# Patient Record
Sex: MALE | Race: WHITE | NOT HISPANIC OR LATINO
[De-identification: names, ages, dates, MRNs, and addresses within clinical notes are randomized per-mention and may not be internally consistent; named-entity substitution may affect disease eponyms.]

---

## 2024-08-28 ENCOUNTER — TRANSCRIPTION ENCOUNTER (OUTPATIENT)
Age: 59
End: 2024-08-28

## 2024-08-28 ENCOUNTER — INPATIENT (INPATIENT)
Facility: HOSPITAL | Age: 59
LOS: 12 days | Discharge: HOME HEALTH | End: 2024-09-10
Attending: ORAL & MAXILLOFACIAL SURGERY | Admitting: ORAL & MAXILLOFACIAL SURGERY
Payer: COMMERCIAL

## 2024-08-28 VITALS
WEIGHT: 100.97 LBS | OXYGEN SATURATION: 98 % | HEIGHT: 64 IN | SYSTOLIC BLOOD PRESSURE: 198 MMHG | DIASTOLIC BLOOD PRESSURE: 78 MMHG | HEART RATE: 62 BPM | RESPIRATION RATE: 17 BRPM

## 2024-08-28 DIAGNOSIS — K04.7 PERIAPICAL ABSCESS WITHOUT SINUS: ICD-10-CM

## 2024-08-28 LAB
ALBUMIN SERPL ELPH-MCNC: 4.2 G/DL — SIGNIFICANT CHANGE UP (ref 3.3–5)
ALP SERPL-CCNC: 95 U/L — SIGNIFICANT CHANGE UP (ref 40–120)
ALT FLD-CCNC: 495 U/L — HIGH (ref 4–41)
ANION GAP SERPL CALC-SCNC: 16 MMOL/L — HIGH (ref 7–14)
APTT BLD: 29.9 SEC — SIGNIFICANT CHANGE UP (ref 24.5–35.6)
AST SERPL-CCNC: 160 U/L — HIGH (ref 4–40)
BASOPHILS # BLD AUTO: 0.09 K/UL — SIGNIFICANT CHANGE UP (ref 0–0.2)
BASOPHILS NFR BLD AUTO: 0.7 % — SIGNIFICANT CHANGE UP (ref 0–2)
BILIRUB SERPL-MCNC: 0.5 MG/DL — SIGNIFICANT CHANGE UP (ref 0.2–1.2)
BUN SERPL-MCNC: 12 MG/DL — SIGNIFICANT CHANGE UP (ref 7–23)
CALCIUM SERPL-MCNC: 10.7 MG/DL — HIGH (ref 8.4–10.5)
CHLORIDE SERPL-SCNC: 103 MMOL/L — SIGNIFICANT CHANGE UP (ref 98–107)
CO2 SERPL-SCNC: 20 MMOL/L — LOW (ref 22–31)
CREAT SERPL-MCNC: 0.94 MG/DL — SIGNIFICANT CHANGE UP (ref 0.5–1.3)
EGFR: 92 ML/MIN/1.73M2 — SIGNIFICANT CHANGE UP
EOSINOPHIL # BLD AUTO: 0.49 K/UL — SIGNIFICANT CHANGE UP (ref 0–0.5)
EOSINOPHIL NFR BLD AUTO: 3.8 % — SIGNIFICANT CHANGE UP (ref 0–6)
GLUCOSE SERPL-MCNC: 105 MG/DL — HIGH (ref 70–99)
HCT VFR BLD CALC: 29.6 % — LOW (ref 39–50)
HGB BLD-MCNC: 9.9 G/DL — LOW (ref 13–17)
IANC: 9.7 K/UL — HIGH (ref 1.8–7.4)
IMM GRANULOCYTES NFR BLD AUTO: 0.4 % — SIGNIFICANT CHANGE UP (ref 0–0.9)
INR BLD: 1.16 RATIO — SIGNIFICANT CHANGE UP (ref 0.85–1.18)
LYMPHOCYTES # BLD AUTO: 1.52 K/UL — SIGNIFICANT CHANGE UP (ref 1–3.3)
LYMPHOCYTES # BLD AUTO: 11.9 % — LOW (ref 13–44)
MCHC RBC-ENTMCNC: 33.4 GM/DL — SIGNIFICANT CHANGE UP (ref 32–36)
MCHC RBC-ENTMCNC: 37.8 PG — HIGH (ref 27–34)
MCV RBC AUTO: 113 FL — HIGH (ref 80–100)
MONOCYTES # BLD AUTO: 0.95 K/UL — HIGH (ref 0–0.9)
MONOCYTES NFR BLD AUTO: 7.4 % — SIGNIFICANT CHANGE UP (ref 2–14)
NEUTROPHILS # BLD AUTO: 9.7 K/UL — HIGH (ref 1.8–7.4)
NEUTROPHILS NFR BLD AUTO: 75.8 % — SIGNIFICANT CHANGE UP (ref 43–77)
NRBC # BLD: 0 /100 WBCS — SIGNIFICANT CHANGE UP (ref 0–0)
NRBC # FLD: 0 K/UL — SIGNIFICANT CHANGE UP (ref 0–0)
PLATELET # BLD AUTO: 426 K/UL — HIGH (ref 150–400)
POTASSIUM SERPL-MCNC: 3.4 MMOL/L — LOW (ref 3.5–5.3)
POTASSIUM SERPL-SCNC: 3.4 MMOL/L — LOW (ref 3.5–5.3)
PROT SERPL-MCNC: 7.6 G/DL — SIGNIFICANT CHANGE UP (ref 6–8.3)
PROTHROM AB SERPL-ACNC: 12.9 SEC — SIGNIFICANT CHANGE UP (ref 9.5–13)
RBC # BLD: 2.62 M/UL — LOW (ref 4.2–5.8)
RBC # FLD: 13.4 % — SIGNIFICANT CHANGE UP (ref 10.3–14.5)
SODIUM SERPL-SCNC: 139 MMOL/L — SIGNIFICANT CHANGE UP (ref 135–145)
TROPONIN T, HIGH SENSITIVITY RESULT: 16 NG/L — SIGNIFICANT CHANGE UP
WBC # BLD: 12.8 K/UL — HIGH (ref 3.8–10.5)
WBC # FLD AUTO: 12.8 K/UL — HIGH (ref 3.8–10.5)

## 2024-08-28 PROCEDURE — 99285 EMERGENCY DEPT VISIT HI MDM: CPT

## 2024-08-28 PROCEDURE — 71045 X-RAY EXAM CHEST 1 VIEW: CPT | Mod: 26

## 2024-08-28 RX ORDER — CITALOPRAM HYDROBROMIDE 40 MG
30 TABLET ORAL
Refills: 0 | Status: DISCONTINUED | OUTPATIENT
Start: 2024-08-28 | End: 2024-09-10

## 2024-08-28 RX ORDER — LAMOTRIGINE 100 MG/1
100 TABLET, EXTENDED RELEASE ORAL AT BEDTIME
Refills: 0 | Status: DISCONTINUED | OUTPATIENT
Start: 2024-08-28 | End: 2024-09-10

## 2024-08-28 RX ORDER — OXYCODONE HYDROCHLORIDE 5 MG/1
10 TABLET ORAL EVERY 4 HOURS
Refills: 0 | Status: DISCONTINUED | OUTPATIENT
Start: 2024-08-28 | End: 2024-08-31

## 2024-08-28 RX ORDER — LITHIUM CARBONATE 150 MG/1
600 CAPSULE ORAL AT BEDTIME
Refills: 0 | Status: DISCONTINUED | OUTPATIENT
Start: 2024-08-28 | End: 2024-09-10

## 2024-08-28 RX ORDER — OXYCODONE HYDROCHLORIDE 5 MG/1
5 TABLET ORAL EVERY 4 HOURS
Refills: 0 | Status: DISCONTINUED | OUTPATIENT
Start: 2024-08-28 | End: 2024-08-31

## 2024-08-28 RX ORDER — ACETAMINOPHEN 325 MG/1
650 TABLET ORAL EVERY 6 HOURS
Refills: 0 | Status: DISCONTINUED | OUTPATIENT
Start: 2024-08-28 | End: 2024-08-29

## 2024-08-28 RX ORDER — AMPICILLIN SODIUM AND SULBACTAM SODIUM 1; .5 G/1; G/1
INJECTION, POWDER, FOR SOLUTION INTRAMUSCULAR; INTRAVENOUS
Refills: 0 | Status: DISCONTINUED | OUTPATIENT
Start: 2024-08-28 | End: 2024-09-01

## 2024-08-28 RX ORDER — AMPICILLIN SODIUM AND SULBACTAM SODIUM 1; .5 G/1; G/1
3 INJECTION, POWDER, FOR SOLUTION INTRAMUSCULAR; INTRAVENOUS ONCE
Refills: 0 | Status: COMPLETED | OUTPATIENT
Start: 2024-08-28 | End: 2024-08-28

## 2024-08-28 RX ORDER — CHLORHEXIDINE GLUCONATE 40 MG/ML
15 SOLUTION TOPICAL
Refills: 0 | Status: DISCONTINUED | OUTPATIENT
Start: 2024-08-28 | End: 2024-08-29

## 2024-08-28 RX ORDER — METOPROLOL TARTRATE 100 MG/1
25 TABLET ORAL
Refills: 0 | Status: DISCONTINUED | OUTPATIENT
Start: 2024-08-28 | End: 2024-08-31

## 2024-08-28 RX ORDER — METHYLPHENIDATE HYDROCHLORIDE 72 MG/1
72 TABLET, EXTENDED RELEASE ORAL EVERY MORNING
Refills: 0 | Status: DISCONTINUED | OUTPATIENT
Start: 2024-08-28 | End: 2024-09-04

## 2024-08-28 RX ORDER — AMPICILLIN SODIUM AND SULBACTAM SODIUM 1; .5 G/1; G/1
3 INJECTION, POWDER, FOR SOLUTION INTRAMUSCULAR; INTRAVENOUS EVERY 6 HOURS
Refills: 0 | Status: DISCONTINUED | OUTPATIENT
Start: 2024-08-28 | End: 2024-09-01

## 2024-08-28 RX ADMIN — AMPICILLIN SODIUM AND SULBACTAM SODIUM 200 GRAM(S): 1; .5 INJECTION, POWDER, FOR SOLUTION INTRAMUSCULAR; INTRAVENOUS at 18:15

## 2024-08-28 RX ADMIN — CHLORHEXIDINE GLUCONATE 15 MILLILITER(S): 40 SOLUTION TOPICAL at 19:04

## 2024-08-28 RX ADMIN — AMPICILLIN SODIUM AND SULBACTAM SODIUM 200 GRAM(S): 1; .5 INJECTION, POWDER, FOR SOLUTION INTRAMUSCULAR; INTRAVENOUS at 23:11

## 2024-08-28 RX ADMIN — Medication 100 MILLIGRAM(S): at 23:02

## 2024-08-28 RX ADMIN — Medication 85 MILLILITER(S): at 19:04

## 2024-08-28 RX ADMIN — ACETAMINOPHEN 650 MILLIGRAM(S): 325 TABLET ORAL at 18:30

## 2024-08-28 RX ADMIN — AMPICILLIN SODIUM AND SULBACTAM SODIUM 200 GRAM(S): 1; .5 INJECTION, POWDER, FOR SOLUTION INTRAMUSCULAR; INTRAVENOUS at 14:38

## 2024-08-28 RX ADMIN — ACETAMINOPHEN 650 MILLIGRAM(S): 325 TABLET ORAL at 17:32

## 2024-08-28 RX ADMIN — LAMOTRIGINE 100 MILLIGRAM(S): 100 TABLET, EXTENDED RELEASE ORAL at 23:02

## 2024-08-28 RX ADMIN — LITHIUM CARBONATE 600 MILLIGRAM(S): 150 CAPSULE ORAL at 23:02

## 2024-08-28 NOTE — ED ADULT NURSE NOTE - CHIEF COMPLAINT QUOTE
pt from [facility] with abscess under the tongue. had drain placed last week. c/o swelling to tongue. pt able to tolerate own secretions, no stridor/wheezing noted.  arrives with #20 s/l to L forearm. pt refusing temp orally.

## 2024-08-28 NOTE — ED ADULT TRIAGE NOTE - NS ED NURSE BANDS TYPE
H&P is consistent. No new changes.     Plan to go to OR today for LEFT URS with LL>     I reviewed risks and benefits of surgery. Patient understands known risks such as bleeding, infection, and damage to tissue or surrounding structures. They are still willing to proceed.       Gary Ghosh MD, PhD   Department of Urology   [facility]     Allergy;

## 2024-08-28 NOTE — ED ADULT NURSE NOTE - OBJECTIVE STATEMENT
pt transfer from other hospital pt noted to have penrose drain under neath tongue pt airway clear zero obvious SOB md méndez continues

## 2024-08-28 NOTE — ED ADULT TRIAGE NOTE - CHIEF COMPLAINT QUOTE
pt from [facility] with abscess under the tongue. had drain placed last week. c/o swelling to tongue. pt able to tolerate own secretions, no stridor/wheezing noted.  arrives with #20 s/l to L forearm. pt refusing temp orally.

## 2024-08-28 NOTE — PATIENT PROFILE ADULT - FALL HARM RISK - HARM RISK INTERVENTIONS
Assistance with ambulation/Communicate Risk of Fall with Harm to all staff/Reinforce activity limits and safety measures with patient and family/Reorient to person, place and time as needed/Review medications for side effects contributing to fall risk/Sit up slowly, dangle for a short time, stand at bedside before walking/Tailored Fall Risk Interventions/Toileting schedule using arm’s reach rule for commode and bathroom/Use of alarms - bed, chair and/or voice tab/Visual Cue: Yellow wristband and red socks/Bed in lowest position, wheels locked, appropriate side rails in place/Call bell, personal items and telephone in reach/Instruct patient to call for assistance before getting out of bed or chair/Non-slip footwear when patient is out of bed/[place] to call system/Physically safe environment - no spills, clutter or unnecessary equipment/Purposeful Proactive Rounding/Room/bathroom lighting operational, light cord in reach

## 2024-08-28 NOTE — PATIENT PROFILE ADULT - FUNCTIONAL ASSESSMENT - BASIC MOBILITY 4.
Dr. Katy Reeder called patient this am and advised him to return to the ED for further evaluation. 4 = No assist / stand by assistance

## 2024-08-28 NOTE — ED PROVIDER NOTE - PROGRESS NOTE DETAILS
Fellow MD Declan Escobar: [facility] paged. Fellow MD Declan Escobar: OMFS at patient bedside.

## 2024-08-28 NOTE — ED PROVIDER NOTE - ATTENDING CONTRIBUTION TO CARE
Keshia SAMAYOA: Patient is a 59-year-old male with a history of ADHD, questionable cognitive dysfunction transferred from [facility] for OMFS evaluation.  Patient was transferred from [facility] on 8/25 for an abscess on the floor of his mouth after having implant surgery.  During that evaluation, patient was subsequently placed in the CDU and OMFS performed an I&D and placed a drain in his mouth.  He was given Unasyn and discharged on Augmentin.  Patient states he went back to [facility] yesterday for severe pain.  He is transferred today from [facility] for OMFS reevaluation.  Per documentation sent with patient, patient was called back for positive blood culture.   per paperwork, patient has been taking his amoxicillin.  He was given vancomycin and Zosyn.  Patient did have a CT scan of soft tissue done.  CT showed 2 dental implants at the level of the mandibular central incisors.  Periapical lucency surrounding the right sided dental implant.  Surgical drain adjacent to the inner aspect of the mandible at the level of the dental implant on the right.  Multilocular rim-enhancing collection (abscess) in the right sublingual space which is inseparable from the, and extends to the level of the right lingual tonsil measuring at least up to 6.0 x 2.6 x 2.6 cm. Patient denies any fevers or chills.  He denies any shortness of breath.  He states that he developed chest pain last night but did not tell chronic may about this.  Patient denies radiation of this chest pain.  He states it could be anxiety.  He denies any nausea or vomiting.        VS noted  Gen. no acute distress, Non toxic   HEENT: EOMI, mmm, Poor dentition, drain in floor of mouth, airway intact.,  no stridor, airway intact  Lungs: CTAB/L no C/ W /R   CVS: RRR    chest wall: TTP to left chest wall  Abd; Soft non tender, non distended   Ext: no edema  Skin: no rash  Neuro AAOx3 non focal clear speech  a/p:  right sublingual space abscess–patient has a drain that was placed by OMFS in place.  He denies any fevers or chills.  He was treated with vancomycin and Zosyn prior to transfer.  Patient already had CT imaging, visible in PACS.  As patient is complaining of chest pain, will repeat including troponin and check EKG.  Patient was called back for a positive blood culture.  He will need to be admitted.  - Kehsia SAMAYOA

## 2024-08-28 NOTE — ED PROVIDER NOTE - CLINICAL SUMMARY MEDICAL DECISION MAKING FREE TEXT BOX
Fellow MD Declan Escobar: 59-year-old male with past medical history of HTN, HLD, with recent tooth implant complicated by abscess status post I&D 2 days ago by OMFS team here and presenting as a transfer from [facility] for OMFS evaluation.  As per patient, he states that he has been experiencing worsening pain to the sublingual space with trismus.  Patient also reports left-sided chest wall pain since last night described as a poking sensation.  The pain is not severe he denies any recent trauma.  Pain is nonexertional.  Troponin from 2045 at [facility] last night was 17.  Patient denies any fever, chills, difficulty breathing, difficulty swallowing, nausea, vomiting, diarrhea, abdominal pain, dysuria, hematuria, and any additional complaints at this time.  No recent travel or sick contacts.    On arrival to the ED, the patient's airway is patent.  He is hypertensive, nontachycardic, afebrile, and satting 98% on room air.  Patient has a sublingual drain in place with no active drainage.  There is tenderness to palpation and trismus noted.  CT report from [facility] shows "2 dental implants at the level of the mandibular central incisors.  Periapical lucency surrounding the right sided dental implant.  Surgical drain adjacent to the inner aspect of the mandible at the level of the dental implant on the right.  Multilocular rim-enhancing collection (abscess) in the right sublingual space which is inseparable from the tongue and extends to the level of the right lingual tonsil measuring at least up to 6 x 2.6 x 2.6 cm.  OMFS consulted in the ED who came to the bedside.  As per transfer note, patient was also found to have a positive blood culture and was treated with 1 g vancomycin and 4.5 g of Zosyn prior to transfer.

## 2024-08-28 NOTE — ED PROVIDER NOTE - OBJECTIVE STATEMENT
Keshia SAMAYOA: Patient is a 59-year-old male with a history of ADHD, questionable cognitive dysfunction transferred from [facility] for OMFS evaluation.  Patient was transferred from [facility] on 8/25 for an abscess on the floor of his mouth after having implant surgery.  During that evaluation, patient was subsequently placed in the CDU and OMFS performed an I&D and placed a drain in his mouth.  He was given Unasyn and discharged on Augmentin.  Patient states he went back to [facility] yesterday for severe pain.  He is transferred today from [facility] for OMFS reevaluation.  Patient denies any fevers or chills.  He denies any shortness of breath.  He states that he developed chest pain last night but did not tell chronic may about this.  Patient denies radiation of this chest pain.  He states it could be anxiety.  He denies any nausea or vomiting. Keshia SAMAYOA: Patient is a 59-year-old male with a history of ADHD, questionable cognitive dysfunction transferred from [facility] for OMFS evaluation.  Patient was transferred from [facility] on 8/25 for an abscess on the floor of his mouth after having implant surgery.  During that evaluation, patient was subsequently placed in the CDU and OMFS performed an I&D and placed a drain in his mouth.  He was given Unasyn and discharged on Augmentin.  Patient states he went back to [facility] yesterday for severe pain.  He is transferred today from [facility] for OMFS reevaluation.  Per documentation sent with patient, patient was called back for positive blood culture.   per paperwork, patient has been taking his amoxicillin.  He was given vancomycin and Zosyn.  Patient did have a CT scan of soft tissue done.  CT showed 2 dental implants at the level of the mandibular central incisors.  Periapical lucency surrounding the right sided dental implant.  Surgical drain adjacent to the inner aspect of the mandible at the level of the dental implant on the right.  Multilocular rim-enhancing collection (abscess) in the right sublingual space which is inseparable from the, and extends to the level of the right lingual tonsil measuring at least up to 6.0 x 2.6 x 2.6 cm. Patient denies any fevers or chills.  He denies any shortness of breath.  He states that he developed chest pain last night but did not tell chronic may about this.  Patient denies radiation of this chest pain.  He states it could be anxiety.  He denies any nausea or vomiting.

## 2024-08-28 NOTE — ED ADULT NURSE REASSESSMENT NOTE - NS ED NURSE REASSESS COMMENT FT1
report given to shila carrington.     patient aaox4. ambulatory. patient is well appearing. penrose drain in place in mouth. denies pain or discomfort. respirations even and unlabored on room air. tolerating po full liquids at bedside. lr at 85ml/hr. iv site intact. awaiting transport.

## 2024-08-28 NOTE — ED ADULT NURSE REASSESSMENT NOTE - NS ED NURSE REASSESS COMMENT FT1
PT is resting in stretcher, easily arousable to verbal stimuli. no apparent distress noted.  pt brought over to CDU. pt complains of right knuckle redness with out pain. RN Ab and MD Ewing made aware. pt plan of care on going.

## 2024-08-28 NOTE — H&P ADULT - ASSESSMENT
59M with HTN and unspecified cognitive disorder, with worsening sublingual abscess, for I&D in OR tomorrow .    PLAN:   - Admit under care of [facility] Dr. Sepulveda   - IV Unasyn   - multimodal pain management   - NPO midnight   - Labs: (pt,ptt,type&screen)  - Diet: Full liquid diet  59M with HTN, HLD, unspecified cognitive disorder, with worsening sublingual abscess, for I&D in OR tomorrow .    PLAN:   - Admit under care of [facility] Dr. Derick Best   - multimodal pain management   - NPO midnight   - Labs: (pt,ptt,type&screen)  - Diet: Full liquid diet

## 2024-08-28 NOTE — ED PROVIDER NOTE - ENMT, MLM
Airway patent, Nasal mucosa clear. Mouth with normal mucosa. Throat has no vesicles, no oropharyngeal exudates and uvula is midline. Poor dentition, drain in floor of mouth, airway intact.

## 2024-08-28 NOTE — H&P ADULT - NSHPREVIEWOFSYSTEMS_GEN_ALL_CORE
CONSTITUTIONAL: Well groomed, no apparent distress    EYES: PERRL and symmetric, EOMI, No conjunctival or scleral injection, non-icteric    ENMT:   ears: canals clear, no signs of hearing discrepancies  nose: nares clear, no rhinorrhea noted  intact inferior boarder of mandible     IOE: FOZIA WNL, uvula centered, FOM elevated, indurated sublingual swelling, no purulence discharge from penrose, intact tongue movements.     NECK: Supple, symmetric and without tracheal deviation     RESP: No respiratory distress, no use of accessory muscles; CTA b/l    CV: RRR, no JVD; no peripheral edema    GI: Soft, NT, ND, no rebound, no guarding; no palpable masses; no hepatosplenomegaly    LYMPH: No cervical LAD or tenderness; no axillary LAD or tenderness    MSK: Normal gait; No digital clubbing or cyanosis; examination of the head/neck without misalignment,            Normal ROM without pain, no spinal tenderness, normal muscle strength/tone    SKIN: No rashes or ulcers noted; no subcutaneous nodules or induration palpable    NEURO: CN II-XII intact; normal reflexes in upper, sensation intact in upper and lower extremities b/l to light touch     PSYCH: Appropriate insight/judgment; A+O x 3, mood and affect appropriate, recent/remote memory slightly altered

## 2024-08-28 NOTE — H&P ADULT - NSHPPHYSICALEXAM_GEN_ALL_CORE
General: AAOx3, NAD  H: Normocephalic             Maxillofacial: No step deformities notes, no facial asymmetry, intact inferior boarder of mandible              Intraoral:  FOZIA WNL, uvula centered, FOM elevated, indurated sublingual swelling, no purulence discharge from penrose, intact tongue movements.   E: PERRLA, EOMI  E: Hearing grossly intact, no otorrhea  N: No septal hematoma, no crepitus, no epistaxis  T: Trachea midline  Neuro: No CN V1-3 or VII neuro deficits.

## 2024-08-28 NOTE — H&P ADULT - HISTORY OF PRESENT ILLNESS
59M with HTN and unspecified cognitive disorder. Transferred from [facility] with worsening sublingual swelling.   Pt seen at [facility] on Aug 26h for the same complaint, bedside I&D of  sublingual space done with IV Unasyn. Pt discharged the following day with PO Augmentin "pt denies taking his PO abx after discharged". Today pt reports that he didn't eat nor drink much because of the pain and difficulty swallowing. pt reports improving in his symptoms after the IV abx received at [facility]. reports mild chest pain. Denies SOB/F/N/C/V,  59M with HTN, HLD, unspecified cognitive disorder. Transferred from [facility] with worsening sublingual swelling.   Pt seen at [facility] on Aug 26h for the same complaint, bedside I&D of  sublingual space done with IV Unasyn. Pt discharged the following day with PO Augmentin "pt denies taking his PO abx after discharged". Today pt reports that he didn't eat nor drink much because of the pain and difficulty swallowing. pt reports improving in his symptoms after the IV abx received at [facility]. reports mild chest pain. Denies SOB/F/N/C/V,

## 2024-08-28 NOTE — H&P ADULT - NSHPLABSRESULTS_GEN_ALL_CORE
CT Maxillofacial (Aug 28th, 2024)  Technique: Axial, sagittal, and coronal noncontrast CT scans of the facial  bones.    Findings:  The examination shows the frontal sinuses to be well developed and normally  aerated. The anterior and posterior sinus tables are intact. The ethmoid  sinuses are well developed. There is mild mucosal thickening and there is no  bone abnormality. The lamina papyracea are intact. The sphenoid sinuses are  well developed and normal.    The maxillary sinuses are symmetrically developed. There is circumferential  mucosal thickening of the left maxillary sinus. There is partial  opacification of the left mastoid tip.    No nasal fossa abnormality is present. No fracture of the facial bones is  seen. Is deviation of the nasal septum with convexity to the right.    The visualized intracranial and orbital soft tissues are grossly normal as  are the visualized soft tissues under the skull base.    IMPRESSION: No facial bone fracture is seen.  ---------------------------------------  Labs:                      9.9    12.80 )-----------( [id number]      ( 28 Aug 2024 10:40 )             29.6   08-28    139  |  103  |  12  ----------------------------<  105<H>  3.4<L>   |  20<L>  |  0.94    Ca    10.7<H>      28 Aug 2024 10:40    TPro  7.6  /  Alb  4.2  /  TBili  0.5  /  DBili  x   /  AST  160<H>  /  ALT  495<H>  /  AlkPhos  95  08-28

## 2024-08-29 ENCOUNTER — APPOINTMENT (OUTPATIENT)
Age: 59
End: 2024-08-29
Payer: COMMERCIAL

## 2024-08-29 LAB
ALBUMIN SERPL ELPH-MCNC: 3.5 G/DL — SIGNIFICANT CHANGE UP (ref 3.3–5)
ALP SERPL-CCNC: 70 U/L — SIGNIFICANT CHANGE UP (ref 40–120)
ALT FLD-CCNC: 322 U/L — HIGH (ref 4–41)
ANION GAP SERPL CALC-SCNC: 13 MMOL/L — SIGNIFICANT CHANGE UP (ref 7–14)
ANION GAP SERPL CALC-SCNC: 13 MMOL/L — SIGNIFICANT CHANGE UP (ref 7–14)
AST SERPL-CCNC: 81 U/L — HIGH (ref 4–40)
BILIRUB SERPL-MCNC: 0.4 MG/DL — SIGNIFICANT CHANGE UP (ref 0.2–1.2)
BLOOD GAS ARTERIAL COMPREHENSIVE RESULT: SIGNIFICANT CHANGE UP
BUN SERPL-MCNC: 8 MG/DL — SIGNIFICANT CHANGE UP (ref 7–23)
BUN SERPL-MCNC: 9 MG/DL — SIGNIFICANT CHANGE UP (ref 7–23)
CALCIUM SERPL-MCNC: 9.7 MG/DL — SIGNIFICANT CHANGE UP (ref 8.4–10.5)
CALCIUM SERPL-MCNC: 9.8 MG/DL — SIGNIFICANT CHANGE UP (ref 8.4–10.5)
CHLORIDE SERPL-SCNC: 105 MMOL/L — SIGNIFICANT CHANGE UP (ref 98–107)
CHLORIDE SERPL-SCNC: 105 MMOL/L — SIGNIFICANT CHANGE UP (ref 98–107)
CO2 SERPL-SCNC: 24 MMOL/L — SIGNIFICANT CHANGE UP (ref 22–31)
CO2 SERPL-SCNC: 24 MMOL/L — SIGNIFICANT CHANGE UP (ref 22–31)
CREAT SERPL-MCNC: 0.71 MG/DL — SIGNIFICANT CHANGE UP (ref 0.5–1.3)
CREAT SERPL-MCNC: 0.9 MG/DL — SIGNIFICANT CHANGE UP (ref 0.5–1.3)
EGFR: 104 ML/MIN/1.73M2 — SIGNIFICANT CHANGE UP
EGFR: 97 ML/MIN/1.73M2 — SIGNIFICANT CHANGE UP
GLUCOSE SERPL-MCNC: 200 MG/DL — HIGH (ref 70–99)
GLUCOSE SERPL-MCNC: 94 MG/DL — SIGNIFICANT CHANGE UP (ref 70–99)
HCT VFR BLD CALC: 25.8 % — LOW (ref 39–50)
HCT VFR BLD CALC: 28.6 % — LOW (ref 39–50)
HGB BLD-MCNC: 8.7 G/DL — LOW (ref 13–17)
HGB BLD-MCNC: 9.8 G/DL — LOW (ref 13–17)
INR BLD: 1.15 RATIO — SIGNIFICANT CHANGE UP (ref 0.85–1.18)
MAGNESIUM SERPL-MCNC: 1.8 MG/DL — SIGNIFICANT CHANGE UP (ref 1.6–2.6)
MAGNESIUM SERPL-MCNC: 1.9 MG/DL — SIGNIFICANT CHANGE UP (ref 1.6–2.6)
MCHC RBC-ENTMCNC: 33.7 GM/DL — SIGNIFICANT CHANGE UP (ref 32–36)
MCHC RBC-ENTMCNC: 34.3 GM/DL — SIGNIFICANT CHANGE UP (ref 32–36)
MCHC RBC-ENTMCNC: 37.7 PG — HIGH (ref 27–34)
MCHC RBC-ENTMCNC: 38.1 PG — HIGH (ref 27–34)
MCV RBC AUTO: 111.3 FL — HIGH (ref 80–100)
MCV RBC AUTO: 111.7 FL — HIGH (ref 80–100)
NRBC # BLD: 0 /100 WBCS — SIGNIFICANT CHANGE UP (ref 0–0)
NRBC # BLD: 0 /100 WBCS — SIGNIFICANT CHANGE UP (ref 0–0)
NRBC # FLD: 0 K/UL — SIGNIFICANT CHANGE UP (ref 0–0)
NRBC # FLD: 0 K/UL — SIGNIFICANT CHANGE UP (ref 0–0)
PHOSPHATE SERPL-MCNC: 2.5 MG/DL — SIGNIFICANT CHANGE UP (ref 2.5–4.5)
PHOSPHATE SERPL-MCNC: 3.2 MG/DL — SIGNIFICANT CHANGE UP (ref 2.5–4.5)
PLATELET # BLD AUTO: 400 K/UL — SIGNIFICANT CHANGE UP (ref 150–400)
PLATELET # BLD AUTO: 419 K/UL — HIGH (ref 150–400)
POTASSIUM SERPL-MCNC: 3.5 MMOL/L — SIGNIFICANT CHANGE UP (ref 3.5–5.3)
POTASSIUM SERPL-MCNC: 3.5 MMOL/L — SIGNIFICANT CHANGE UP (ref 3.5–5.3)
POTASSIUM SERPL-SCNC: 3.5 MMOL/L — SIGNIFICANT CHANGE UP (ref 3.5–5.3)
POTASSIUM SERPL-SCNC: 3.5 MMOL/L — SIGNIFICANT CHANGE UP (ref 3.5–5.3)
PROT SERPL-MCNC: 6.1 G/DL — SIGNIFICANT CHANGE UP (ref 6–8.3)
PROTHROM AB SERPL-ACNC: 12.9 SEC — SIGNIFICANT CHANGE UP (ref 9.5–13)
RBC # BLD: 2.31 M/UL — LOW (ref 4.2–5.8)
RBC # BLD: 2.57 M/UL — LOW (ref 4.2–5.8)
RBC # FLD: 12.4 % — SIGNIFICANT CHANGE UP (ref 10.3–14.5)
RBC # FLD: 13 % — SIGNIFICANT CHANGE UP (ref 10.3–14.5)
SODIUM SERPL-SCNC: 142 MMOL/L — SIGNIFICANT CHANGE UP (ref 135–145)
SODIUM SERPL-SCNC: 142 MMOL/L — SIGNIFICANT CHANGE UP (ref 135–145)
WBC # BLD: 10.56 K/UL — HIGH (ref 3.8–10.5)
WBC # BLD: 15.55 K/UL — HIGH (ref 3.8–10.5)
WBC # FLD AUTO: 10.56 K/UL — HIGH (ref 3.8–10.5)
WBC # FLD AUTO: 15.55 K/UL — HIGH (ref 3.8–10.5)

## 2024-08-29 PROCEDURE — 71045 X-RAY EXAM CHEST 1 VIEW: CPT | Mod: 26

## 2024-08-29 PROCEDURE — 71045 X-RAY EXAM CHEST 1 VIEW: CPT | Mod: 26,77

## 2024-08-29 PROCEDURE — 99291 CRITICAL CARE FIRST HOUR: CPT | Mod: GC

## 2024-08-29 DEVICE — AVITENE: Type: IMPLANTABLE DEVICE | Status: FUNCTIONAL

## 2024-08-29 RX ORDER — DEXMEDETOMIDINE HYDROCHLORIDE IN 0.9% SODIUM CHLORIDE 4 UG/ML
0.2 INJECTION INTRAVENOUS
Qty: 200 | Refills: 0 | Status: DISCONTINUED | OUTPATIENT
Start: 2024-08-29 | End: 2024-08-29

## 2024-08-29 RX ORDER — DEXTROSE, SODIUM ACETATE, POTASSIUM CHLORIDE, POTASSIUM PHOSPHATE, AND SODIUM CHLORIDE 5; .15; .13; .28; .091 G/100ML; G/100ML; G/100ML; G/100ML; G/100ML
1000 INJECTION, SOLUTION INTRAVENOUS
Refills: 0 | Status: DISCONTINUED | OUTPATIENT
Start: 2024-08-29 | End: 2024-09-01

## 2024-08-29 RX ORDER — CHLORHEXIDINE GLUCONATE 40 MG/ML
15 SOLUTION TOPICAL EVERY 12 HOURS
Refills: 0 | Status: DISCONTINUED | OUTPATIENT
Start: 2024-08-29 | End: 2024-09-03

## 2024-08-29 RX ORDER — PROPOFOL 10 MG/ML
40 INJECTION, EMULSION INTRAVENOUS
Qty: 1000 | Refills: 0 | Status: DISCONTINUED | OUTPATIENT
Start: 2024-08-29 | End: 2024-08-30

## 2024-08-29 RX ORDER — POTASSIUM CHLORIDE 10 MEQ
10 TABLET, EXT RELEASE, PARTICLES/CRYSTALS ORAL
Refills: 0 | Status: COMPLETED | OUTPATIENT
Start: 2024-08-29 | End: 2024-08-29

## 2024-08-29 RX ORDER — DEXAMETHASONE 0.75 MG
4 TABLET ORAL EVERY 8 HOURS
Refills: 0 | Status: DISCONTINUED | OUTPATIENT
Start: 2024-08-29 | End: 2024-08-29

## 2024-08-29 RX ORDER — DEXMEDETOMIDINE HYDROCHLORIDE IN 0.9% SODIUM CHLORIDE 4 UG/ML
0.2 INJECTION INTRAVENOUS
Qty: 400 | Refills: 0 | Status: DISCONTINUED | OUTPATIENT
Start: 2024-08-29 | End: 2024-08-30

## 2024-08-29 RX ORDER — HYDROMORPHONE HYDROCHLORIDE 2 MG/1
1 TABLET ORAL
Refills: 0 | Status: DISCONTINUED | OUTPATIENT
Start: 2024-08-29 | End: 2024-08-29

## 2024-08-29 RX ORDER — HYDROMORPHONE HYDROCHLORIDE 2 MG/1
0.5 TABLET ORAL
Refills: 0 | Status: DISCONTINUED | OUTPATIENT
Start: 2024-08-29 | End: 2024-08-29

## 2024-08-29 RX ORDER — ENOXAPARIN SODIUM 100 MG/ML
40 INJECTION SUBCUTANEOUS EVERY 24 HOURS
Refills: 0 | Status: DISCONTINUED | OUTPATIENT
Start: 2024-08-29 | End: 2024-09-05

## 2024-08-29 RX ORDER — CHLORHEXIDINE GLUCONATE 40 MG/ML
1 SOLUTION TOPICAL DAILY
Refills: 0 | Status: DISCONTINUED | OUTPATIENT
Start: 2024-08-29 | End: 2024-09-10

## 2024-08-29 RX ORDER — ACETAMINOPHEN 325 MG/1
1000 TABLET ORAL EVERY 6 HOURS
Refills: 0 | Status: COMPLETED | OUTPATIENT
Start: 2024-08-29 | End: 2024-08-30

## 2024-08-29 RX ORDER — HYDRALAZINE HCL 50 MG
10 TABLET ORAL ONCE
Refills: 0 | Status: COMPLETED | OUTPATIENT
Start: 2024-08-29 | End: 2024-08-29

## 2024-08-29 RX ADMIN — DEXMEDETOMIDINE HYDROCHLORIDE IN 0.9% SODIUM CHLORIDE 2.29 MICROGRAM(S)/KG/HR: 4 INJECTION INTRAVENOUS at 23:09

## 2024-08-29 RX ADMIN — ACETAMINOPHEN 400 MILLIGRAM(S): 325 TABLET ORAL at 23:16

## 2024-08-29 RX ADMIN — CHLORHEXIDINE GLUCONATE 15 MILLILITER(S): 40 SOLUTION TOPICAL at 17:00

## 2024-08-29 RX ADMIN — AMPICILLIN SODIUM AND SULBACTAM SODIUM 200 GRAM(S): 1; .5 INJECTION, POWDER, FOR SOLUTION INTRAMUSCULAR; INTRAVENOUS at 11:49

## 2024-08-29 RX ADMIN — Medication 10 MILLIGRAM(S): at 11:44

## 2024-08-29 RX ADMIN — DEXMEDETOMIDINE HYDROCHLORIDE IN 0.9% SODIUM CHLORIDE 2.29 MICROGRAM(S)/KG/HR: 4 INJECTION INTRAVENOUS at 14:29

## 2024-08-29 RX ADMIN — CHLORHEXIDINE GLUCONATE 15 MILLILITER(S): 40 SOLUTION TOPICAL at 05:37

## 2024-08-29 RX ADMIN — Medication 100 MILLIEQUIVALENT(S): at 22:48

## 2024-08-29 RX ADMIN — AMPICILLIN SODIUM AND SULBACTAM SODIUM 200 GRAM(S): 1; .5 INJECTION, POWDER, FOR SOLUTION INTRAMUSCULAR; INTRAVENOUS at 05:37

## 2024-08-29 RX ADMIN — DEXMEDETOMIDINE HYDROCHLORIDE IN 0.9% SODIUM CHLORIDE 2.29 MICROGRAM(S)/KG/HR: 4 INJECTION INTRAVENOUS at 19:48

## 2024-08-29 RX ADMIN — ENOXAPARIN SODIUM 40 MILLIGRAM(S): 100 INJECTION SUBCUTANEOUS at 17:00

## 2024-08-29 RX ADMIN — PROPOFOL 11 MICROGRAM(S)/KG/MIN: 10 INJECTION, EMULSION INTRAVENOUS at 14:28

## 2024-08-29 RX ADMIN — Medication 85 MILLILITER(S): at 05:37

## 2024-08-29 RX ADMIN — Medication 100 GRAM(S): at 20:49

## 2024-08-29 RX ADMIN — PROPOFOL 11 MICROGRAM(S)/KG/MIN: 10 INJECTION, EMULSION INTRAVENOUS at 19:48

## 2024-08-29 RX ADMIN — DEXTROSE, SODIUM ACETATE, POTASSIUM CHLORIDE, POTASSIUM PHOSPHATE, AND SODIUM CHLORIDE 100 MILLILITER(S): 5; .15; .13; .28; .091 INJECTION, SOLUTION INTRAVENOUS at 19:47

## 2024-08-29 RX ADMIN — PROPOFOL 11 MICROGRAM(S)/KG/MIN: 10 INJECTION, EMULSION INTRAVENOUS at 10:20

## 2024-08-29 RX ADMIN — Medication 100 MILLIEQUIVALENT(S): at 20:49

## 2024-08-29 RX ADMIN — ACETAMINOPHEN 400 MILLIGRAM(S): 325 TABLET ORAL at 17:00

## 2024-08-29 RX ADMIN — DEXTROSE, SODIUM ACETATE, POTASSIUM CHLORIDE, POTASSIUM PHOSPHATE, AND SODIUM CHLORIDE 100 MILLILITER(S): 5; .15; .13; .28; .091 INJECTION, SOLUTION INTRAVENOUS at 14:29

## 2024-08-29 RX ADMIN — CHLORHEXIDINE GLUCONATE 1 APPLICATION(S): 40 SOLUTION TOPICAL at 11:49

## 2024-08-29 RX ADMIN — AMPICILLIN SODIUM AND SULBACTAM SODIUM 200 GRAM(S): 1; .5 INJECTION, POWDER, FOR SOLUTION INTRAMUSCULAR; INTRAVENOUS at 23:10

## 2024-08-29 RX ADMIN — PROPOFOL 11 MICROGRAM(S)/KG/MIN: 10 INJECTION, EMULSION INTRAVENOUS at 23:09

## 2024-08-29 RX ADMIN — AMPICILLIN SODIUM AND SULBACTAM SODIUM 200 GRAM(S): 1; .5 INJECTION, POWDER, FOR SOLUTION INTRAMUSCULAR; INTRAVENOUS at 17:00

## 2024-08-29 NOTE — CONSULT NOTE ADULT - ATTENDING COMMENTS
Patient s/p I and d of sublingual abscess requiring intubating for critical airway and airway edema. Will keep intubated 24 hours and attempt extubation tomorrow  N mentating, sedated on propofol, dilaudid prn for pain  resp on mechanical ventilation adequate gas exchange, intubated for airway edema  cv adequate perfusion  gi npo, ivf  gu/renal adequate uop, monitor  heme vte ppx  id unasyn  endo no changes    The patient is a critical care patient with life threatening hemodynamic and metabolic instability in SICU.  I have personally interviewed when possible and examined the patient, reviewed data and laboratory tests/x-rays and all pertinent electronic images.  I was physically present for the key portions of the evaluation and management (E/M) service provided.   The SICU team has a constant risk benefit analyzes discussion with the primary team, all consultants, House Staff and PA's on all decisions.  These diagnoses are unrelated to the surgical procedure noted above.  I meet with family if needed to get further history, discuss the case and make care decisions for this patient who might not be able to participate.  Time involved in performance of separately billable procedures was not counted toward my critical care time. There is no overlap.  I spent 55-75 minutes ( 0800Hrs-0915Hrs in AM/ 1600Hrs-1715Hrs in PM, or other time indicated) of critical care time for the diagnoses, assessment, plan and interventions.  This time excludes time spent on separate procedures and teaching.

## 2024-08-29 NOTE — PROGRESS NOTE ADULT - SUBJECTIVE AND OBJECTIVE BOX
59M HD2 HTN, HLD with worsening sublingual swelling. Patient is going today for I&D of sublingual space abscess. Patient has been NPO. Denies SOB/F/N/C/V,       Constitutional: Well nourished, no acute distress.  Neuro: AAOx3, CN II-XII grossly intact  Head: Normocephalic, atraumatic  Eyes: EOMI, PERRL, Direct and consensual pupillary reflex  Ears: hearing grossly intact b/l  Nose: nares patent  Neck: trachea midline, no cervical LAD  Respiratory: symmetry of respiratory movements, adequate depth and quality, no acute  respiratory distress    Extra Oral Exam: FOZIA ~30mm. no trismus, no LAD, inferior border of the mandible fully  palpable.  Intra Oral Exam: uvula midline, no vestibular pathology, no grossly carious teeth, no s/s of  infection, no fernando purulence, drain in place FOM s/e/nt.    CT max face shows collection in the sublingual area

## 2024-08-29 NOTE — PROGRESS NOTE ADULT - ASSESSMENT
59M HD2 HTN, HLD with worsening sublingual swelling.    - Patient going to OR today  - NPO 59M HD2 HTN, HLD with worsening sublingual swelling.    - Patient going to OR today  - NPO    Mason De La Cruz  Oral and Maxillofacial Surgery  [facility] Pager #[phone]  [facility] Pager: [phone]  Available on Teams

## 2024-08-29 NOTE — BRIEF OPERATIVE NOTE - OPERATION/FINDINGS
Incision and drainage of sublingual space abscess. Concern for posterior airway swelling with possible hematoma. Patient left intubated for airway protection.

## 2024-08-29 NOTE — CONSULT NOTE ADULT - ASSESSMENT
59M with HTN, HLD, unspecified cognitive disorder. SP I&D 8/29 2/2 worsening sublingual abscess    Neuro  - Sedated  - Pain control w/ IV tylenol, Dilaudid PRN    Resp  - Intubated 2/2 sublingual edema v hematoma. To stay intubated 24hr    Card  - MAP >65    GI  - NPO      - monitor urine output  - IVF  @100    Heme  - DVT ppx w/ lovenox  - SCD    ID  - Amox/Clav    Disposition  - SICU 59M with HTN, HLD, unspecified cognitive disorder. SP I&D 8/29 2/2 worsening sublingual abscess    Neuro  - Sedated  - Pain control w/ IV tylenol, Dilaudid PRN    Resp  - Intubated 2/2 sublingual edema v hematoma. To stay intubated 24hr    Card  - MAP >65    GI  - NPO      - monitor urine output  - IVF  @100    Heme  - DVT ppx w/ lovenox  - SCD    ID  - Unasyn     Disposition  - SICU

## 2024-08-29 NOTE — CONSULT NOTE ADULT - SUBJECTIVE AND OBJECTIVE BOX
Plastic Surgery Consult Note  (pg [facility]: [id number], [place]: [phone])    HPI:  59M with HTN, HLD, unspecified cognitive disorder. Transferred from [facility] with worsening sublingual swelling.   Pt seen at [facility] on Aug 26h for the same complaint, bedside I&D of  sublingual space done with IV Unasyn. Pt discharged the following day with PO Augmentin "pt denies taking his PO abx after discharged". Today pt reports that he didn't eat nor drink much because of the pain and difficulty swallowing. pt reports improving in his symptoms after the IV abx received at [facility]. reports mild chest pain. Denies SOB/F/N/C/V,  (28 Aug 2024 13:39)    PAST MEDICAL & SURGICAL HISTORY:  Hypertension        Allergies    No Known Allergies    Intolerances      Home Medications:    MEDICATIONS  (STANDING):  acetaminophen   IVPB .. 1000 milliGRAM(s) IV Intermittent every 6 hours  ampicillin/sulbactam  IVPB 3 Gram(s) IV Intermittent every 6 hours  ampicillin/sulbactam  IVPB      atorvastatin 10 milliGRAM(s) Oral with breakfast  chlorhexidine 0.12% Liquid 15 milliLiter(s) Swish and Spit two times a day  chlorhexidine 0.12% Liquid 15 milliLiter(s) Oral Mucosa every 12 hours  chlorhexidine 2% Cloths 1 Application(s) Topical daily  citalopram 30 milliGRAM(s) Oral with breakfast  dextrose 5% + sodium chloride 0.9% with potassium chloride 20 mEq/L 1000 milliLiter(s) (100 mL/Hr) IV Continuous <Continuous>  enoxaparin Injectable 40 milliGRAM(s) SubCutaneous every 24 hours  lactated ringers. 1000 milliLiter(s) (85 mL/Hr) IV Continuous <Continuous>  lamoTRIgine 100 milliGRAM(s) Oral at bedtime  lithium 600 milliGRAM(s) Oral at bedtime  methylphenidate ER (CONCERTA) 72 milliGRAM(s) Oral every morning  metoprolol succinate ER 25 milliGRAM(s) Oral with breakfast  propofol Infusion 40 MICROgram(s)/kG/Min (11 mL/Hr) IV Continuous <Continuous>  QUEtiapine 100 milliGRAM(s) Oral at bedtime      SOCIAL HISTORY:  FAMILY HISTORY:      ___________________________________________  OBJECTIVE:  Vital Signs Last 24 Hrs  T(C): 36.9 (29 Aug 2024 11:30), Max: 37.2 (29 Aug 2024 06:30)  T(F): 98.4 (29 Aug 2024 11:30), Max: 99 (29 Aug 2024 06:30)  HR: 93 (29 Aug 2024 11:30) (60 - 93)  BP: 209/105 (29 Aug 2024 11:30) (146/78 - 214/107)  BP(mean): 136 (29 Aug 2024 11:30) (97 - 136)  RR: 10 (29 Aug 2024 11:30) (10 - 18)  SpO2: 100% (29 Aug 2024 11:30) (96% - 100%)    Parameters below as of 29 Aug 2024 11:30  Patient On (Oxygen Delivery Method): ventilator    CAPILLARY BLOOD GLUCOSE        I&O's Detail    28 Aug 2024 07:01  -  29 Aug 2024 07:00  --------------------------------------------------------  IN:    IV PiggyBack: 200 mL    Lactated Ringers: 765 mL    Oral Fluid: 240 mL  Total IN: 1205 mL    OUT:    Voided (mL): 300 mL  Total OUT: 300 mL    Total NET: 905 mL      29 Aug 2024 07:01  -  29 Aug 2024 12:04  --------------------------------------------------------  IN:    Lactated Ringers: 85 mL  Total IN: 85 mL    OUT:    Indwelling Catheter - Urethral (mL): 575 mL  Total OUT: 575 mL    Total NET: -490 mL        General: Well developed, well nourished, NAD  Neuro: sedated  HEENT: tongue edematous  Respiratory: intubated  CVS: Regular rate and rhythm  Abdomen: Soft, nontender, nondistended  Extremities: No edema, sensation and movement grossly intact  Skin: Warm, dry, appropriate color  ____________________________________________  LABS:  CBC Full  -  ( 29 Aug 2024 06:37 )  WBC Count : 10.56 K/uL  RBC Count : 2.31 M/uL  Hemoglobin : 8.7 g/dL  Hematocrit : 25.8 %  Platelet Count - Automated : 400 K/uL  Mean Cell Volume : 111.7 fL  Mean Cell Hemoglobin : 37.7 pg  Mean Cell Hemoglobin Concentration : 33.7 gm/dL  Auto Neutrophil # : x  Auto Lymphocyte # : x  Auto Monocyte # : x  Auto Eosinophil # : x  Auto Basophil # : x  Auto Neutrophil % : x  Auto Lymphocyte % : x  Auto Monocyte % : x  Auto Eosinophil % : x  Auto Basophil % : x    08-29    142  |  105  |  9   ----------------------------<  94  3.5   |  24  |  0.90    Ca    9.7      29 Aug 2024 06:37  Phos  2.5     08-29  Mg     1.90     08-29    TPro  6.1  /  Alb  3.5  /  TBili  0.4  /  DBili  x   /  AST  81<H>  /  ALT  322<H>  /  AlkPhos  70  08-29    LIVER FUNCTIONS - ( 29 Aug 2024 06:37 )  Alb: 3.5 g/dL / Pro: 6.1 g/dL / ALK PHOS: 70 U/L / ALT: 322 U/L / AST: 81 U/L / GGT: x           PT/INR - ( 29 Aug 2024 06:37 )   PT: 12.9 sec;   INR: 1.15 ratio         PTT - ( 28 Aug 2024 15:15 )  PTT:29.9 sec  Urinalysis Basic - ( 29 Aug 2024 06:37 )    Color: x / Appearance: x / SG: x / pH: x  Gluc: 94 mg/dL / Ketone: x  / Bili: x / Urobili: x   Blood: x / Protein: x / Nitrite: x   Leuk Esterase: x / RBC: x / WBC x   Sq Epi: x / Non Sq Epi: x / Bacteria: x            ____________________________________________  MICRO:  RECENT CULTURES:  Specimen Source: Surgical Swab sublingual fluid drainage from I&D  Date/Time: 08-26 @ 02:05  Culture Results:   Few Streptococcus salivarius/vestibularis group "Susceptibilities not  performed"  Rare Staphylococcus hominis "Susceptibilities not performed"  Few Lactobacillus salivarius "Susceptibilities not performed"<!>  Gram Stain: --  Organism: --    ____________________________________________  RADIOLOGY:

## 2024-08-30 LAB
ANION GAP SERPL CALC-SCNC: 10 MMOL/L — SIGNIFICANT CHANGE UP (ref 7–14)
BLOOD GAS ARTERIAL - LYTES,HGB,ICA,LACT RESULT: SIGNIFICANT CHANGE UP
BUN SERPL-MCNC: 9 MG/DL — SIGNIFICANT CHANGE UP (ref 7–23)
CALCIUM SERPL-MCNC: 8.9 MG/DL — SIGNIFICANT CHANGE UP (ref 8.4–10.5)
CHLORIDE SERPL-SCNC: 110 MMOL/L — HIGH (ref 98–107)
CO2 SERPL-SCNC: 20 MMOL/L — LOW (ref 22–31)
CREAT SERPL-MCNC: 0.9 MG/DL — SIGNIFICANT CHANGE UP (ref 0.5–1.3)
EGFR: 97 ML/MIN/1.73M2 — SIGNIFICANT CHANGE UP
GLUCOSE SERPL-MCNC: 158 MG/DL — HIGH (ref 70–99)
HCT VFR BLD CALC: 21.8 % — LOW (ref 39–50)
HCT VFR BLD CALC: 26.5 % — LOW (ref 39–50)
HGB BLD-MCNC: 7.4 G/DL — LOW (ref 13–17)
HGB BLD-MCNC: 8.7 G/DL — LOW (ref 13–17)
LITHIUM SERPL-MCNC: 0.3 MMOL/L — LOW (ref 0.6–1.2)
MAGNESIUM SERPL-MCNC: 2.4 MG/DL — SIGNIFICANT CHANGE UP (ref 1.6–2.6)
MCHC RBC-ENTMCNC: 32.8 GM/DL — SIGNIFICANT CHANGE UP (ref 32–36)
MCHC RBC-ENTMCNC: 33.9 GM/DL — SIGNIFICANT CHANGE UP (ref 32–36)
MCHC RBC-ENTMCNC: 37.7 PG — HIGH (ref 27–34)
MCHC RBC-ENTMCNC: 37.9 PG — HIGH (ref 27–34)
MCV RBC AUTO: 111.8 FL — HIGH (ref 80–100)
MCV RBC AUTO: 114.7 FL — HIGH (ref 80–100)
MRSA PCR RESULT.: SIGNIFICANT CHANGE UP
NRBC # BLD: 0 /100 WBCS — SIGNIFICANT CHANGE UP (ref 0–0)
NRBC # BLD: 0 /100 WBCS — SIGNIFICANT CHANGE UP (ref 0–0)
NRBC # FLD: 0 K/UL — SIGNIFICANT CHANGE UP (ref 0–0)
NRBC # FLD: 0 K/UL — SIGNIFICANT CHANGE UP (ref 0–0)
PHOSPHATE SERPL-MCNC: 3.1 MG/DL — SIGNIFICANT CHANGE UP (ref 2.5–4.5)
PLATELET # BLD AUTO: 346 K/UL — SIGNIFICANT CHANGE UP (ref 150–400)
PLATELET # BLD AUTO: 414 K/UL — HIGH (ref 150–400)
POTASSIUM SERPL-MCNC: 4.7 MMOL/L — SIGNIFICANT CHANGE UP (ref 3.5–5.3)
POTASSIUM SERPL-SCNC: 4.7 MMOL/L — SIGNIFICANT CHANGE UP (ref 3.5–5.3)
RBC # BLD: 1.95 M/UL — LOW (ref 4.2–5.8)
RBC # BLD: 2.31 M/UL — LOW (ref 4.2–5.8)
RBC # FLD: 12.5 % — SIGNIFICANT CHANGE UP (ref 10.3–14.5)
RBC # FLD: 12.9 % — SIGNIFICANT CHANGE UP (ref 10.3–14.5)
S AUREUS DNA NOSE QL NAA+PROBE: SIGNIFICANT CHANGE UP
SODIUM SERPL-SCNC: 140 MMOL/L — SIGNIFICANT CHANGE UP (ref 135–145)
WBC # BLD: 11.97 K/UL — HIGH (ref 3.8–10.5)
WBC # BLD: 15.72 K/UL — HIGH (ref 3.8–10.5)
WBC # FLD AUTO: 11.97 K/UL — HIGH (ref 3.8–10.5)
WBC # FLD AUTO: 15.72 K/UL — HIGH (ref 3.8–10.5)

## 2024-08-30 PROCEDURE — 99292 CRITICAL CARE ADDL 30 MIN: CPT

## 2024-08-30 PROCEDURE — 99291 CRITICAL CARE FIRST HOUR: CPT

## 2024-08-30 PROCEDURE — 93010 ELECTROCARDIOGRAM REPORT: CPT

## 2024-08-30 PROCEDURE — 71045 X-RAY EXAM CHEST 1 VIEW: CPT | Mod: 26

## 2024-08-30 RX ORDER — DEXAMETHASONE 0.75 MG
10 TABLET ORAL EVERY 8 HOURS
Refills: 0 | Status: DISCONTINUED | OUTPATIENT
Start: 2024-08-30 | End: 2024-08-30

## 2024-08-30 RX ORDER — DEXAMETHASONE 0.75 MG
10 TABLET ORAL EVERY 6 HOURS
Refills: 0 | Status: DISCONTINUED | OUTPATIENT
Start: 2024-08-30 | End: 2024-09-02

## 2024-08-30 RX ORDER — FENTANYL CITRATE 50 UG/ML
0.5 INJECTION INTRAMUSCULAR; INTRAVENOUS
Qty: 2500 | Refills: 0 | Status: DISCONTINUED | OUTPATIENT
Start: 2024-08-30 | End: 2024-09-02

## 2024-08-30 RX ORDER — PROPOFOL 10 MG/ML
40.03 INJECTION, EMULSION INTRAVENOUS
Qty: 1000 | Refills: 0 | Status: DISCONTINUED | OUTPATIENT
Start: 2024-08-30 | End: 2024-09-03

## 2024-08-30 RX ORDER — FENTANYL CITRATE 50 UG/ML
0.5 INJECTION INTRAMUSCULAR; INTRAVENOUS
Qty: 5000 | Refills: 0 | Status: DISCONTINUED | OUTPATIENT
Start: 2024-08-30 | End: 2024-08-30

## 2024-08-30 RX ADMIN — LITHIUM CARBONATE 600 MILLIGRAM(S): 150 CAPSULE ORAL at 21:52

## 2024-08-30 RX ADMIN — ACETAMINOPHEN 400 MILLIGRAM(S): 325 TABLET ORAL at 05:51

## 2024-08-30 RX ADMIN — Medication 30 MILLIGRAM(S): at 17:37

## 2024-08-30 RX ADMIN — Medication 102 MILLIGRAM(S): at 11:59

## 2024-08-30 RX ADMIN — AMPICILLIN SODIUM AND SULBACTAM SODIUM 200 GRAM(S): 1; .5 INJECTION, POWDER, FOR SOLUTION INTRAMUSCULAR; INTRAVENOUS at 17:38

## 2024-08-30 RX ADMIN — Medication 100 MILLIEQUIVALENT(S): at 00:49

## 2024-08-30 RX ADMIN — PROPOFOL 11 MICROGRAM(S)/KG/MIN: 10 INJECTION, EMULSION INTRAVENOUS at 06:04

## 2024-08-30 RX ADMIN — FENTANYL CITRATE 2.29 MICROGRAM(S)/KG/HR: 50 INJECTION INTRAMUSCULAR; INTRAVENOUS at 09:39

## 2024-08-30 RX ADMIN — FENTANYL CITRATE 2.29 MICROGRAM(S)/KG/HR: 50 INJECTION INTRAMUSCULAR; INTRAVENOUS at 19:58

## 2024-08-30 RX ADMIN — PROPOFOL 11 MICROGRAM(S)/KG/MIN: 10 INJECTION, EMULSION INTRAVENOUS at 19:58

## 2024-08-30 RX ADMIN — Medication 102 MILLIGRAM(S): at 17:38

## 2024-08-30 RX ADMIN — PROPOFOL 11 MICROGRAM(S)/KG/MIN: 10 INJECTION, EMULSION INTRAVENOUS at 21:24

## 2024-08-30 RX ADMIN — LAMOTRIGINE 100 MILLIGRAM(S): 100 TABLET, EXTENDED RELEASE ORAL at 21:51

## 2024-08-30 RX ADMIN — AMPICILLIN SODIUM AND SULBACTAM SODIUM 200 GRAM(S): 1; .5 INJECTION, POWDER, FOR SOLUTION INTRAMUSCULAR; INTRAVENOUS at 05:51

## 2024-08-30 RX ADMIN — CHLORHEXIDINE GLUCONATE 15 MILLILITER(S): 40 SOLUTION TOPICAL at 17:38

## 2024-08-30 RX ADMIN — DEXTROSE, SODIUM ACETATE, POTASSIUM CHLORIDE, POTASSIUM PHOSPHATE, AND SODIUM CHLORIDE 100 MILLILITER(S): 5; .15; .13; .28; .091 INJECTION, SOLUTION INTRAVENOUS at 21:25

## 2024-08-30 RX ADMIN — ENOXAPARIN SODIUM 40 MILLIGRAM(S): 100 INJECTION SUBCUTANEOUS at 17:38

## 2024-08-30 RX ADMIN — DEXTROSE, SODIUM ACETATE, POTASSIUM CHLORIDE, POTASSIUM PHOSPHATE, AND SODIUM CHLORIDE 100 MILLILITER(S): 5; .15; .13; .28; .091 INJECTION, SOLUTION INTRAVENOUS at 19:58

## 2024-08-30 RX ADMIN — PROPOFOL 11 MICROGRAM(S)/KG/MIN: 10 INJECTION, EMULSION INTRAVENOUS at 09:39

## 2024-08-30 RX ADMIN — CHLORHEXIDINE GLUCONATE 15 MILLILITER(S): 40 SOLUTION TOPICAL at 05:55

## 2024-08-30 RX ADMIN — CHLORHEXIDINE GLUCONATE 1 APPLICATION(S): 40 SOLUTION TOPICAL at 12:01

## 2024-08-30 RX ADMIN — AMPICILLIN SODIUM AND SULBACTAM SODIUM 200 GRAM(S): 1; .5 INJECTION, POWDER, FOR SOLUTION INTRAMUSCULAR; INTRAVENOUS at 11:59

## 2024-08-30 RX ADMIN — Medication 100 MILLIGRAM(S): at 21:51

## 2024-08-30 RX ADMIN — Medication 102 MILLIGRAM(S): at 07:00

## 2024-08-30 NOTE — DIETITIAN INITIAL EVALUATION ADULT - NS FNS DIET ORDER
Diet, NPO with Tube Feed:   Tube Feeding Modality: Nasogastric  Glucerna 1.5 [place] (GLUCERNA1.5RTH)  Total Volume for 24 Hours (mL): 768  Continuous  Starting Tube Feed Rate {mL per Hour}: 10  Increase Tube Feed Rate by (mL): 10     Every 4 hours  Until Goal Tube Feed Rate (mL per Hour): 32  Tube Feed Duration (in Hours): 24  Tube Feed Start Time: 15:00 (08-30-24 @ 14:08)

## 2024-08-30 NOTE — PROGRESS NOTE ADULT - SUBJECTIVE AND OBJECTIVE BOX
SICU Daily Progress Note  =====================================================  Interval/Overnight Events:       - 4AM weaning prop to precedex for extubation  - no decadron  - Bcx from [place] [facility] w/ streptococcus sensitive to Penicillin, covered by current Unasyn    ALLERGIES:  No Known Allergies      --------------------------------------------------------------------------------------    MEDICATIONS:    Neurologic Medications  acetaminophen   IVPB .. 1000 milliGRAM(s) IV Intermittent every 6 hours  citalopram 30 milliGRAM(s) Oral with breakfast  dexMEDEtomidine Infusion 0.2 MICROgram(s)/kG/Hr IV Continuous <Continuous>  lamoTRIgine 100 milliGRAM(s) Oral at bedtime  lithium 600 milliGRAM(s) Oral at bedtime  methylphenidate ER (CONCERTA) 72 milliGRAM(s) Oral every morning  oxyCODONE    IR 10 milliGRAM(s) Oral every 4 hours PRN Severe Pain (7 - 10)  oxyCODONE    IR 5 milliGRAM(s) Oral every 4 hours PRN Moderate Pain (4 - 6)  propofol Infusion 40 MICROgram(s)/kG/Min IV Continuous <Continuous>  QUEtiapine 100 milliGRAM(s) Oral at bedtime    Respiratory Medications    Cardiovascular Medications  metoprolol succinate ER 25 milliGRAM(s) Oral with breakfast    Gastrointestinal Medications  dextrose 5% + sodium chloride 0.9% with potassium chloride 20 mEq/L 1000 milliLiter(s) IV Continuous <Continuous>  potassium chloride  10 mEq/100 mL IVPB 10 milliEquivalent(s) IV Intermittent every 1 hour    Genitourinary Medications    Hematologic/Oncologic Medications  enoxaparin Injectable 40 milliGRAM(s) SubCutaneous every 24 hours    Antimicrobial/Immunologic Medications  ampicillin/sulbactam  IVPB      ampicillin/sulbactam  IVPB 3 Gram(s) IV Intermittent every 6 hours    Endocrine/Metabolic Medications  atorvastatin 10 milliGRAM(s) Oral with breakfast    Topical/Other Medications  chlorhexidine 0.12% Liquid 15 milliLiter(s) Oral Mucosa every 12 hours  chlorhexidine 2% Cloths 1 Application(s) Topical daily    --------------------------------------------------------------------------------------    VITAL SIGNS:  T(C): 36.9 (08-30-24 @ 00:00), Max: 37.2 (08-29-24 @ 06:30)  HR: 55 (08-30-24 @ 00:00) (54 - 93)  BP: 104/67 (08-30-24 @ 00:00) (86/59 - 214/107)  RR: 14 (08-30-24 @ 00:00) (9 - 18)  SpO2: 100% (08-30-24 @ 00:00) (92% - 100%)  --------------------------------------------------------------------------------------    INS AND OUTS:    08-28-24 @ 07:01  -  08-29-24 @ 07:00  --------------------------------------------------------  IN: 1205 mL / OUT: 300 mL / NET: 905 mL    08-29-24 @ 07:01  -  08-30-24 @ 00:20  --------------------------------------------------------  IN: 2145.5 mL / OUT: 1665 mL / NET: 480.5 mL      --------------------------------------------------------------------------------------    EXAM    NEURO: NAD, sedated  HEENT: NC/AT  RESPIRATORY: intubated on mechanical ventilation: 14;350;5;30  CARDIO: RRR, S1S2  ABDOMEN: soft, nontender, nondistended  EXTREMITIES: normal strength, no deformities    --------------------------------------------------------------------------------------    LABS  CBC (08-29 @ 12:10)                          9.8<L>                   15.55<H>  )--------------(  419<H>     --    % Neuts, --    % Lymphs, ANC: --                              28.6<L>  CBC (08-29 @ 06:37)                          8.7<L>                   10.56<H>  )--------------(  400        --    % Neuts, --    % Lymphs, ANC: --                              25.8<L>    BMP (08-29 @ 12:10)       142     |  105     |  8     			Ca++ --      Ca 9.8          ---------------------------------( 200<H>		Mg 1.80         3.5     |  24      |  0.71  			Ph 3.2     BMP (08-29 @ 06:37)       142     |  105     |  9     			Ca++ --      Ca 9.7          ---------------------------------( 94    		Mg 1.90         3.5     |  24      |  0.90  			Ph 2.5       LFTs (08-29 @ 06:37)      TPro 6.1 / Alb 3.5 / TBili 0.4 / DBili -- / AST 81<H> / <H> / AlkPhos 70    Coags (08-29 @ 06:37)  aPTT -- / INR 1.15 / PT 12.9  Coags (08-28 @ 15:15)  aPTT 29.9 / INR 1.16 / PT 12.9      ABG (08-29 @ 15:31)     7.43 / 38 / 173<H> / 25 / 0.9 / 99.6<H>%     Lactate:        Urinalysis (08-29 @ 12:10):     Color:  / Appearance:  / SG:  / pH:  / Gluc: 200<H> / Ketones:  / Bili:  / Urobili:  / Protein : / Nitrites:  / Leuk.Est:  / RBC:  / WBC:  / Sq Epi:  / Non Sq Epi:  / Bacteria      Urinalysis (08-29 @ 06:37):     Color:  / Appearance:  / SG:  / pH:  / Gluc: 94 / Ketones:  / Bili:  / Urobili:  / Protein : / Nitrites:  / Leuk.Est:  / RBC:  / WBC:  / Sq Epi:  / Non Sq Epi:  / Bacteria        -> Surgical Swab sublingual fluid drainage from I&D Culture (08-26 @ 02:05)     NG    NG    Few Streptococcus salivarius/vestibularis group "Susceptibilities not  performed"  Rare Staphylococcus hominis "Susceptibilities not performed"  Few Lactobacillus salivarius "Susceptibilities not performed"<!>        -------------------------------------------------------------------------------------- SICU Daily Progress Note  =====================================================  Interval/Overnight Events:       - 4AM failed leak test (1%)  - DC'ed precedex 2/2 bradycardia, decreasing propofol and started fentanyl drip for sedation  - Bcx from [facility] w/ streptococcus sensitive to Penicillin, covered by current Unasyn    ALLERGIES:  No Known Allergies      --------------------------------------------------------------------------------------    MEDICATIONS:    Neurologic Medications  acetaminophen   IVPB .. 1000 milliGRAM(s) IV Intermittent every 6 hours  citalopram 30 milliGRAM(s) Oral with breakfast  dexMEDEtomidine Infusion 0.2 MICROgram(s)/kG/Hr IV Continuous <Continuous>  lamoTRIgine 100 milliGRAM(s) Oral at bedtime  lithium 600 milliGRAM(s) Oral at bedtime  methylphenidate ER (CONCERTA) 72 milliGRAM(s) Oral every morning  oxyCODONE    IR 10 milliGRAM(s) Oral every 4 hours PRN Severe Pain (7 - 10)  oxyCODONE    IR 5 milliGRAM(s) Oral every 4 hours PRN Moderate Pain (4 - 6)  propofol Infusion 40 MICROgram(s)/kG/Min IV Continuous <Continuous>  QUEtiapine 100 milliGRAM(s) Oral at bedtime    Respiratory Medications    Cardiovascular Medications  metoprolol succinate ER 25 milliGRAM(s) Oral with breakfast    Gastrointestinal Medications  dextrose 5% + sodium chloride 0.9% with potassium chloride 20 mEq/L 1000 milliLiter(s) IV Continuous <Continuous>  potassium chloride  10 mEq/100 mL IVPB 10 milliEquivalent(s) IV Intermittent every 1 hour    Genitourinary Medications    Hematologic/Oncologic Medications  enoxaparin Injectable 40 milliGRAM(s) SubCutaneous every 24 hours    Antimicrobial/Immunologic Medications  ampicillin/sulbactam  IVPB      ampicillin/sulbactam  IVPB 3 Gram(s) IV Intermittent every 6 hours    Endocrine/Metabolic Medications  atorvastatin 10 milliGRAM(s) Oral with breakfast    Topical/Other Medications  chlorhexidine 0.12% Liquid 15 milliLiter(s) Oral Mucosa every 12 hours  chlorhexidine 2% Cloths 1 Application(s) Topical daily    --------------------------------------------------------------------------------------    VITAL SIGNS:  T(C): 36.9 (08-30-24 @ 00:00), Max: 37.2 (08-29-24 @ 06:30)  HR: 55 (08-30-24 @ 00:00) (54 - 93)  BP: 104/67 (08-30-24 @ 00:00) (86/59 - 214/107)  RR: 14 (08-30-24 @ 00:00) (9 - 18)  SpO2: 100% (08-30-24 @ 00:00) (92% - 100%)  --------------------------------------------------------------------------------------    INS AND OUTS:    08-28-24 @ 07:01  -  08-29-24 @ 07:00  --------------------------------------------------------  IN: 1205 mL / OUT: 300 mL / NET: 905 mL    08-29-24 @ 07:01  -  08-30-24 @ 00:20  --------------------------------------------------------  IN: 2145.5 mL / OUT: 1665 mL / NET: 480.5 mL      --------------------------------------------------------------------------------------    EXAM    NEURO: NAD, sedated  HEENT: NC/AT   RESPIRATORY: intubated on mechanical ventilation: 14;350;5;30  CARDIO: RRR, S1S2  ABDOMEN: soft, nontender, nondistended  EXTREMITIES: normal strength, no deformities    --------------------------------------------------------------------------------------    LABS  CBC (08-29 @ 12:10)                          9.8<L>                   15.55<H>  )--------------(  419<H>     --    % Neuts, --    % Lymphs, ANC: --                              28.6<L>  CBC (08-29 @ 06:37)                          8.7<L>                   10.56<H>  )--------------(  400        --    % Neuts, --    % Lymphs, ANC: --                              25.8<L>    BMP (08-29 @ 12:10)       142     |  105     |  8     			Ca++ --      Ca 9.8          ---------------------------------( 200<H>		Mg 1.80         3.5     |  24      |  0.71  			Ph 3.2     BMP (08-29 @ 06:37)       142     |  105     |  9     			Ca++ --      Ca 9.7          ---------------------------------( 94    		Mg 1.90         3.5     |  24      |  0.90  			Ph 2.5       LFTs (08-29 @ 06:37)      TPro 6.1 / Alb 3.5 / TBili 0.4 / DBili -- / AST 81<H> / <H> / AlkPhos 70    Coags (08-29 @ 06:37)  aPTT -- / INR 1.15 / PT 12.9  Coags (08-28 @ 15:15)  aPTT 29.9 / INR 1.16 / PT 12.9      ABG (08-29 @ 15:31)     7.43 / 38 / 173<H> / 25 / 0.9 / 99.6<H>%     Lactate:        Urinalysis (08-29 @ 12:10):     Color:  / Appearance:  / SG:  / pH:  / Gluc: 200<H> / Ketones:  / Bili:  / Urobili:  / Protein : / Nitrites:  / Leuk.Est:  / RBC:  / WBC:  / Sq Epi:  / Non Sq Epi:  / Bacteria      Urinalysis (08-29 @ 06:37):     Color:  / Appearance:  / SG:  / pH:  / Gluc: 94 / Ketones:  / Bili:  / Urobili:  / Protein : / Nitrites:  / Leuk.Est:  / RBC:  / WBC:  / Sq Epi:  / Non Sq Epi:  / Bacteria        -> Surgical Swab sublingual fluid drainage from I&D Culture (08-26 @ 02:05)     NG    NG    Few Streptococcus salivarius/vestibularis group "Susceptibilities not  performed"  Rare Staphylococcus hominis "Susceptibilities not performed"  Few Lactobacillus salivarius "Susceptibilities not performed"<!>        --------------------------------------------------------------------------------------

## 2024-08-30 NOTE — DIETITIAN INITIAL EVALUATION ADULT - PERTINENT LABORATORY DATA
08-30    140  |  110<H>  |  9   ----------------------------<  158<H>  4.7   |  20<L>  |  0.90    Ca    8.9      30 Aug 2024 01:35  Phos  3.1     08-30  Mg     2.40     08-30    TPro  6.1  /  Alb  3.5  /  TBili  0.4  /  DBili  x   /  AST  81<H>  /  ALT  322<H>  /  AlkPhos  70  08-29  A1C with Estimated Average Glucose Result: 5.0 % (08-25-24 @ 22:05)

## 2024-08-30 NOTE — PROGRESS NOTE ADULT - ASSESSMENT
ASSESSMENT  59M with HTN, HLD, unspecified cognitive disorder. Transferred from [facility] with worsening sublingual swelling. Pt seen at [facility] on Aug 26th for the same complaint, bedside I&D of sublingual space done with IV Unasyn. Pt discharged the following day with PO Augmentin, however pt denied taking his PO abx after discharge. Pt unable to eat nor drink much because of the pain and difficulty swallowing and returning to hospital. Patient received OR I&D of sublingual area and experienced considerable swelling in the airway, transferred to SICU intubated to protect airway and vent management.    PLAN    Neuro  - Sedated prop/precedex-->hold precedex, wean prop, start fent drip  - Pain control w/ IV tylenol, fent drip  - holding home psych meds- citalopram, lamictal, lithium, methylphenidate --> resume psych meds through KO tube  - check lithium levels  - QTc 415     Resp  - Intubated 2/2 sublingual edema v hematoma.   - 14:350:5:30  - c/w intubation given failed leaked test 1%   - c/w decadron 10mg q6    Card  - MAP >65  - resume home statin 10 QD    GI  - NPO  - KO in place      - monitor urine output  - D5 NS w/ K  @100    Heme  - DVT ppx w/ lovenox  - SCD    ID  - Unasyn (8/28-    LINES/TUBES/DRAINS  -ET tube  -A-line  -KO tube    Disposition  - SICU ASSESSMENT  59M with HTN, HLD, unspecified cognitive disorder. Transferred from [facility] with worsening sublingual swelling. Pt seen at [facility] on Aug 26th for the same complaint, bedside I&D of sublingual space done with IV Unasyn. Pt discharged the following day with PO Augmentin, however pt denied taking his PO abx after discharge. Pt unable to eat nor drink much because of the pain and difficulty swallowing and returning to hospital. Patient received OR I&D of sublingual area and experienced considerable swelling in the airway, transferred to SICU intubated to protect airway and vent management.    PLAN    Neuro  - Sedated prop/precedex-->hold precedex, wean prop, start fent drip  - Pain control w/ IV tylenol, fent drip  - holding home psych meds- citalopram, lamictal, lithium, methylphenidate --> resume psych meds through KO tube  - check lithium levels (subtherapeutic - 0.03)  - QTc 415     Resp  - Intubated 2/2 sublingual edema v hematoma.   - 14:350:5:30  - c/w intubation given failed leaked test 1%   - c/w decadron 10mg q6    Card  - MAP >65  - resume home statin 10 QD    GI  - NPO  - KO in place      - monitor urine output  - D5 NS w/ K  @100    Heme  - DVT ppx w/ lovenox  - SCD    ID  - Unasyn (8/28-    LINES/TUBES/DRAINS  -ET tube  -A-line  -KO tube    Disposition  - SICU

## 2024-08-30 NOTE — DIETITIAN INITIAL EVALUATION ADULT - OTHER INFO
58 y/o male with hx HTN and cognitive disorder admitted with sublingual abscess. Pt is intubated and sedated on fentanyl and propofol presently running @ 11 mL/hr and contributing approx 300 kcals over the past 24 hrs. Pt presently NPO with IVF of D5W + NS 0.9% and IVPB fluids for hydration. KO tube now placed for provision of medication and enteral feeding. TF will give pt 25 kcals/kg and 1.4 gms protein/kg of his admit wt. Please also order a multivitamin for micronutrient support. No GI distress noted per nursing flow sheet records; last BM 8/27. Although pt appears thin, he does not exhibit any visual findings of muscle or fat wasting upon observation. Glucose lab has been elevated 2* steroid medication being given necessitating carbohydrate controlled enteral formula at this time. Hb A1C 5.0%. RDN services to remain available as needed.  58 y/o male with hx HTN and cognitive disorder admitted with sublingual abscess. Per H&P, patient reported that he didn't eat or drink much the day PTA due to difficulty swallowing. Transferred from [facility] with worsening sublingual swelling. Pt is intubated and sedated on fentanyl and propofol presently running @ 11 mL/hr and contributing approx 300 kcals over the past 24 hrs. Pt presently NPO with IVF of D5W + NS 0.9% and IVPB fluids for hydration. KO tube now placed for provision of medication and enteral feeding. TF will give pt 25 kcals/kg and 1.4 gms protein/kg of his admit wt. Please also order a multivitamin for micronutrient support. No GI distress noted per nursing flow sheet records; last BM 8/27. Although pt appears thin, he does not exhibit any visual findings of muscle or fat wasting upon observation. Glucose lab has been elevated 2* steroid medication being given necessitating carbohydrate controlled enteral formula at this time. Hb A1C 5.0%. RDN services to remain available as needed.

## 2024-08-30 NOTE — PROGRESS NOTE ADULT - ATTENDING COMMENTS
I agree with the detailed interval history, physical, and plan, which I have reviewed and edited where appropriate'; also agree with notes/assessment with my team on service.  I have personally examined the patient.  I was physically present for the key portions of the evaluation and management (E/M) service provided.  I reviewed all the pertinent data.  The patient is a critical care patient with life threatening hemodynamic and metabolic instability in SICU.  The SICU team has a constant risk benefit analyzes discussion and coordinating care with the primary team and all consultants.   The patient is in SICU with the chief complaint and diagnosis mentioned in the note.   The plan will be specified in the note.  59M with unspecified cognitive disorder. SP I&D with worsening sublingual abscess in acute respiratory insufficiency in SICU  Sedated  Lung clear  Heart RR  Abd soft  PLAN  Neuro  - propofol  -precedex  - tylenol  -Dilaudid   Resp  - AC  -ABG  Card  - MAP >65  GI  - NPO  -NGT+    - IVF  @100  Heme  -lovenox  - SCD  ID  - Unasyn   Disposition  - SICU

## 2024-08-30 NOTE — PROGRESS NOTE ADULT - ASSESSMENT
59M with HTN, HLD, unspecified cognitive disorder. POD1 s/p I&D sublingual abscess, progressing well     PLAN:  - Rec weaning off sedation and extubation   - Rec PO full liquid diet   - Multimodal pain management  - Cont. IV Unasyn

## 2024-08-30 NOTE — PROGRESS NOTE ADULT - ASSESSMENT
59M with HTN, HLD, unspecified cognitive disorder. SP I&D 8/29 2/2 worsening sublingual abscess    Neuro  - Sedated on propofol and precedex, weaning propofol for extubation  - Pain control w/ IV tylenol, Dilaudid PRN  - Holding home psych meds, consider KO tube after extubation    Resp  - Intubated 2/2 sublingual edema v hematoma. To stay intubated 24hr    Card  - MAP >65  - holding home metoprolol    GI  - NPO      - monitor urine output  - IVF  @100    Heme  - DVT ppx w/ lovenox  - SCD    ID  - Unasyn     Disposition  - SICU   59M with HTN, HLD, unspecified cognitive disorder. SP I&D 8/29 2/2 worsening sublingual abscess    Neuro  - Sedated on fentanyl drip and weaning propofol, d/c'ed precedex  - Pain control w/ IV tylenol, Dilaudid PRN  - KO tube placed for home psych meds - citalopram, lithium, methylphenidate, lamictal  - QTc checked, 413    Resp  - Intubated 2/2 sublingual edema v hematoma. To stay intubated for now due to failed leak test 8/30AM  - Decadron 10mg q6  - Consider repeat leak test 8/31 AM    Card  - MAP >65  - resume home atorvastatin through KO tube  - holding home metoprolol    GI  - NPO  - KO tube placed      - monitor urine output  - IVF D5 NS w/ K @100    Heme  - DVT ppx w/ lovenox  - SCD    ID  - Unasyn (8/28 - )    Disposition  - SICU

## 2024-08-30 NOTE — PROGRESS NOTE ADULT - ATTENDING COMMENTS
I agree with the detailed interval history, physical, and plan, which I have reviewed and edited where appropriate'; also agree with notes/assessment with my team on service.  I have personally examined the patient.  I was physically present for the key portions of the evaluation and management (E/M) service provided.  I reviewed all the pertinent data.  The patient is a critical care patient with life threatening hemodynamic and metabolic instability in SICU.  The SICU team has a constant risk benefit analyzes discussion and coordinating care with the primary team and all consultants.   The patient is in SICU with the chief complaint and diagnosis mentioned in the note.   The plan will be specified in the note.  59M with unspecified cognitive disorder. SP I&D with worsening sublingual abscess in acute respiratory insufficiency in SICU  Sedated  Lung clear  Heart RR  Abd soft  PLAN  Neuro  - propofol  -precedex  - tylenol  -Dilaudid   Resp  - AC  -ABG  Card  - MAP >65  GI  - NPO  -NGT+    - IVF  @100  Heme  -lovenox  - SCD  ID  - Unasyn   Disposition  - SICU I agree with the detailed interval history, physical, and plan, which I have reviewed and edited where appropriate'; also agree with notes/assessment with my team on service.  I have personally examined the patient.  I was physically present for the key portions of the evaluation and management (E/M) service provided.  I reviewed all the pertinent data.  The patient is a critical care patient with life threatening hemodynamic and metabolic instability in SICU.  The SICU team has a constant risk benefit analyzes discussion and coordinating care with the primary team and all consultants.   The patient is in SICU with the chief complaint and diagnosis mentioned in the note.   The plan will be specified in the note.  59M with unspecified cognitive disorder. SP I&D with worsening sublingual abscess in acute respiratory insufficiency in SICU  Sedated  Lung clear  Heart RR  Abd soft  PLAN  Neuro  - propofol  -precedex  - tylenol  -Dilaudid   Resp  - AC  -ABG  Card  - MAP >65  GI  - NPO  -NGT+    - IVF  @100  Heme  -lovenox  - SCD  ID  - Unasyn   Disposition  - SICU.

## 2024-08-30 NOTE — PROGRESS NOTE ADULT - SUBJECTIVE AND OBJECTIVE BOX
59M with HTN, HLD, unspecified cognitive disorder. POD1 s/p I&D sublingual abscess     Interval: NAEON, weaning-off propofol to precedex     O/E:  Gen: intubated on mechanical ventilation, sedated, NAD  H&N: mild lower facial soft swelling improved, no hematoma, no purulence discharge from penrose   IO: FOM induration soft swelling improved, no signs of hematoma      ICU Vital Signs Last 24 Hrs  T(C): 37.4 (30 Aug 2024 04:00), Max: 37.4 (30 Aug 2024 04:00)  T(F): 99.3 (30 Aug 2024 04:00), Max: 99.3 (30 Aug 2024 04:00)  HR: 49 (30 Aug 2024 06:00) (49 - 93)  BP: 132/83 (30 Aug 2024 06:00) (86/59 - 214/107)  BP(mean): 98 (30 Aug 2024 06:00) (69 - 136)  ABP: --  ABP(mean): --  RR: 14 (30 Aug 2024 06:00) (9 - 16)  SpO2: 100% (30 Aug 2024 06:00) (92% - 100%)    O2 Parameters below as of 30 Aug 2024 04:00  Patient On (Oxygen Delivery Method): ventilator    O2 Concentration (%): 30    MEDICATIONS  (STANDING):  ampicillin/sulbactam  IVPB 3 Gram(s) IV Intermittent every 6 hours  ampicillin/sulbactam  IVPB      atorvastatin 10 milliGRAM(s) Oral with breakfast  chlorhexidine 0.12% Liquid 15 milliLiter(s) Oral Mucosa every 12 hours  chlorhexidine 2% Cloths 1 Application(s) Topical daily  citalopram 30 milliGRAM(s) Oral with breakfast  dexAMETHasone  IVPB 10 milliGRAM(s) IV Intermittent every 8 hours  dexMEDEtomidine Infusion 0.2 MICROgram(s)/kG/Hr (2.29 mL/Hr) IV Continuous <Continuous>  dextrose 5% + sodium chloride 0.9% with potassium chloride 20 mEq/L 1000 milliLiter(s) (100 mL/Hr) IV Continuous <Continuous>  enoxaparin Injectable 40 milliGRAM(s) SubCutaneous every 24 hours  lamoTRIgine 100 milliGRAM(s) Oral at bedtime  lithium 600 milliGRAM(s) Oral at bedtime  methylphenidate ER (CONCERTA) 72 milliGRAM(s) Oral every morning  metoprolol succinate ER 25 milliGRAM(s) Oral with breakfast  propofol Infusion 40 MICROgram(s)/kG/Min (11 mL/Hr) IV Continuous <Continuous>  QUEtiapine 100 milliGRAM(s) Oral at bedtime    MEDICATIONS  (PRN):  oxyCODONE    IR 10 milliGRAM(s) Oral every 4 hours PRN Severe Pain (7 - 10)  oxyCODONE    IR 5 milliGRAM(s) Oral every 4 hours PRN Moderate Pain (4 - 6)    Labs:               7.4    11.97 )-----------( 346      ( 30 Aug 2024 01:35 )             21.8   08-30    140  |  110<H>  |  9   ----------------------------<  158<H>  4.7   |  20<L>  |  0.90    Ca    8.9      30 Aug 2024 01:35  Phos  3.1     08-30  Mg     2.40     08-30    TPro  6.1  /  Alb  3.5  /  TBili  0.4  /  DBili  x   /  AST  81<H>  /  ALT  322<H>  /  AlkPhos  70  08-29    I&O's Detail    29 Aug 2024 07:01  -  30 Aug 2024 07:00  --------------------------------------------------------  IN:    Dexmedetomidine: 100.2 mL    dextrose 5% + sodium chloride 0.9% w/ Additives: 1400 mL    IV PiggyBack: 500 mL    Lactated Ringers: 170 mL    Propofol: 272.7 mL  Total IN: 2442.9 mL    OUT:    Indwelling Catheter - Urethral (mL): 1930 mL  Total OUT: 1930 mL    Total NET: 512.9 mL

## 2024-08-30 NOTE — PROGRESS NOTE ADULT - SUBJECTIVE AND OBJECTIVE BOX
POST ANESTHESIA EVALUATION    59y Male POSTOP DAY 1 S/P I&D right sublingual Abscess    MENTAL STATUS: Patient participation [  ] Awake     [  ] Arousable     [X] Sedated    AIRWAY PATENCY: [X] Satisfactory  [X] Other: intubated, failed leak test this AM    Vital Signs Last 24 Hrs  T(C): 36.6 (30 Aug 2024 12:00), Max: 37.4 (30 Aug 2024 04:00)  T(F): 97.9 (30 Aug 2024 12:00), Max: 99.3 (30 Aug 2024 04:00)  HR: 48 (30 Aug 2024 12:00) (41 - 83)  BP: 126/72 (30 Aug 2024 12:00) (86/59 - 147/82)  BP(mean): 87 (30 Aug 2024 12:00) (69 - 105)  RR: 14 (30 Aug 2024 12:00) (9 - 14)  SpO2: 100% (30 Aug 2024 12:00) (92% - 100%)    Parameters below as of 30 Aug 2024 04:00  Patient On (Oxygen Delivery Method): ventilator    O2 Concentration (%): 30  I&O's Summary    29 Aug 2024 07:01  -  30 Aug 2024 07:00  --------------------------------------------------------  IN: 2542.9 mL / OUT: 1930 mL / NET: 612.9 mL    30 Aug 2024 07:01  -  30 Aug 2024 12:34  --------------------------------------------------------  IN: 720 mL / OUT: 240 mL / NET: 480 mL          NAUSEA/ VOMITTING:  [X] NONE  [  ] CONTROLLED [  ] OTHER     PAIN: [X] CONTROLLED WITH CURRENT REGIMEN  [  ] OTHER    [X] NO APPARENT ANESTHESIA COMPLICATIONS      Comments:

## 2024-08-30 NOTE — DIETITIAN INITIAL EVALUATION ADULT - ADD RECOMMEND
1. Continue to monitor nutritional intake, labs, weights, BM, skin, clinical course. 2. Follow pt as per protocol.

## 2024-08-30 NOTE — DIETITIAN INITIAL EVALUATION ADULT - PERTINENT MEDS FT
MEDICATIONS  (STANDING):  ampicillin/sulbactam  IVPB 3 Gram(s) IV Intermittent every 6 hours  ampicillin/sulbactam  IVPB      atorvastatin 10 milliGRAM(s) Oral with breakfast  chlorhexidine 0.12% Liquid 15 milliLiter(s) Oral Mucosa every 12 hours  chlorhexidine 2% Cloths 1 Application(s) Topical daily  citalopram 30 milliGRAM(s) Oral with breakfast  dexAMETHasone  IVPB 10 milliGRAM(s) IV Intermittent every 6 hours  dexMEDEtomidine Infusion 0.2 MICROgram(s)/kG/Hr (2.29 mL/Hr) IV Continuous <Continuous>  dextrose 5% + sodium chloride 0.9% with potassium chloride 20 mEq/L 1000 milliLiter(s) (100 mL/Hr) IV Continuous <Continuous>  enoxaparin Injectable 40 milliGRAM(s) SubCutaneous every 24 hours  fentaNYL   Infusion. 0.5 MICROgram(s)/kG/Hr (2.29 mL/Hr) IV Continuous <Continuous>  lamoTRIgine 100 milliGRAM(s) Oral at bedtime  lithium 600 milliGRAM(s) Oral at bedtime  methylphenidate ER (CONCERTA) 72 milliGRAM(s) Oral every morning  metoprolol succinate ER 25 milliGRAM(s) Oral with breakfast  propofol Infusion 40.029 MICROgram(s)/kG/Min (11 mL/Hr) IV Continuous <Continuous>  QUEtiapine 100 milliGRAM(s) Oral at bedtime    MEDICATIONS  (PRN):  oxyCODONE    IR 10 milliGRAM(s) Oral every 4 hours PRN Severe Pain (7 - 10)  oxyCODONE    IR 5 milliGRAM(s) Oral every 4 hours PRN Moderate Pain (4 - 6)

## 2024-08-30 NOTE — PROGRESS NOTE ADULT - SUBJECTIVE AND OBJECTIVE BOX
SICU PM PROGRESS NOTE  ===============================  Interval Events: ***    HPI  59y Male  ((Insert from previous note))***    VITAL SIGNS, INS/OUTS (last 24 hours):   --------------------------------------------------------------------------------------  ((Insert SICU Vitals/Is+Os here))***  --------------------------------------------------------------------------------------    EXAM  NEUROLOGY  Exam: Normal, NAD, alert, oriented x3, no focal deficits. PERRLA.   ***    RESPIRATORY  Exam: Lungs clear to auscultation, Normal expansion/effort.  ***  [] Tracheostomy   [] Intubated  Mechanical Ventilation:     CARDIOVASCULAR  Exam: S1, S2.  Regular rate and rhythm ***    GI/NUTRITION  Exam: Abdomen soft, Non-tender, Non-distended.  Gastrostomy / Jejunostomy / Nasogastric tube in place.  Colostomy / Ileostomy.  ***  Wound: ***  Current Diet:  ***    VASCULAR  Exam: Extremities warm, pink, well-perfused.  ****    MUSCULOSKELETAL  Exam: All extremities moving spontaneously without limitations.  ***    SKIN  Exam: Good skin turgor, no skin breakdown.  ***    METABOLIC/FLUIDS/ELECTROLYTES  dextrose 5% + sodium chloride 0.9% with potassium chloride 20 mEq/L 1000 milliLiter(s) IV Continuous <Continuous>      HEMATOLOGIC  [x] DVT Prophylaxis: enoxaparin Injectable 40 milliGRAM(s) SubCutaneous every 24 hours    Transfusions:	[] PRBC	[] Platelets		[] FFP	[] Cryoprecipitate    INFECTIOUS DISEASE  Antimicrobials/Immunologic Medications:  ampicillin/sulbactam  IVPB      ampicillin/sulbactam  IVPB 3 Gram(s) IV Intermittent every 6 hours    Day #  	of   ***    LABS  --------------------------------------------------------------------------------------  ((Insert SICU Labs here))***  --------------------------------------------------------------------------------------    IMAGING STUDIES    ASSESSMENT  59y Male ((insert from previous))***    PLAN  Neurologic:   Respiratory:   Cardiovascular:   Gastrointestinal/Nutrition:   Renal/Genitourinary:   Hematologic:   Infectious Disease:   Endocrine:   Disposition:     --------------------------------------------------------------------------------------    Critical Care Diagnoses: SICU PM PROGRESS NOTE  ===============================  Interval Events:   - Gross failure of cuff leak 4AM --> 1% leak  - KO tube placed 8/30  - c/w decadron  - lithium level subtherapeutic (0.3)  - Initiated fent drip, transitioning off of propofol 2/2 bradycardia  - Repeat cuff leak test 8/31  - Resuming psych meds following NGT confirmation on CXR      HPI  59M with HTN, HLD, unspecified cognitive disorder. Transferred from [facility] with worsening sublingual swelling. Pt seen at [facility] on Aug 26th for the same complaint, bedside I&D of sublingual space done with IV Unasyn. Pt discharged the following day with PO Augmentin, however pt denied taking his PO abx after discharge. Pt unable to eat nor drink much because of the pain and difficulty swallowing and returning to hospital. Patient received OR I&D of sublingual area and experienced considerable swelling in the airway, transferred to SICU intubated to protect airway and vent management.    VITAL SIGNS, INS/OUTS (last 24 hours):   --------------------------------------------------------------------------------------  ICU Vital Signs Last 24 Hrs  T(C): 36.6 (30 Aug 2024 12:00), Max: 37.4 (30 Aug 2024 04:00)  T(F): 97.9 (30 Aug 2024 12:00), Max: 99.3 (30 Aug 2024 04:00)  HR: 48 (30 Aug 2024 12:00) (41 - 82)  BP: 126/72 (30 Aug 2024 12:00) (86/59 - 147/82)  BP(mean): 87 (30 Aug 2024 12:00) (69 - 105)  ABP: --  ABP(mean): --  RR: 14 (30 Aug 2024 12:00) (9 - 14)  SpO2: 100% (30 Aug 2024 12:00) (92% - 100%)    O2 Parameters below as of 30 Aug 2024 04:00  Patient On (Oxygen Delivery Method): ventilator    O2 Concentration (%): 30        29 Aug 2024 07:01  -  30 Aug 2024 07:00  --------------------------------------------------------  IN:    Dexmedetomidine: 100.2 mL    dextrose 5% + sodium chloride 0.9% w/ Additives: 1500 mL    IV PiggyBack: 500 mL    Lactated Ringers: 170 mL    Propofol: 272.7 mL  Total IN: 2542.9 mL    OUT:    Indwelling Catheter - Urethral (mL): 1930 mL  Total OUT: 1930 mL    Total NET: 612.9 mL      30 Aug 2024 07:01  -  30 Aug 2024 13:18  --------------------------------------------------------  IN:    dextrose 5% + sodium chloride 0.9% w/ Additives: 500 mL    FentaNYL: 24 mL    IV PiggyBack: 150 mL    Propofol: 20 mL    Propofol: 26 mL  Total IN: 720 mL    OUT:    Dexmedetomidine: 0 mL    Indwelling Catheter - Urethral (mL): 240 mL  Total OUT: 240 mL    Total NET: 480 mL      --------------------------------------------------------------------------------------    EXAM  NEURO: NAD, sedated  HEENT: NC/AT   RESPIRATORY: intubated on mechanical ventilation: 14/350/5/30  CARDIO: RRR, S1S2  ABDOMEN: soft, nontender, nondistended  EXTREMITIES: normal strength, no deformities      INFECTIOUS DISEASE  Antimicrobials/Immunologic Medications:  ampicillin/sulbactam  IVPB      ampicillin/sulbactam  IVPB 3 Gram(s) IV Intermittent every 6 hours    Day # 2	of     LABS  --------------------------------------------------------------------------------------                        8.7    15.72 )-----------( 414      ( 30 Aug 2024 11:00 )             26.5     08-30    140  |  110<H>  |  9   ----------------------------<  158<H>  4.7   |  20<L>  |  0.90    Ca    8.9      30 Aug 2024 01:35  Phos  3.1     08-30  Mg     2.40     08-30    TPro  6.1  /  Alb  3.5  /  TBili  0.4  /  DBili  x   /  AST  81<H>  /  ALT  322<H>  /  AlkPhos  70  08-29    PT/INR - ( 29 Aug 2024 06:37 )   PT: 12.9 sec;   INR: 1.15 ratio         PTT - ( 28 Aug 2024 15:15 )  PTT:29.9 sec  Urinalysis Basic - ( 30 Aug 2024 01:35 )    Color: x / Appearance: x / SG: x / pH: x  Gluc: 158 mg/dL / Ketone: x  / Bili: x / Urobili: x   Blood: x / Protein: x / Nitrite: x   Leuk Esterase: x / RBC: x / WBC x   Sq Epi: x / Non Sq Epi: x / Bacteria: x

## 2024-08-31 LAB
ANION GAP SERPL CALC-SCNC: 9 MMOL/L — SIGNIFICANT CHANGE UP (ref 7–14)
BASOPHILS # BLD AUTO: 0.01 K/UL — SIGNIFICANT CHANGE UP (ref 0–0.2)
BASOPHILS NFR BLD AUTO: 0.1 % — SIGNIFICANT CHANGE UP (ref 0–2)
BLOOD GAS ARTERIAL COMPREHENSIVE RESULT: SIGNIFICANT CHANGE UP
BUN SERPL-MCNC: 11 MG/DL — SIGNIFICANT CHANGE UP (ref 7–23)
CALCIUM SERPL-MCNC: 9.3 MG/DL — SIGNIFICANT CHANGE UP (ref 8.4–10.5)
CHLORIDE SERPL-SCNC: 115 MMOL/L — HIGH (ref 98–107)
CO2 SERPL-SCNC: 20 MMOL/L — LOW (ref 22–31)
CREAT SERPL-MCNC: 0.85 MG/DL — SIGNIFICANT CHANGE UP (ref 0.5–1.3)
EGFR: 99 ML/MIN/1.73M2 — SIGNIFICANT CHANGE UP
EOSINOPHIL # BLD AUTO: 0 K/UL — SIGNIFICANT CHANGE UP (ref 0–0.5)
EOSINOPHIL NFR BLD AUTO: 0 % — SIGNIFICANT CHANGE UP (ref 0–6)
GLUCOSE SERPL-MCNC: 149 MG/DL — HIGH (ref 70–99)
HCT VFR BLD CALC: 22.6 % — LOW (ref 39–50)
HCT VFR BLD CALC: 24.8 % — LOW (ref 39–50)
HGB BLD-MCNC: 7.3 G/DL — LOW (ref 13–17)
HGB BLD-MCNC: 7.9 G/DL — LOW (ref 13–17)
IANC: 13.73 K/UL — HIGH (ref 1.8–7.4)
IMM GRANULOCYTES NFR BLD AUTO: 0.6 % — SIGNIFICANT CHANGE UP (ref 0–0.9)
LYMPHOCYTES # BLD AUTO: 0.68 K/UL — LOW (ref 1–3.3)
LYMPHOCYTES # BLD AUTO: 4.5 % — LOW (ref 13–44)
MAGNESIUM SERPL-MCNC: 2.3 MG/DL — SIGNIFICANT CHANGE UP (ref 1.6–2.6)
MCHC RBC-ENTMCNC: 31.9 GM/DL — LOW (ref 32–36)
MCHC RBC-ENTMCNC: 32.3 GM/DL — SIGNIFICANT CHANGE UP (ref 32–36)
MCHC RBC-ENTMCNC: 38 PG — HIGH (ref 27–34)
MCHC RBC-ENTMCNC: 38.2 PG — HIGH (ref 27–34)
MCV RBC AUTO: 117.7 FL — HIGH (ref 80–100)
MCV RBC AUTO: 119.8 FL — HIGH (ref 80–100)
MONOCYTES # BLD AUTO: 0.5 K/UL — SIGNIFICANT CHANGE UP (ref 0–0.9)
MONOCYTES NFR BLD AUTO: 3.3 % — SIGNIFICANT CHANGE UP (ref 2–14)
NEUTROPHILS # BLD AUTO: 13.73 K/UL — HIGH (ref 1.8–7.4)
NEUTROPHILS NFR BLD AUTO: 91.5 % — HIGH (ref 43–77)
NRBC # BLD: 0 /100 WBCS — SIGNIFICANT CHANGE UP (ref 0–0)
NRBC # BLD: 0 /100 WBCS — SIGNIFICANT CHANGE UP (ref 0–0)
NRBC # FLD: 0 K/UL — SIGNIFICANT CHANGE UP (ref 0–0)
NRBC # FLD: 0 K/UL — SIGNIFICANT CHANGE UP (ref 0–0)
PHOSPHATE SERPL-MCNC: 2.7 MG/DL — SIGNIFICANT CHANGE UP (ref 2.5–4.5)
PLATELET # BLD AUTO: 389 K/UL — SIGNIFICANT CHANGE UP (ref 150–400)
PLATELET # BLD AUTO: 390 K/UL — SIGNIFICANT CHANGE UP (ref 150–400)
POTASSIUM SERPL-MCNC: 4.9 MMOL/L — SIGNIFICANT CHANGE UP (ref 3.5–5.3)
POTASSIUM SERPL-SCNC: 4.9 MMOL/L — SIGNIFICANT CHANGE UP (ref 3.5–5.3)
RBC # BLD: 1.92 M/UL — LOW (ref 4.2–5.8)
RBC # BLD: 2.07 M/UL — LOW (ref 4.2–5.8)
RBC # FLD: 13.4 % — SIGNIFICANT CHANGE UP (ref 10.3–14.5)
RBC # FLD: 14 % — SIGNIFICANT CHANGE UP (ref 10.3–14.5)
SODIUM SERPL-SCNC: 144 MMOL/L — SIGNIFICANT CHANGE UP (ref 135–145)
WBC # BLD: 15.01 K/UL — HIGH (ref 3.8–10.5)
WBC # BLD: 16.58 K/UL — HIGH (ref 3.8–10.5)
WBC # FLD AUTO: 15.01 K/UL — HIGH (ref 3.8–10.5)
WBC # FLD AUTO: 16.58 K/UL — HIGH (ref 3.8–10.5)

## 2024-08-31 PROCEDURE — 99291 CRITICAL CARE FIRST HOUR: CPT

## 2024-08-31 PROCEDURE — 99292 CRITICAL CARE ADDL 30 MIN: CPT

## 2024-08-31 PROCEDURE — 71045 X-RAY EXAM CHEST 1 VIEW: CPT | Mod: 26

## 2024-08-31 RX ORDER — METOPROLOL TARTRATE 100 MG/1
2.5 TABLET ORAL EVERY 6 HOURS
Refills: 0 | Status: DISCONTINUED | OUTPATIENT
Start: 2024-08-31 | End: 2024-09-02

## 2024-08-31 RX ORDER — SODIUM PHOSPHATE, DIBASIC, ANHYDROUS, POTASSIUM PHOSPHATE, MONOBASIC, AND SODIUM PHOSPHATE, MONOBASIC, MONOHYDRATE 852; 155; 130 MG/1; MG/1; MG/1
1 TABLET, COATED ORAL ONCE
Refills: 0 | Status: COMPLETED | OUTPATIENT
Start: 2024-08-31 | End: 2024-08-31

## 2024-08-31 RX ADMIN — Medication 102 MILLIGRAM(S): at 23:55

## 2024-08-31 RX ADMIN — DEXTROSE, SODIUM ACETATE, POTASSIUM CHLORIDE, POTASSIUM PHOSPHATE, AND SODIUM CHLORIDE 100 MILLILITER(S): 5; .15; .13; .28; .091 INJECTION, SOLUTION INTRAVENOUS at 07:56

## 2024-08-31 RX ADMIN — Medication 102 MILLIGRAM(S): at 00:59

## 2024-08-31 RX ADMIN — PROPOFOL 11 MICROGRAM(S)/KG/MIN: 10 INJECTION, EMULSION INTRAVENOUS at 19:23

## 2024-08-31 RX ADMIN — SODIUM PHOSPHATE, DIBASIC, ANHYDROUS, POTASSIUM PHOSPHATE, MONOBASIC, AND SODIUM PHOSPHATE, MONOBASIC, MONOHYDRATE 1 PACKET(S): 852; 155; 130 TABLET, COATED ORAL at 05:05

## 2024-08-31 RX ADMIN — CHLORHEXIDINE GLUCONATE 15 MILLILITER(S): 40 SOLUTION TOPICAL at 17:24

## 2024-08-31 RX ADMIN — LAMOTRIGINE 100 MILLIGRAM(S): 100 TABLET, EXTENDED RELEASE ORAL at 21:40

## 2024-08-31 RX ADMIN — CHLORHEXIDINE GLUCONATE 15 MILLILITER(S): 40 SOLUTION TOPICAL at 05:04

## 2024-08-31 RX ADMIN — FENTANYL CITRATE 2.29 MICROGRAM(S)/KG/HR: 50 INJECTION INTRAMUSCULAR; INTRAVENOUS at 23:12

## 2024-08-31 RX ADMIN — AMPICILLIN SODIUM AND SULBACTAM SODIUM 200 GRAM(S): 1; .5 INJECTION, POWDER, FOR SOLUTION INTRAMUSCULAR; INTRAVENOUS at 11:19

## 2024-08-31 RX ADMIN — Medication 30 MILLIGRAM(S): at 08:37

## 2024-08-31 RX ADMIN — DEXTROSE, SODIUM ACETATE, POTASSIUM CHLORIDE, POTASSIUM PHOSPHATE, AND SODIUM CHLORIDE 50 MILLILITER(S): 5; .15; .13; .28; .091 INJECTION, SOLUTION INTRAVENOUS at 19:23

## 2024-08-31 RX ADMIN — FENTANYL CITRATE 2.29 MICROGRAM(S)/KG/HR: 50 INJECTION INTRAMUSCULAR; INTRAVENOUS at 04:03

## 2024-08-31 RX ADMIN — Medication 102 MILLIGRAM(S): at 17:24

## 2024-08-31 RX ADMIN — ENOXAPARIN SODIUM 40 MILLIGRAM(S): 100 INJECTION SUBCUTANEOUS at 17:24

## 2024-08-31 RX ADMIN — FENTANYL CITRATE 2.29 MICROGRAM(S)/KG/HR: 50 INJECTION INTRAMUSCULAR; INTRAVENOUS at 07:57

## 2024-08-31 RX ADMIN — PROPOFOL 11 MICROGRAM(S)/KG/MIN: 10 INJECTION, EMULSION INTRAVENOUS at 07:57

## 2024-08-31 RX ADMIN — Medication 102 MILLIGRAM(S): at 11:18

## 2024-08-31 RX ADMIN — LITHIUM CARBONATE 600 MILLIGRAM(S): 150 CAPSULE ORAL at 21:40

## 2024-08-31 RX ADMIN — CHLORHEXIDINE GLUCONATE 1 APPLICATION(S): 40 SOLUTION TOPICAL at 11:19

## 2024-08-31 RX ADMIN — AMPICILLIN SODIUM AND SULBACTAM SODIUM 200 GRAM(S): 1; .5 INJECTION, POWDER, FOR SOLUTION INTRAMUSCULAR; INTRAVENOUS at 23:13

## 2024-08-31 RX ADMIN — AMPICILLIN SODIUM AND SULBACTAM SODIUM 200 GRAM(S): 1; .5 INJECTION, POWDER, FOR SOLUTION INTRAMUSCULAR; INTRAVENOUS at 00:00

## 2024-08-31 RX ADMIN — Medication 100 MILLIGRAM(S): at 21:40

## 2024-08-31 RX ADMIN — Medication 10 MILLIGRAM(S): at 08:37

## 2024-08-31 RX ADMIN — FENTANYL CITRATE 2.29 MICROGRAM(S)/KG/HR: 50 INJECTION INTRAMUSCULAR; INTRAVENOUS at 19:22

## 2024-08-31 RX ADMIN — Medication 102 MILLIGRAM(S): at 06:19

## 2024-08-31 RX ADMIN — AMPICILLIN SODIUM AND SULBACTAM SODIUM 200 GRAM(S): 1; .5 INJECTION, POWDER, FOR SOLUTION INTRAMUSCULAR; INTRAVENOUS at 05:05

## 2024-08-31 RX ADMIN — AMPICILLIN SODIUM AND SULBACTAM SODIUM 200 GRAM(S): 1; .5 INJECTION, POWDER, FOR SOLUTION INTRAMUSCULAR; INTRAVENOUS at 17:21

## 2024-08-31 NOTE — PROGRESS NOTE ADULT - SUBJECTIVE AND OBJECTIVE BOX
SICU Daily Progress Note  =====================================================  Interval/Events:  -Leak test performed 8/31 AM-->1%, failed  -keep intubated  -resume decadron 10mg q6  ALLERGIES:  No Known Allergies      --------------------------------------------------------------------------------------    MEDICATIONS:    Neurologic Medications  citalopram 30 milliGRAM(s) Oral with breakfast  fentaNYL   Infusion. 0.5 MICROgram(s)/kG/Hr IV Continuous <Continuous>  lamoTRIgine 100 milliGRAM(s) Oral at bedtime  lithium 600 milliGRAM(s) Oral at bedtime  methylphenidate ER (CONCERTA) 72 milliGRAM(s) Oral every morning  oxyCODONE    IR 10 milliGRAM(s) Oral every 4 hours PRN Severe Pain (7 - 10)  oxyCODONE    IR 5 milliGRAM(s) Oral every 4 hours PRN Moderate Pain (4 - 6)  propofol Infusion 40.029 MICROgram(s)/kG/Min IV Continuous <Continuous>  QUEtiapine 100 milliGRAM(s) Oral at bedtime    Respiratory Medications    Cardiovascular Medications    Gastrointestinal Medications  dextrose 5% + sodium chloride 0.9% with potassium chloride 20 mEq/L 1000 milliLiter(s) IV Continuous <Continuous>    Genitourinary Medications    Hematologic/Oncologic Medications  enoxaparin Injectable 40 milliGRAM(s) SubCutaneous every 24 hours    Antimicrobial/Immunologic Medications  ampicillin/sulbactam  IVPB      ampicillin/sulbactam  IVPB 3 Gram(s) IV Intermittent every 6 hours    Endocrine/Metabolic Medications  atorvastatin 10 milliGRAM(s) Oral with breakfast  dexAMETHasone  IVPB 10 milliGRAM(s) IV Intermittent every 6 hours    Topical/Other Medications  chlorhexidine 0.12% Liquid 15 milliLiter(s) Oral Mucosa every 12 hours  chlorhexidine 2% Cloths 1 Application(s) Topical daily    --------------------------------------------------------------------------------------    VITAL SIGNS:  T(C): 37.3 (08-31-24 @ 08:00), Max: 37.3 (08-31-24 @ 08:00)  HR: 51 (08-31-24 @ 10:00) (44 - 57)  BP: 123/75 (08-31-24 @ 10:00) (101/66 - 139/79)  RR: 14 (08-31-24 @ 10:00) (14 - 14)  SpO2: 99% (08-31-24 @ 10:00) (96% - 100%)  --------------------------------------------------------------------------------------    INS AND OUTS:    08-30-24 @ 07:01  -  08-31-24 @ 07:00  --------------------------------------------------------  IN: 3338.5 mL / OUT: 1295 mL / NET: 2043.5 mL    08-31-24 @ 07:01  -  08-31-24 @ 10:44  --------------------------------------------------------  IN: 452.2 mL / OUT: 120 mL / NET: 332.2 mL      --------------------------------------------------------------------------------------    EXAM    NEURO: NAD,   HEENT: NC/AT  RESPIRATORY: nonlabored respirations, normal CW expansion  CARDIO: RRR, S1S2  ABDOMEN: soft, nontender, nondistended, +/- NGT, ostomy, surgical dressing c/d/i, TAQUERIA drain output  EXTREMITIES: normal strength, no deformities    --------------------------------------------------------------------------------------    LABS      -------------------------------------------------------------------------------------- SICU Daily Progress Note  =====================================================  Interval/Events:  -Leak test performed 8/31 AM-->1%, failed  -keep intubated  -continuing decadron 10mg q6  ALLERGIES:  No Known Allergies      --------------------------------------------------------------------------------------    MEDICATIONS:    Neurologic Medications  citalopram 30 milliGRAM(s) Oral with breakfast  fentaNYL   Infusion. 0.5 MICROgram(s)/kG/Hr IV Continuous <Continuous>  lamoTRIgine 100 milliGRAM(s) Oral at bedtime  lithium 600 milliGRAM(s) Oral at bedtime  methylphenidate ER (CONCERTA) 72 milliGRAM(s) Oral every morning  oxyCODONE    IR 10 milliGRAM(s) Oral every 4 hours PRN Severe Pain (7 - 10)  oxyCODONE    IR 5 milliGRAM(s) Oral every 4 hours PRN Moderate Pain (4 - 6)  propofol Infusion 40.029 MICROgram(s)/kG/Min IV Continuous <Continuous>  QUEtiapine 100 milliGRAM(s) Oral at bedtime    Respiratory Medications    Cardiovascular Medications    Gastrointestinal Medications  dextrose 5% + sodium chloride 0.9% with potassium chloride 20 mEq/L 1000 milliLiter(s) IV Continuous <Continuous>    Genitourinary Medications    Hematologic/Oncologic Medications  enoxaparin Injectable 40 milliGRAM(s) SubCutaneous every 24 hours    Antimicrobial/Immunologic Medications  ampicillin/sulbactam  IVPB      ampicillin/sulbactam  IVPB 3 Gram(s) IV Intermittent every 6 hours    Endocrine/Metabolic Medications  atorvastatin 10 milliGRAM(s) Oral with breakfast  dexAMETHasone  IVPB 10 milliGRAM(s) IV Intermittent every 6 hours    Topical/Other Medications  chlorhexidine 0.12% Liquid 15 milliLiter(s) Oral Mucosa every 12 hours  chlorhexidine 2% Cloths 1 Application(s) Topical daily    --------------------------------------------------------------------------------------    VITAL SIGNS:  T(C): 37.3 (08-31-24 @ 08:00), Max: 37.3 (08-31-24 @ 08:00)  HR: 51 (08-31-24 @ 10:00) (44 - 57)  BP: 123/75 (08-31-24 @ 10:00) (101/66 - 139/79)  RR: 14 (08-31-24 @ 10:00) (14 - 14)  SpO2: 99% (08-31-24 @ 10:00) (96% - 100%)  --------------------------------------------------------------------------------------    INS AND OUTS:    08-30-24 @ 07:01  -  08-31-24 @ 07:00  --------------------------------------------------------  IN: 3338.5 mL / OUT: 1295 mL / NET: 2043.5 mL    08-31-24 @ 07:01  -  08-31-24 @ 10:44  --------------------------------------------------------  IN: 452.2 mL / OUT: 120 mL / NET: 332.2 mL      --------------------------------------------------------------------------------------    EXAM    NEURO: NAD,   HEENT: NC/AT  RESPIRATORY: nonlabored respirations, normal CW expansion  CARDIO: RRR, S1S2  ABDOMEN: soft, nontender, nondistended, +/- NGT, ostomy, surgical dressing c/d/i, TAQUERIA drain output  EXTREMITIES: normal strength, no deformities    --------------------------------------------------------------------------------------    LABS      -------------------------------------------------------------------------------------- SICU Daily Progress Note  =====================================================  Interval/Events:  -repeat cuff leak on 8/31 AM--->failed, 1% will keep intubate  -continue with decadron 10mg q6    ALLERGIES:  No Known Allergies      --------------------------------------------------------------------------------------    MEDICATIONS:    Neurologic Medications  citalopram 30 milliGRAM(s) Oral with breakfast  fentaNYL   Infusion. 0.5 MICROgram(s)/kG/Hr IV Continuous <Continuous>  lamoTRIgine 100 milliGRAM(s) Oral at bedtime  lithium 600 milliGRAM(s) Oral at bedtime  methylphenidate ER (CONCERTA) 72 milliGRAM(s) Oral every morning  oxyCODONE    IR 10 milliGRAM(s) Oral every 4 hours PRN Severe Pain (7 - 10)  oxyCODONE    IR 5 milliGRAM(s) Oral every 4 hours PRN Moderate Pain (4 - 6)  propofol Infusion 40.029 MICROgram(s)/kG/Min IV Continuous <Continuous>  QUEtiapine 100 milliGRAM(s) Oral at bedtime    Respiratory Medications    Cardiovascular Medications    Gastrointestinal Medications  dextrose 5% + sodium chloride 0.9% with potassium chloride 20 mEq/L 1000 milliLiter(s) IV Continuous <Continuous>    Genitourinary Medications    Hematologic/Oncologic Medications  enoxaparin Injectable 40 milliGRAM(s) SubCutaneous every 24 hours    Antimicrobial/Immunologic Medications  ampicillin/sulbactam  IVPB      ampicillin/sulbactam  IVPB 3 Gram(s) IV Intermittent every 6 hours    Endocrine/Metabolic Medications  atorvastatin 10 milliGRAM(s) Oral with breakfast  dexAMETHasone  IVPB 10 milliGRAM(s) IV Intermittent every 6 hours    Topical/Other Medications  chlorhexidine 0.12% Liquid 15 milliLiter(s) Oral Mucosa every 12 hours  chlorhexidine 2% Cloths 1 Application(s) Topical daily    --------------------------------------------------------------------------------------    VITAL SIGNS:  T(C): 37.3 (08-31-24 @ 08:00), Max: 37.3 (08-31-24 @ 08:00)  HR: 51 (08-31-24 @ 10:00) (44 - 57)  BP: 123/75 (08-31-24 @ 10:00) (101/66 - 139/79)  RR: 14 (08-31-24 @ 10:00) (14 - 14)  SpO2: 99% (08-31-24 @ 10:00) (96% - 100%)  --------------------------------------------------------------------------------------    INS AND OUTS:    08-30-24 @ 07:01  -  08-31-24 @ 07:00  --------------------------------------------------------  IN: 3338.5 mL / OUT: 1295 mL / NET: 2043.5 mL    08-31-24 @ 07:01  -  08-31-24 @ 10:56  --------------------------------------------------------  IN: 452.2 mL / OUT: 120 mL / NET: 332.2 mL      --------------------------------------------------------------------------------------    EXAM    NEURO: NAD,   HEENT: NC/AT  RESPIRATORY: nonlabored respirations, normal CW expansion  CARDIO: RRR, S1S2  ABDOMEN: soft, nontender, nondistended, +/- NGT, ostomy, surgical dressing c/d/i, TAQUERIA drain output  EXTREMITIES: normal strength, no deformities    -------------------------------------------------------------------------------------  LABS      --------------------------------------------------------------------------------------

## 2024-08-31 NOTE — PROGRESS NOTE ADULT - ASSESSMENT
59M with HTN, HLD, unspecified cognitive disorder. POD2 s/p I&D sublingual abscess, progressing well     PLAN:  - Rec weaning off sedation and extubation   - Rec PO full liquid diet   - Multimodal pain management  - Cont. IV Unasyn

## 2024-08-31 NOTE — PROGRESS NOTE ADULT - ATTENDING COMMENTS
I agree with the detailed interval history, physical, and plan, which I have reviewed and edited where appropriate'; also agree with notes/assessment with my team on service.  I have personally examined the patient.  I was physically present for the key portions of the evaluation and management (E/M) service provided.  I reviewed all the pertinent data.  The patient is a critical care patient with life threatening hemodynamic and metabolic instability in SICU.  The SICU team has a constant risk benefit analyzes discussion and coordinating care with the primary team and all consultants.   The patient is in SICU with the chief complaint and diagnosis mentioned in the note.   The plan will be specified in the note.  59M with unspecified cognitive disorder; sp I&D with worsening sublingual abscess in acute respiratory insufficiency in SICU with critical airway and edema.  Sedated  Lung clear  Heart RR  Abd soft  PLAN  Neuro  - propofol  -precedex  - tylenol  -Dilaudid   Resp  - AC  -ABG  Card  - MAP >65  GI  - NPO  -NGT    - IVF  @100  Heme  -lLvenox  - SCD  ID  - Unasyn   Disposition  - SICU

## 2024-08-31 NOTE — PROGRESS NOTE ADULT - SUBJECTIVE AND OBJECTIVE BOX
SICU Daily Progress Note  =====================================================  Interval/Overnight Events: NAOE    MEDICATIONS:   --------------------------------------------------------------------------------------  ampicillin/sulbactam  IVPB      ampicillin/sulbactam  IVPB 3 Gram(s) IV Intermittent every 6 hours  atorvastatin 10 milliGRAM(s) Oral with breakfast  chlorhexidine 0.12% Liquid 15 milliLiter(s) Oral Mucosa every 12 hours  chlorhexidine 2% Cloths 1 Application(s) Topical daily  citalopram 30 milliGRAM(s) Oral with breakfast  dexAMETHasone  IVPB 10 milliGRAM(s) IV Intermittent every 6 hours  dextrose 5% + sodium chloride 0.9% with potassium chloride 20 mEq/L 1000 milliLiter(s) IV Continuous <Continuous>  enoxaparin Injectable 40 milliGRAM(s) SubCutaneous every 24 hours  fentaNYL   Infusion. 0.5 MICROgram(s)/kG/Hr IV Continuous <Continuous>  lamoTRIgine 100 milliGRAM(s) Oral at bedtime  lithium 600 milliGRAM(s) Oral at bedtime  methylphenidate ER (CONCERTA) 72 milliGRAM(s) Oral every morning  metoprolol succinate ER 25 milliGRAM(s) Oral with breakfast  oxyCODONE    IR 5 milliGRAM(s) Oral every 4 hours PRN  oxyCODONE    IR 10 milliGRAM(s) Oral every 4 hours PRN  propofol Infusion 40.029 MICROgram(s)/kG/Min IV Continuous <Continuous>  QUEtiapine 100 milliGRAM(s) Oral at bedtime    --------------------------------------------------------------------------------------    VITAL SIGNS, INS/OUTS (last 24 hours):  --------------------------------------------------------------------------------------  T(C): 36.8 (08-31-24 @ 00:00), Max: 37.4 (08-30-24 @ 04:00)  HR: 47 (08-31-24 @ 00:00) (41 - 56)  BP: 110/69 (08-31-24 @ 00:00) (101/66 - 147/82)  RR: 14 (08-31-24 @ 00:00) (12 - 14)  SpO2: 100% (08-31-24 @ 00:00) (99% - 100%)  Mode: AC/ CMV (Assist Control/ Continuous Mandatory Ventilation), RR (machine): 14, TV (machine): 350, FiO2: 30, PEEP: 5, MAP: 10, PIP: 19    08-29-24 @ 07:01  -  08-30-24 @ 07:00  --------------------------------------------------------  IN: 2542.9 mL / OUT: 1930 mL / NET: 612.9 mL    08-30-24 @ 07:01  -  08-31-24 @ 00:33  --------------------------------------------------------  IN: 1990.4 mL / OUT: 795 mL / NET: 1195.4 mL      --------------------------------------------------------------------------------------    EXAM  NEURO: NAD, sedated  HEENT: NC/AT   RESPIRATORY: intubated on mechanical ventilation: 14/350/5/30  CARDIO: RRR  ABDOMEN: soft, nontender, nondistended  EXTREMITIES: normal strength, no deformities    TUBES/LINES/DRAINS  [x] Peripheral IV  [] Central Venous Line     	[] R	[] L	[] IJ	[] Fem	[] SC	Date Placed:   [] Arterial Line		[] R	[] L	[] Fem	[] Rad	[] Ax	Date Placed:   [] PICC		[] Midline		[] Mediport  [] Urinary Catheter		Date Placed:     LABS  --------------------------------------------------------------------------------------    --------------------------------------------------------------------------------------    IMAGING STUDIES:      SICU Daily Progress Note  =====================================================  Interval/Overnight Events: NAOE  - repeat cuff leak test 8/31: failure 1%  - H/H drop 8.7 -> 7.3, repeat CBC at 6am-->7.9    MEDICATIONS:   --------------------------------------------------------------------------------------  ampicillin/sulbactam  IVPB      ampicillin/sulbactam  IVPB 3 Gram(s) IV Intermittent every 6 hours  atorvastatin 10 milliGRAM(s) Oral with breakfast  chlorhexidine 0.12% Liquid 15 milliLiter(s) Oral Mucosa every 12 hours  chlorhexidine 2% Cloths 1 Application(s) Topical daily  citalopram 30 milliGRAM(s) Oral with breakfast  dexAMETHasone  IVPB 10 milliGRAM(s) IV Intermittent every 6 hours  dextrose 5% + sodium chloride 0.9% with potassium chloride 20 mEq/L 1000 milliLiter(s) IV Continuous <Continuous>  enoxaparin Injectable 40 milliGRAM(s) SubCutaneous every 24 hours  fentaNYL   Infusion. 0.5 MICROgram(s)/kG/Hr IV Continuous <Continuous>  lamoTRIgine 100 milliGRAM(s) Oral at bedtime  lithium 600 milliGRAM(s) Oral at bedtime  methylphenidate ER (CONCERTA) 72 milliGRAM(s) Oral every morning  metoprolol succinate ER 25 milliGRAM(s) Oral with breakfast  oxyCODONE    IR 5 milliGRAM(s) Oral every 4 hours PRN  oxyCODONE    IR 10 milliGRAM(s) Oral every 4 hours PRN  propofol Infusion 40.029 MICROgram(s)/kG/Min IV Continuous <Continuous>  QUEtiapine 100 milliGRAM(s) Oral at bedtime    --------------------------------------------------------------------------------------    VITAL SIGNS, INS/OUTS (last 24 hours):  --------------------------------------------------------------------------------------  T(C): 36.8 (08-31-24 @ 00:00), Max: 37.4 (08-30-24 @ 04:00)  HR: 47 (08-31-24 @ 00:00) (41 - 56)  BP: 110/69 (08-31-24 @ 00:00) (101/66 - 147/82)  RR: 14 (08-31-24 @ 00:00) (12 - 14)  SpO2: 100% (08-31-24 @ 00:00) (99% - 100%)  Mode: AC/ CMV (Assist Control/ Continuous Mandatory Ventilation), RR (machine): 14, TV (machine): 350, FiO2: 30, PEEP: 5, MAP: 10, PIP: 19    08-29-24 @ 07:01  -  08-30-24 @ 07:00  --------------------------------------------------------  IN: 2542.9 mL / OUT: 1930 mL / NET: 612.9 mL    08-30-24 @ 07:01  -  08-31-24 @ 00:33  --------------------------------------------------------  IN: 1990.4 mL / OUT: 795 mL / NET: 1195.4 mL      --------------------------------------------------------------------------------------    EXAM  NEURO: NAD, sedated  HEENT: NC/AT   RESPIRATORY: intubated on mechanical ventilation: 14/350/5/30  CARDIO: RRR  ABDOMEN: soft, nontender, nondistended  EXTREMITIES: normal strength, no deformities    TUBES/LINES/DRAINS  [x] Peripheral IV  [] Central Venous Line     	[] R	[] L	[] IJ	[] Fem	[] SC	Date Placed:   [] Arterial Line		[] R	[] L	[] Fem	[] Rad	[] Ax	Date Placed:   [] PICC		[] Midline		[] Mediport  [] Urinary Catheter		Date Placed:     LABS  --------------------------------------------------------------------------------------    --------------------------------------------------------------------------------------    IMAGING STUDIES:

## 2024-08-31 NOTE — PROGRESS NOTE ADULT - ATTENDING COMMENTS
I agree with the detailed interval history, physical, and plan, which I have reviewed and edited where appropriate'; also agree with notes/assessment with my team on service.  I have personally examined the patient.  I was physically present for the key portions of the evaluation and management (E/M) service provided.  I reviewed all the pertinent data.  The patient is a critical care patient with life threatening hemodynamic and metabolic instability in SICU.  The SICU team has a constant risk benefit analyzes discussion and coordinating care with the primary team and all consultants.   The patient is in SICU with the chief complaint and diagnosis mentioned in the note.   The plan will be specified in the note.  59M with unspecified cognitive disorder; sp I&D with worsening sublingual abscess in acute respiratory insufficiency in SICU with critical airway and edema.  No cuff leak.  Sedated  Lung clear  Heart RR  Abd soft  PLAN  Neuro  - propofol  -precedex  - tylenol  -Dilaudid   Resp  - AC  -ABG  Card  - MAP >65  GI  - NPO  -NGT    - IVF  @100  Heme  -Lovenox  - SCD  -Decadron  ID  - Unasyn   Disposition  - SICU

## 2024-08-31 NOTE — PROGRESS NOTE ADULT - SUBJECTIVE AND OBJECTIVE BOX
59M with HTN, HLD, unspecified cognitive disorder. POD2 s/p I&D sublingual abscess     Interval: NAEON, failed leak test 1%  O/E:  Gen: intubated on mechanical ventilation, sedated on prop and fent, NAD  H&N: mild lower facial soft swelling improved, no hematoma, no purulence discharge from the magallon drain   IO: FOM induration soft swelling improved, no signs of hematoma    ICU Vital Signs Last 24 Hrs  T(C): 37.3 (31 Aug 2024 08:00), Max: 37.3 (31 Aug 2024 08:00)  T(F): 99.2 (31 Aug 2024 08:00), Max: 99.2 (31 Aug 2024 08:00)  HR: 50 (31 Aug 2024 08:00) (43 - 56)  BP: 111/68 (31 Aug 2024 08:00) (101/66 - 147/82)  BP(mean): 81 (31 Aug 2024 08:00) (75 - 100)  ABP: --  ABP(mean): --  RR: 14 (31 Aug 2024 08:00) (12 - 14)  SpO2: 98% (31 Aug 2024 08:00) (97% - 100%)    O2 Parameters below as of 31 Aug 2024 08:00  Patient On (Oxygen Delivery Method): ventilator    O2 Concentration (%): 30    MEDICATIONS  (STANDING):  ampicillin/sulbactam  IVPB      ampicillin/sulbactam  IVPB 3 Gram(s) IV Intermittent every 6 hours  atorvastatin 10 milliGRAM(s) Oral with breakfast  chlorhexidine 0.12% Liquid 15 milliLiter(s) Oral Mucosa every 12 hours  chlorhexidine 2% Cloths 1 Application(s) Topical daily  citalopram 30 milliGRAM(s) Oral with breakfast  dexAMETHasone  IVPB 10 milliGRAM(s) IV Intermittent every 6 hours  dextrose 5% + sodium chloride 0.9% with potassium chloride 20 mEq/L 1000 milliLiter(s) (100 mL/Hr) IV Continuous <Continuous>  enoxaparin Injectable 40 milliGRAM(s) SubCutaneous every 24 hours  fentaNYL   Infusion. 0.5 MICROgram(s)/kG/Hr (2.29 mL/Hr) IV Continuous <Continuous>  lamoTRIgine 100 milliGRAM(s) Oral at bedtime  lithium 600 milliGRAM(s) Oral at bedtime  methylphenidate ER (CONCERTA) 72 milliGRAM(s) Oral every morning  metoprolol succinate ER 25 milliGRAM(s) Oral with breakfast  propofol Infusion 40.029 MICROgram(s)/kG/Min (11 mL/Hr) IV Continuous <Continuous>  QUEtiapine 100 milliGRAM(s) Oral at bedtime    MEDICATIONS  (PRN):  oxyCODONE    IR 5 milliGRAM(s) Oral every 4 hours PRN Moderate Pain (4 - 6)  oxyCODONE    IR 10 milliGRAM(s) Oral every 4 hours PRN Severe Pain (7 - 10)      Labs:                    7.9    16.58 )-----------( 390      ( 31 Aug 2024 05:44 )             24.8   08-31    144  |  115<H>  |  11  ----------------------------<  149<H>  4.9   |  20<L>  |  0.85    Ca    9.3      31 Aug 2024 01:00  Phos  2.7     08-31  Mg     2.30     08-31      I&O's Detail    30 Aug 2024 07:01  -  31 Aug 2024 07:00  --------------------------------------------------------  IN:    dextrose 5% + sodium chloride 0.9% w/ Additives: 2300 mL    FentaNYL: 252.5 mL    Glucerna 1.5: 328 mL    IV PiggyBack: 300 mL    Propofol: 20 mL    Propofol: 138 mL  Total IN: 3338.5 mL    OUT:    Dexmedetomidine: 0 mL    Indwelling Catheter - Urethral (mL): 1295 mL  Total OUT: 1295 mL    Total NET: 2043.5 mL      31 Aug 2024 07:01  -  31 Aug 2024 08:36  --------------------------------------------------------  IN:    dextrose 5% + sodium chloride 0.9% w/ Additives: 100 mL    FentaNYL: 13.7 mL    Glucerna 1.5: 32 mL    Propofol: 4.1 mL  Total IN: 149.8 mL    OUT:    Indwelling Catheter - Urethral (mL): 45 mL  Total OUT: 45 mL    Total NET: 104.8 mL

## 2024-08-31 NOTE — PROGRESS NOTE ADULT - ASSESSMENT
59M with HTN, HLD, unspecified cognitive disorder. Transferred from [facility] with worsening sublingual swelling. Pt seen at [facility] on Aug 26th for the same complaint, bedside I&D of sublingual space done with IV Unasyn. Pt discharged the following day with PO Augmentin, however pt denied taking his PO abx after discharge. Pt unable to eat nor drink much because of the pain and difficulty swallowing and returning to hospital. Patient received OR I&D of sublingual area and experienced considerable swelling in the airway, transferred to SICU intubated to protect airway and vent management.    Neuro  - Sedated: prop, fent  - Pain control: tylenol  - resume home psych meds via KO tube - citalopram, lamictal, lithium, methylphenidate (on hold d/t cant crush)   - QTc 415     Resp  - Intubated 14/350/5/30  - sublingual edema v hematoma.   - 8/31 failed leak test 1%   - decadron 10mg q6    Card  - MAP >65  -c/w home statin 10 QD  -c/w metoprolol with hold parameters    GI  - Diet: NPO w/ TF  - KO (8/30)      - monitor urine output  - IV fluids: D5 NS w/ K  @100    Heme  - DVT ppx: lovenox & SCD    ID  - Unasyn (8/28-  - Bcx from [facility]: streptococcus sensitive to pcn    Endocrine  - Monitor glucose    LINES/TUBES/DRAINS  - ET tube  - A-line  - KO tube    Disposition: SICU

## 2024-08-31 NOTE — PROGRESS NOTE ADULT - ASSESSMENT
59M with HTN, HLD, unspecified cognitive disorder. Transferred from [facility] with worsening sublingual swelling. Pt seen at [facility] on Aug 26th for the same complaint, bedside I&D of sublingual space done with IV Unasyn. Pt discharged the following day with PO Augmentin, however pt denied taking his PO abx after discharge. Pt unable to eat nor drink much because of the pain and difficulty swallowing and returning to hospital. Patient received OR I&D of sublingual area and experienced considerable swelling in the airway, transferred to SICU intubated to protect airway and vent management.    Neuro  - Sedated: prop, fent  - Pain control: tylenol  - resume home psych meds via KO tube - citalopram, lamictal, lithium, methylphenidate (on hold d/t cant crush)   - check lithium levels  - QTc 415     Resp  - Intubated 14/350/5/30  - sublingual edema v hematoma.   - 8/30 failed leaked test 1%   - decadron 10mg q6    Card  - MAP >65  - resume home statin 10 QD    GI  - Diet: NPO  - KO (8/30)      - monitor urine output  - IV fluids: D5 NS w/ K  @100    Heme  - DVT ppx: lovenox & SCD    ID  - Unasyn (8/28-  - Bcx from [facility]: streptococcus sensitive to pcn    Endocrine  - Monitor glucose    LINES/TUBES/DRAINS  - ET tube  - A-line  - KO tube    Disposition: SICU 59M with HTN, HLD, unspecified cognitive disorder. Transferred from [facility] with worsening sublingual swelling. Pt seen at [facility] on Aug 26th for the same complaint, bedside I&D of sublingual space done with IV Unasyn. Pt discharged the following day with PO Augmentin, however pt denied taking his PO abx after discharge. Pt unable to eat nor drink much because of the pain and difficulty swallowing and returning to hospital. Patient received OR I&D of sublingual area and experienced considerable swelling in the airway, transferred to SICU intubated to protect airway and vent management.    Neuro  - Sedated: prop, fent  - Pain control: tylenol  - resume home psych meds via KO tube - citalopram, lamictal, lithium, methylphenidate (on hold d/t cant crush)   - QTc 415     Resp  - Intubated 14/350/5/30  - sublingual edema v hematoma.   - 8/31 failed leaked test 1%   - decadron 10mg q6    Card  - MAP >65  -c/w home statin 10 QD  -c/w metoprolol with hold parameters    GI  - Diet: NPO w/ TF  - KO (8/30)      - monitor urine output  - IV fluids: D5 NS w/ K  @100    Heme  - DVT ppx: lovenox & SCD    ID  - Unasyn (8/28-  - Bcx from [facility]: streptococcus sensitive to pcn    Endocrine  - Monitor glucose    LINES/TUBES/DRAINS  - ET tube  - A-line  - KO tube    Disposition: SICU 59M with HTN, HLD, unspecified cognitive disorder. Transferred from [facility] with worsening sublingual swelling. Pt seen at [facility] on Aug 26th for the same complaint, bedside I&D of sublingual space done with IV Unasyn. Pt discharged the following day with PO Augmentin, however pt denied taking his PO abx after discharge. Pt unable to eat nor drink much because of the pain and difficulty swallowing and returning to hospital. Patient received OR I&D of sublingual area and experienced considerable swelling in the airway, transferred to SICU intubated to protect airway and vent management.    Neuro  - Sedated: prop, fent  - Pain control: tylenol  - resume home psych meds via KO tube - citalopram, lamictal, lithium, methylphenidate (on hold d/t cant crush)   - QTc 415     Resp  - Intubated 14/350/5/30  - sublingual edema v hematoma.   - 8/31 failed leak test 1%   - decadron 10mg q6    Card  - MAP >65  -c/w home statin 10 QD  -c/w metoprolol with hold parameters    GI  - Diet: NPO w/ TF  - KO (8/30)      - monitor urine output  - IV fluids: D5 NS w/ K  @100    Heme  - DVT ppx: lovenox & SCD    ID  - Unasyn (8/28-  - Bcx from [facility]: streptococcus sensitive to pcn    Endocrine  - Monitor glucose    LINES/TUBES/DRAINS  - ET tube  - A-line  - KO tube    Disposition: SICU

## 2024-09-01 LAB
ADD ON TEST-SPECIMEN IN LAB: SIGNIFICANT CHANGE UP
ANION GAP SERPL CALC-SCNC: 9 MMOL/L — SIGNIFICANT CHANGE UP (ref 7–14)
BASE EXCESS BLDA CALC-SCNC: -0.4 MMOL/L — SIGNIFICANT CHANGE UP (ref -2–3)
BASOPHILS # BLD AUTO: 0.01 K/UL — SIGNIFICANT CHANGE UP (ref 0–0.2)
BASOPHILS NFR BLD AUTO: 0.1 % — SIGNIFICANT CHANGE UP (ref 0–2)
BUN SERPL-MCNC: 21 MG/DL — SIGNIFICANT CHANGE UP (ref 7–23)
CALCIUM SERPL-MCNC: 9.4 MG/DL — SIGNIFICANT CHANGE UP (ref 8.4–10.5)
CHLORIDE SERPL-SCNC: 113 MMOL/L — HIGH (ref 98–107)
CO2 BLDA-SCNC: 26 MMOL/L — HIGH (ref 19–24)
CO2 SERPL-SCNC: 22 MMOL/L — SIGNIFICANT CHANGE UP (ref 22–31)
CREAT SERPL-MCNC: 0.93 MG/DL — SIGNIFICANT CHANGE UP (ref 0.5–1.3)
EGFR: 93 ML/MIN/1.73M2 — SIGNIFICANT CHANGE UP
EOSINOPHIL # BLD AUTO: 0 K/UL — SIGNIFICANT CHANGE UP (ref 0–0.5)
EOSINOPHIL NFR BLD AUTO: 0 % — SIGNIFICANT CHANGE UP (ref 0–6)
GLUCOSE SERPL-MCNC: 127 MG/DL — HIGH (ref 70–99)
HCO3 BLDA-SCNC: 25 MMOL/L — SIGNIFICANT CHANGE UP (ref 21–28)
HCT VFR BLD CALC: 24.2 % — LOW (ref 39–50)
HGB BLD-MCNC: 7.6 G/DL — LOW (ref 13–17)
HOROWITZ INDEX BLDA+IHG-RTO: 30 — SIGNIFICANT CHANGE UP
IANC: 15.88 K/UL — HIGH (ref 1.8–7.4)
IMM GRANULOCYTES NFR BLD AUTO: 1.3 % — HIGH (ref 0–0.9)
LYMPHOCYTES # BLD AUTO: 0.77 K/UL — LOW (ref 1–3.3)
LYMPHOCYTES # BLD AUTO: 4.3 % — LOW (ref 13–44)
MAGNESIUM SERPL-MCNC: 2.4 MG/DL — SIGNIFICANT CHANGE UP (ref 1.6–2.6)
MCHC RBC-ENTMCNC: 31.4 GM/DL — LOW (ref 32–36)
MCHC RBC-ENTMCNC: 37.3 PG — HIGH (ref 27–34)
MCV RBC AUTO: 118.6 FL — HIGH (ref 80–100)
MONOCYTES # BLD AUTO: 1.21 K/UL — HIGH (ref 0–0.9)
MONOCYTES NFR BLD AUTO: 6.7 % — SIGNIFICANT CHANGE UP (ref 2–14)
NEUTROPHILS # BLD AUTO: 15.88 K/UL — HIGH (ref 1.8–7.4)
NEUTROPHILS NFR BLD AUTO: 87.6 % — HIGH (ref 43–77)
NRBC # BLD: 0 /100 WBCS — SIGNIFICANT CHANGE UP (ref 0–0)
NRBC # FLD: 0 K/UL — SIGNIFICANT CHANGE UP (ref 0–0)
PCO2 BLDA: 42 MMHG — SIGNIFICANT CHANGE UP (ref 35–48)
PH BLDA: 7.38 — SIGNIFICANT CHANGE UP (ref 7.35–7.45)
PHOSPHATE SERPL-MCNC: 2.6 MG/DL — SIGNIFICANT CHANGE UP (ref 2.5–4.5)
PLATELET # BLD AUTO: 426 K/UL — HIGH (ref 150–400)
PO2 BLDA: 99 MMHG — SIGNIFICANT CHANGE UP (ref 83–108)
POTASSIUM SERPL-MCNC: 4.6 MMOL/L — SIGNIFICANT CHANGE UP (ref 3.5–5.3)
POTASSIUM SERPL-SCNC: 4.6 MMOL/L — SIGNIFICANT CHANGE UP (ref 3.5–5.3)
RBC # BLD: 2.04 M/UL — LOW (ref 4.2–5.8)
RBC # FLD: 13.2 % — SIGNIFICANT CHANGE UP (ref 10.3–14.5)
SAO2 % BLDA: 98.4 % — HIGH (ref 94–98)
SODIUM SERPL-SCNC: 144 MMOL/L — SIGNIFICANT CHANGE UP (ref 135–145)
WBC # BLD: 18.32 K/UL — HIGH (ref 3.8–10.5)
WBC # FLD AUTO: 18.32 K/UL — HIGH (ref 3.8–10.5)

## 2024-09-01 PROCEDURE — 99292 CRITICAL CARE ADDL 30 MIN: CPT

## 2024-09-01 PROCEDURE — 71045 X-RAY EXAM CHEST 1 VIEW: CPT | Mod: 26

## 2024-09-01 PROCEDURE — 99291 CRITICAL CARE FIRST HOUR: CPT

## 2024-09-01 RX ORDER — PIPERACILLIN SODIUM AND TAZOBACTAM SODIUM 3; .375 G/15ML; G/15ML
3.38 INJECTION, POWDER, FOR SOLUTION INTRAVENOUS ONCE
Refills: 0 | Status: COMPLETED | OUTPATIENT
Start: 2024-09-01 | End: 2024-09-01

## 2024-09-01 RX ORDER — ACETAMINOPHEN 325 MG/1
650 TABLET ORAL EVERY 6 HOURS
Refills: 0 | Status: DISCONTINUED | OUTPATIENT
Start: 2024-09-01 | End: 2024-09-03

## 2024-09-01 RX ORDER — SODIUM PHOSPHATE, DIBASIC, ANHYDROUS, POTASSIUM PHOSPHATE, MONOBASIC, AND SODIUM PHOSPHATE, MONOBASIC, MONOHYDRATE 852; 155; 130 MG/1; MG/1; MG/1
1 TABLET, COATED ORAL ONCE
Refills: 0 | Status: COMPLETED | OUTPATIENT
Start: 2024-09-01 | End: 2024-09-01

## 2024-09-01 RX ORDER — ACETAMINOPHEN 325 MG/1
650 TABLET ORAL EVERY 6 HOURS
Refills: 0 | Status: DISCONTINUED | OUTPATIENT
Start: 2024-09-01 | End: 2024-09-01

## 2024-09-01 RX ORDER — PIPERACILLIN SODIUM AND TAZOBACTAM SODIUM 3; .375 G/15ML; G/15ML
3.38 INJECTION, POWDER, FOR SOLUTION INTRAVENOUS EVERY 8 HOURS
Refills: 0 | Status: COMPLETED | OUTPATIENT
Start: 2024-09-01 | End: 2024-09-08

## 2024-09-01 RX ADMIN — Medication 30 MILLIGRAM(S): at 12:13

## 2024-09-01 RX ADMIN — CHLORHEXIDINE GLUCONATE 15 MILLILITER(S): 40 SOLUTION TOPICAL at 18:36

## 2024-09-01 RX ADMIN — ENOXAPARIN SODIUM 40 MILLIGRAM(S): 100 INJECTION SUBCUTANEOUS at 18:36

## 2024-09-01 RX ADMIN — Medication 100 MILLIGRAM(S): at 21:22

## 2024-09-01 RX ADMIN — LITHIUM CARBONATE 600 MILLIGRAM(S): 150 CAPSULE ORAL at 21:21

## 2024-09-01 RX ADMIN — FENTANYL CITRATE 2.29 MICROGRAM(S)/KG/HR: 50 INJECTION INTRAMUSCULAR; INTRAVENOUS at 20:37

## 2024-09-01 RX ADMIN — PIPERACILLIN SODIUM AND TAZOBACTAM SODIUM 200 GRAM(S): 3; .375 INJECTION, POWDER, FOR SOLUTION INTRAVENOUS at 09:00

## 2024-09-01 RX ADMIN — CHLORHEXIDINE GLUCONATE 15 MILLILITER(S): 40 SOLUTION TOPICAL at 05:24

## 2024-09-01 RX ADMIN — PROPOFOL 11 MICROGRAM(S)/KG/MIN: 10 INJECTION, EMULSION INTRAVENOUS at 05:25

## 2024-09-01 RX ADMIN — ACETAMINOPHEN 650 MILLIGRAM(S): 325 TABLET ORAL at 05:35

## 2024-09-01 RX ADMIN — CHLORHEXIDINE GLUCONATE 1 APPLICATION(S): 40 SOLUTION TOPICAL at 12:13

## 2024-09-01 RX ADMIN — PIPERACILLIN SODIUM AND TAZOBACTAM SODIUM 25 GRAM(S): 3; .375 INJECTION, POWDER, FOR SOLUTION INTRAVENOUS at 21:26

## 2024-09-01 RX ADMIN — Medication 102 MILLIGRAM(S): at 18:36

## 2024-09-01 RX ADMIN — Medication 102 MILLIGRAM(S): at 05:35

## 2024-09-01 RX ADMIN — ACETAMINOPHEN 650 MILLIGRAM(S): 325 TABLET ORAL at 23:01

## 2024-09-01 RX ADMIN — Medication 10 MILLIGRAM(S): at 12:12

## 2024-09-01 RX ADMIN — Medication 102 MILLIGRAM(S): at 23:01

## 2024-09-01 RX ADMIN — PROPOFOL 11 MICROGRAM(S)/KG/MIN: 10 INJECTION, EMULSION INTRAVENOUS at 19:16

## 2024-09-01 RX ADMIN — FENTANYL CITRATE 2.29 MICROGRAM(S)/KG/HR: 50 INJECTION INTRAMUSCULAR; INTRAVENOUS at 19:16

## 2024-09-01 RX ADMIN — AMPICILLIN SODIUM AND SULBACTAM SODIUM 200 GRAM(S): 1; .5 INJECTION, POWDER, FOR SOLUTION INTRAMUSCULAR; INTRAVENOUS at 06:05

## 2024-09-01 RX ADMIN — SODIUM PHOSPHATE, DIBASIC, ANHYDROUS, POTASSIUM PHOSPHATE, MONOBASIC, AND SODIUM PHOSPHATE, MONOBASIC, MONOHYDRATE 1 PACKET(S): 852; 155; 130 TABLET, COATED ORAL at 05:24

## 2024-09-01 RX ADMIN — Medication 102 MILLIGRAM(S): at 12:12

## 2024-09-01 RX ADMIN — LAMOTRIGINE 100 MILLIGRAM(S): 100 TABLET, EXTENDED RELEASE ORAL at 21:22

## 2024-09-01 RX ADMIN — ACETAMINOPHEN 650 MILLIGRAM(S): 325 TABLET ORAL at 06:05

## 2024-09-01 RX ADMIN — PIPERACILLIN SODIUM AND TAZOBACTAM SODIUM 25 GRAM(S): 3; .375 INJECTION, POWDER, FOR SOLUTION INTRAVENOUS at 12:12

## 2024-09-01 NOTE — PROGRESS NOTE ADULT - ATTENDING COMMENTS
I agree with the detailed interval history, physical, and plan, which I have reviewed and edited where appropriate'; also agree with notes/assessment with my team on service.  I have personally examined the patient.  I was physically present for the key portions of the evaluation and management (E/M) service provided.  I reviewed all the pertinent data.  The patient is a critical care patient with life threatening hemodynamic and metabolic instability in SICU.  The SICU team has a constant risk benefit analyzes discussion and coordinating care with the primary team and all consultants.   The patient is in SICU with the chief complaint and diagnosis mentioned in the note.   The plan will be specified in the note.  59M with unspecified cognitive disorder; sp I&D with worsening sublingual abscess in acute respiratory insufficiency in SICU with critical airway and edema.  No cuff leak.  Sedated  Lung clear  Heart RR  Abd soft  PLAN  Neuro  - propofol  -precedex  - tylenol  -Dilaudid   Resp  - AC  -ABG  Card  - MAP >65  -POCUs  GI  - NPO  -NGT    - IVF  @100  Heme  -Lovenox  - SCD  -Decadron  ID  - Unasyn   Disposition  - SICU

## 2024-09-01 NOTE — PROGRESS NOTE ADULT - ASSESSMENT
59M with HTN, HLD, unspecified cognitive disorder. POD3 s/p I&D sublingual abscess, progressing well in SICU. Improved swelling and FOM soft, non tender.    PLAN:  - continue tube feeds  - Multimodal pain management  - Cont. IV Unasyn  - Extubation per SICU requirements    Cindi Wylie  Oral and Maxillofacial Surgery  [facility] OMFS Pager #[phone]  Available on Teams

## 2024-09-01 NOTE — PROGRESS NOTE ADULT - SUBJECTIVE AND OBJECTIVE BOX
SICU Daily Progress Note  =====================================================  Interval/Overnight Events:       - repeat cuff leak test 8/31: failure 1, 5, 3% on multiple attempts  - Abx switchd from unasyn to zosyn   - Crocker to condom cath      ALLERGIES:  No Known Allergies      --------------------------------------------------------------------------------------    MEDICATIONS:    Neurologic Medications  citalopram 30 milliGRAM(s) Oral with breakfast  fentaNYL   Infusion. 0.5 MICROgram(s)/kG/Hr IV Continuous <Continuous>  lamoTRIgine 100 milliGRAM(s) Oral at bedtime  lithium 600 milliGRAM(s) Oral at bedtime  methylphenidate ER (CONCERTA) 72 milliGRAM(s) Oral every morning  propofol Infusion 40.029 MICROgram(s)/kG/Min IV Continuous <Continuous>  QUEtiapine 100 milliGRAM(s) Oral at bedtime    Respiratory Medications    Cardiovascular Medications  metoprolol tartrate Injectable 2.5 milliGRAM(s) IV Push every 6 hours    Gastrointestinal Medications  dextrose 5% + sodium chloride 0.9% with potassium chloride 20 mEq/L 1000 milliLiter(s) IV Continuous <Continuous>    Genitourinary Medications    Hematologic/Oncologic Medications  enoxaparin Injectable 40 milliGRAM(s) SubCutaneous every 24 hours    Antimicrobial/Immunologic Medications  ampicillin/sulbactam  IVPB      ampicillin/sulbactam  IVPB 3 Gram(s) IV Intermittent every 6 hours    Endocrine/Metabolic Medications  atorvastatin 10 milliGRAM(s) Oral with breakfast  dexAMETHasone  IVPB 10 milliGRAM(s) IV Intermittent every 6 hours    Topical/Other Medications  chlorhexidine 0.12% Liquid 15 milliLiter(s) Oral Mucosa every 12 hours  chlorhexidine 2% Cloths 1 Application(s) Topical daily    --------------------------------------------------------------------------------------    VITAL SIGNS:  T(C): 37.8 (08-31-24 @ 20:00), Max: 37.8 (08-31-24 @ 20:00)  HR: 60 (08-31-24 @ 23:50) (45 - 60)  BP: 137/82 (08-31-24 @ 23:00) (102/63 - 138/76)  RR: 13 (08-31-24 @ 23:00) (13 - 14)  SpO2: 99% (08-31-24 @ 23:50) (96% - 100%)  --------------------------------------------------------------------------------------    INS AND OUTS:    08-30-24 @ 07:01  -  08-31-24 @ 07:00  --------------------------------------------------------  IN: 3338.5 mL / OUT: 1295 mL / NET: 2043.5 mL    08-31-24 @ 07:01  -  09-01-24 @ 00:07  --------------------------------------------------------  IN: 2439.8 mL / OUT: 1085 mL / NET: 1354.8 mL      --------------------------------------------------------------------------------------    EXAM    NEURO: NAD, sedated  HEENT: NC/AT   RESPIRATORY: intubated on mechanical ventilation: 14/350/5/30  CARDIO: RRR  ABDOMEN: soft, nontender, nondistended  EXTREMITIES: normal strength, no deformities    --------------------------------------------------------------------------------------    LABS                          7.9    16.58 )-----------( 390      ( 31 Aug 2024 05:44 )             24.8     08-31    144  |  115<H>  |  11  ----------------------------<  149<H>  4.9   |  20<L>  |  0.85    Ca    9.3      31 Aug 2024 01:00  Phos  2.7     08-31  Mg     2.30     08-31      -------------------------------------------------------------------------------------- SICU PM Progress Note  =====================================================  Interval/Overnight Events:       - repeat cuff leak test 8/31: failure 1, 5, 3% on multiple attempts  - Abx switchd from unasyn to zosyn   - Crocker to condom cath      ALLERGIES:  No Known Allergies      --------------------------------------------------------------------------------------    MEDICATIONS:    Neurologic Medications  citalopram 30 milliGRAM(s) Oral with breakfast  fentaNYL   Infusion. 0.5 MICROgram(s)/kG/Hr IV Continuous <Continuous>  lamoTRIgine 100 milliGRAM(s) Oral at bedtime  lithium 600 milliGRAM(s) Oral at bedtime  methylphenidate ER (CONCERTA) 72 milliGRAM(s) Oral every morning  propofol Infusion 40.029 MICROgram(s)/kG/Min IV Continuous <Continuous>  QUEtiapine 100 milliGRAM(s) Oral at bedtime    Respiratory Medications    Cardiovascular Medications  metoprolol tartrate Injectable 2.5 milliGRAM(s) IV Push every 6 hours    Gastrointestinal Medications  dextrose 5% + sodium chloride 0.9% with potassium chloride 20 mEq/L 1000 milliLiter(s) IV Continuous <Continuous>    Genitourinary Medications    Hematologic/Oncologic Medications  enoxaparin Injectable 40 milliGRAM(s) SubCutaneous every 24 hours    Antimicrobial/Immunologic Medications  ampicillin/sulbactam  IVPB      ampicillin/sulbactam  IVPB 3 Gram(s) IV Intermittent every 6 hours    Endocrine/Metabolic Medications  atorvastatin 10 milliGRAM(s) Oral with breakfast  dexAMETHasone  IVPB 10 milliGRAM(s) IV Intermittent every 6 hours    Topical/Other Medications  chlorhexidine 0.12% Liquid 15 milliLiter(s) Oral Mucosa every 12 hours  chlorhexidine 2% Cloths 1 Application(s) Topical daily    --------------------------------------------------------------------------------------    VITAL SIGNS:  T(C): 37.8 (08-31-24 @ 20:00), Max: 37.8 (08-31-24 @ 20:00)  HR: 60 (08-31-24 @ 23:50) (45 - 60)  BP: 137/82 (08-31-24 @ 23:00) (102/63 - 138/76)  RR: 13 (08-31-24 @ 23:00) (13 - 14)  SpO2: 99% (08-31-24 @ 23:50) (96% - 100%)  --------------------------------------------------------------------------------------    INS AND OUTS:    08-30-24 @ 07:01  -  08-31-24 @ 07:00  --------------------------------------------------------  IN: 3338.5 mL / OUT: 1295 mL / NET: 2043.5 mL    08-31-24 @ 07:01  -  09-01-24 @ 00:07  --------------------------------------------------------  IN: 2439.8 mL / OUT: 1085 mL / NET: 1354.8 mL      --------------------------------------------------------------------------------------    EXAM    NEURO: NAD, sedated  HEENT: NC/AT   RESPIRATORY: intubated on mechanical ventilation: 14/350/5/30  CARDIO: RRR  ABDOMEN: soft, nontender, nondistended  EXTREMITIES: normal strength, no deformities    --------------------------------------------------------------------------------------    LABS                          7.9    16.58 )-----------( 390      ( 31 Aug 2024 05:44 )             24.8     08-31    144  |  115<H>  |  11  ----------------------------<  149<H>  4.9   |  20<L>  |  0.85    Ca    9.3      31 Aug 2024 01:00  Phos  2.7     08-31  Mg     2.30     08-31      -------------------------------------------------------------------------------------- SICU PM Progress Note  =====================================================  Interval/Overnight Events:       - repeat cuff leak test 8/31: failure 1, 5, 3% on multiple attempts  - Abx switched from Unasyn to zosyn   - Crocker to condom cath      ALLERGIES:  No Known Allergies      --------------------------------------------------------------------------------------    MEDICATIONS:    Neurologic Medications  citalopram 30 milliGRAM(s) Oral with breakfast  fentaNYL   Infusion. 0.5 MICROgram(s)/kG/Hr IV Continuous <Continuous>  lamoTRIgine 100 milliGRAM(s) Oral at bedtime  lithium 600 milliGRAM(s) Oral at bedtime  methylphenidate ER (CONCERTA) 72 milliGRAM(s) Oral every morning  propofol Infusion 40.029 MICROgram(s)/kG/Min IV Continuous <Continuous>  QUEtiapine 100 milliGRAM(s) Oral at bedtime    Respiratory Medications    Cardiovascular Medications  metoprolol tartrate Injectable 2.5 milliGRAM(s) IV Push every 6 hours    Gastrointestinal Medications  dextrose 5% + sodium chloride 0.9% with potassium chloride 20 mEq/L 1000 milliLiter(s) IV Continuous <Continuous>    Genitourinary Medications    Hematologic/Oncologic Medications  enoxaparin Injectable 40 milliGRAM(s) SubCutaneous every 24 hours    Antimicrobial/Immunologic Medications  ampicillin/sulbactam  IVPB      ampicillin/sulbactam  IVPB 3 Gram(s) IV Intermittent every 6 hours    Endocrine/Metabolic Medications  atorvastatin 10 milliGRAM(s) Oral with breakfast  dexAMETHasone  IVPB 10 milliGRAM(s) IV Intermittent every 6 hours    Topical/Other Medications  chlorhexidine 0.12% Liquid 15 milliLiter(s) Oral Mucosa every 12 hours  chlorhexidine 2% Cloths 1 Application(s) Topical daily    --------------------------------------------------------------------------------------    VITAL SIGNS:  T(C): 37.8 (08-31-24 @ 20:00), Max: 37.8 (08-31-24 @ 20:00)  HR: 60 (08-31-24 @ 23:50) (45 - 60)  BP: 137/82 (08-31-24 @ 23:00) (102/63 - 138/76)  RR: 13 (08-31-24 @ 23:00) (13 - 14)  SpO2: 99% (08-31-24 @ 23:50) (96% - 100%)  --------------------------------------------------------------------------------------    INS AND OUTS:    08-30-24 @ 07:01  -  08-31-24 @ 07:00  --------------------------------------------------------  IN: 3338.5 mL / OUT: 1295 mL / NET: 2043.5 mL    08-31-24 @ 07:01  -  09-01-24 @ 00:07  --------------------------------------------------------  IN: 2439.8 mL / OUT: 1085 mL / NET: 1354.8 mL      --------------------------------------------------------------------------------------    EXAM    NEURO: NAD, sedated  HEENT: NC/AT   RESPIRATORY: intubated on mechanical ventilation: 14/350/5/30  CARDIO: RRR  ABDOMEN: soft, nontender, nondistended  EXTREMITIES: normal strength, no deformities    --------------------------------------------------------------------------------------    LABS                          7.9    16.58 )-----------( 390      ( 31 Aug 2024 05:44 )             24.8     08-31    144  |  115<H>  |  11  ----------------------------<  149<H>  4.9   |  20<L>  |  0.85    Ca    9.3      31 Aug 2024 01:00  Phos  2.7     08-31  Mg     2.30     08-31      --------------------------------------------------------------------------------------

## 2024-09-01 NOTE — PROGRESS NOTE ADULT - ASSESSMENT
ASSESSMENT:  59M with HTN, HLD, unspecified cognitive disorder. Transferred from [facility] with worsening sublingual swelling. Pt seen at [facility] on Aug 26th for the same complaint, bedside I&D of sublingual space done with IV Unasyn. Pt discharged the following day with PO Augmentin, however pt denied taking his PO abx after discharge. Pt unable to eat nor drink much because of the pain and difficulty swallowing and returning to hospital. Patient received OR I&D of sublingual area and experienced considerable swelling in the airway, transferred to SICU intubated to protect airway and vent management.    PLAN:  Neuro  - Sedated: prop, fent  - Pain control: tylenol  - resume home psych meds via KO tube - citalopram, lamictal, lithium, methylphenidate (on hold d/t cant crush)   - QTc 415     Resp  - Intubated 14/350/5/30  - sublingual edema v hematoma.   - 8/31 failed leaked test 1%   - decadron 10mg q6    Card  - MAP >65  - home statin 10 QD  -  metoprolol 2.5 q6    GI  - Diet: NPO w/ TF via KO       - monitor urine output  - IV fluids: D5 NS w/ K  @50    Heme  - DVT ppx: lovenox & SCD    ID  - Unasyn (8/28-  - Bcx from [facility]: streptococcus sensitive to pcn    Endocrine  - Monitor glucose    LINES/TUBES/DRAINS  - ET tube  - A-line  - KO tube    Disposition: SICU

## 2024-09-01 NOTE — PROGRESS NOTE ADULT - SUBJECTIVE AND OBJECTIVE BOX
SICU Daily Progress Note  =====================================================  Interval/Overnight Events:       - repeat cuff leak test 8/31: failure 1%    ALLERGIES:  No Known Allergies      --------------------------------------------------------------------------------------    MEDICATIONS:    Neurologic Medications  citalopram 30 milliGRAM(s) Oral with breakfast  fentaNYL   Infusion. 0.5 MICROgram(s)/kG/Hr IV Continuous <Continuous>  lamoTRIgine 100 milliGRAM(s) Oral at bedtime  lithium 600 milliGRAM(s) Oral at bedtime  methylphenidate ER (CONCERTA) 72 milliGRAM(s) Oral every morning  propofol Infusion 40.029 MICROgram(s)/kG/Min IV Continuous <Continuous>  QUEtiapine 100 milliGRAM(s) Oral at bedtime    Respiratory Medications    Cardiovascular Medications  metoprolol tartrate Injectable 2.5 milliGRAM(s) IV Push every 6 hours    Gastrointestinal Medications  dextrose 5% + sodium chloride 0.9% with potassium chloride 20 mEq/L 1000 milliLiter(s) IV Continuous <Continuous>    Genitourinary Medications    Hematologic/Oncologic Medications  enoxaparin Injectable 40 milliGRAM(s) SubCutaneous every 24 hours    Antimicrobial/Immunologic Medications  ampicillin/sulbactam  IVPB      ampicillin/sulbactam  IVPB 3 Gram(s) IV Intermittent every 6 hours    Endocrine/Metabolic Medications  atorvastatin 10 milliGRAM(s) Oral with breakfast  dexAMETHasone  IVPB 10 milliGRAM(s) IV Intermittent every 6 hours    Topical/Other Medications  chlorhexidine 0.12% Liquid 15 milliLiter(s) Oral Mucosa every 12 hours  chlorhexidine 2% Cloths 1 Application(s) Topical daily    --------------------------------------------------------------------------------------    VITAL SIGNS:  T(C): 37.8 (08-31-24 @ 20:00), Max: 37.8 (08-31-24 @ 20:00)  HR: 60 (08-31-24 @ 23:50) (45 - 60)  BP: 137/82 (08-31-24 @ 23:00) (102/63 - 138/76)  RR: 13 (08-31-24 @ 23:00) (13 - 14)  SpO2: 99% (08-31-24 @ 23:50) (96% - 100%)  --------------------------------------------------------------------------------------    INS AND OUTS:    08-30-24 @ 07:01  -  08-31-24 @ 07:00  --------------------------------------------------------  IN: 3338.5 mL / OUT: 1295 mL / NET: 2043.5 mL    08-31-24 @ 07:01  -  09-01-24 @ 00:07  --------------------------------------------------------  IN: 2439.8 mL / OUT: 1085 mL / NET: 1354.8 mL      --------------------------------------------------------------------------------------    EXAM    NEURO: NAD, sedated  HEENT: NC/AT   RESPIRATORY: intubated on mechanical ventilation: 14/350/5/30  CARDIO: RRR  ABDOMEN: soft, nontender, nondistended  EXTREMITIES: normal strength, no deformities    --------------------------------------------------------------------------------------    LABS                          7.9    16.58 )-----------( 390      ( 31 Aug 2024 05:44 )             24.8     08-31    144  |  115<H>  |  11  ----------------------------<  149<H>  4.9   |  20<L>  |  0.85    Ca    9.3      31 Aug 2024 01:00  Phos  2.7     08-31  Mg     2.30     08-31      --------------------------------------------------------------------------------------

## 2024-09-01 NOTE — PROGRESS NOTE ADULT - ASSESSMENT
ASSESSMENT:  59M with HTN, HLD, unspecified cognitive disorder. Transferred from [facility] with worsening sublingual swelling. Pt seen at [facility] on Aug 26th for the same complaint, bedside I&D of sublingual space done with IV Unasyn. Pt discharged the following day with PO Augmentin, however pt denied taking his PO abx after discharge. Pt unable to eat nor drink much because of the pain and difficulty swallowing and returning to hospital. Patient received OR I&D of sublingual area and experienced considerable swelling in the airway, transferred to SICU intubated to protect airway and vent management.    PLAN:  Neuro  - Sedated: prop, fent  - Pain control: tylenol  - resume home psych meds via KO tube - citalopram, lamictal, lithium, methylphenidate (on hold d/t cant crush)   - QTc 415     Resp  - Intubated 14/350/5/30  - sublingual edema v hematoma.   - 8/31, 9/1 multiple failed leaked tests  - decadron 10mg q6    Card  - MAP >65  - home statin 10 QD  -  metoprolol 2.5 q6    GI  - Diet: NPO w/ TF via KO       - magallon removed 9/1  - monitor urine output  - IV fluids: D5 NS w/ K  @50    Heme  - DVT ppx: lovenox & SCD    ID  - Zosyn (9/1-  - Unasyn (8/28-9/1)  - Bcx from [facility]: streptococcus sensitive to pcn    Endocrine  - Monitor glucose    LINES/TUBES/DRAINS  - ET tube  - A-line  - KO tube    Disposition: SICU

## 2024-09-01 NOTE — PROGRESS NOTE ADULT - SUBJECTIVE AND OBJECTIVE BOX
59M with HTN, HLD, unspecified cognitive disorder. POD3 s/p I&D sublingual abscess.    Interval: NAEON, failed leak test >1%    General: WD, thin, intubated on mechanical ventilation, sedated on prop and fent, NAD  Neuro: AAOx3, CN II-XII grossly intact  Extra oral exam: inferior border of the mandible fully palpable.  Intra oral exam: uvula midline,   H&N: mild lower facial soft swelling improved, no hematoma, no purulence discharge from the magallon drain   IO: FOM induration soft swelling improved, no signs of hematoma    Vitals:     Vital Signs Last 24 Hrs  T(C): 38 (01 Sep 2024 04:00), Max: 38 (01 Sep 2024 04:00)  T(F): 100.4 (01 Sep 2024 04:00), Max: 100.4 (01 Sep 2024 04:00)  HR: 67 (01 Sep 2024 07:55) (50 - 67)  BP: 134/79 (01 Sep 2024 07:00) (115/70 - 137/82)  BP(mean): 94 (01 Sep 2024 07:00) (84 - 100)  RR: 13 (01 Sep 2024 07:00) (13 - 14)  SpO2: 99% (01 Sep 2024 07:55) (96% - 100%)    Parameters below as of 01 Sep 2024 07:00  Patient On (Oxygen Delivery Method): ventilator, A/C    O2 Concentration (%): 30    I&O's Detail    31 Aug 2024 07:01  -  01 Sep 2024 07:00  --------------------------------------------------------  IN:    dextrose 5% + sodium chloride 0.9% w/ Additives: 1450 mL    FentaNYL: 295.8 mL    Glucerna 1.5: 768 mL    IV PiggyBack: 300 mL    Propofol: 106.8 mL  Total IN: 2920.6 mL    OUT:    Indwelling Catheter - Urethral (mL): 1805 mL  Total OUT: 1805 mL    Total NET: 1115.6 mL          Medications:    MEDICATIONS  (STANDING):  ampicillin/sulbactam  IVPB 3 Gram(s) IV Intermittent every 6 hours  ampicillin/sulbactam  IVPB      atorvastatin 10 milliGRAM(s) Oral with breakfast  chlorhexidine 0.12% Liquid 15 milliLiter(s) Oral Mucosa every 12 hours  chlorhexidine 2% Cloths 1 Application(s) Topical daily  citalopram 30 milliGRAM(s) Oral with breakfast  dexAMETHasone  IVPB 10 milliGRAM(s) IV Intermittent every 6 hours  enoxaparin Injectable 40 milliGRAM(s) SubCutaneous every 24 hours  fentaNYL   Infusion. 0.5 MICROgram(s)/kG/Hr (2.29 mL/Hr) IV Continuous <Continuous>  lamoTRIgine 100 milliGRAM(s) Oral at bedtime  lithium 600 milliGRAM(s) Oral at bedtime  methylphenidate ER (CONCERTA) 72 milliGRAM(s) Oral every morning  metoprolol tartrate Injectable 2.5 milliGRAM(s) IV Push every 6 hours  propofol Infusion 40.029 MICROgram(s)/kG/Min (11 mL/Hr) IV Continuous <Continuous>  QUEtiapine 100 milliGRAM(s) Oral at bedtime    MEDICATIONS  (PRN):  acetaminophen   Oral Liquid .. 650 milliGRAM(s) Enteral Tube every 6 hours PRN Temp greater or equal to 38C (100.4F), Mild Pain (1 - 3)      Labs:                          7.6    18.32 )-----------( 426      ( 01 Sep 2024 00:42 )             24.2       09-01    144  |  113<H>  |  21  ----------------------------<  127<H>  4.6   |  22  |  0.93    Ca    9.4      01 Sep 2024 00:58  Phos  2.6     09-01  Mg     2.40     09-01

## 2024-09-02 LAB
ANION GAP SERPL CALC-SCNC: 11 MMOL/L — SIGNIFICANT CHANGE UP (ref 7–14)
BLOOD GAS ARTERIAL COMPREHENSIVE RESULT: SIGNIFICANT CHANGE UP
BUN SERPL-MCNC: 28 MG/DL — HIGH (ref 7–23)
CALCIUM SERPL-MCNC: 9.1 MG/DL — SIGNIFICANT CHANGE UP (ref 8.4–10.5)
CHLORIDE SERPL-SCNC: 107 MMOL/L — SIGNIFICANT CHANGE UP (ref 98–107)
CO2 SERPL-SCNC: 26 MMOL/L — SIGNIFICANT CHANGE UP (ref 22–31)
CREAT SERPL-MCNC: 0.95 MG/DL — SIGNIFICANT CHANGE UP (ref 0.5–1.3)
EGFR: 91 ML/MIN/1.73M2 — SIGNIFICANT CHANGE UP
GLUCOSE SERPL-MCNC: 144 MG/DL — HIGH (ref 70–99)
HCT VFR BLD CALC: 25.4 % — LOW (ref 39–50)
HGB BLD-MCNC: 8.4 G/DL — LOW (ref 13–17)
MAGNESIUM SERPL-MCNC: 2.5 MG/DL — SIGNIFICANT CHANGE UP (ref 1.6–2.6)
MCHC RBC-ENTMCNC: 33.1 GM/DL — SIGNIFICANT CHANGE UP (ref 32–36)
MCHC RBC-ENTMCNC: 38.4 PG — HIGH (ref 27–34)
MCV RBC AUTO: 116 FL — HIGH (ref 80–100)
NRBC # BLD: 0 /100 WBCS — SIGNIFICANT CHANGE UP (ref 0–0)
NRBC # FLD: 0 K/UL — SIGNIFICANT CHANGE UP (ref 0–0)
PHOSPHATE SERPL-MCNC: 3.3 MG/DL — SIGNIFICANT CHANGE UP (ref 2.5–4.5)
PLATELET # BLD AUTO: 461 K/UL — HIGH (ref 150–400)
POTASSIUM SERPL-MCNC: 4.7 MMOL/L — SIGNIFICANT CHANGE UP (ref 3.5–5.3)
POTASSIUM SERPL-SCNC: 4.7 MMOL/L — SIGNIFICANT CHANGE UP (ref 3.5–5.3)
PROCALCITONIN SERPL-MCNC: 0.67 NG/ML — HIGH (ref 0.02–0.1)
RBC # BLD: 2.19 M/UL — LOW (ref 4.2–5.8)
RBC # FLD: 12.8 % — SIGNIFICANT CHANGE UP (ref 10.3–14.5)
SODIUM SERPL-SCNC: 144 MMOL/L — SIGNIFICANT CHANGE UP (ref 135–145)
WBC # BLD: 16.83 K/UL — HIGH (ref 3.8–10.5)
WBC # FLD AUTO: 16.83 K/UL — HIGH (ref 3.8–10.5)

## 2024-09-02 PROCEDURE — 71045 X-RAY EXAM CHEST 1 VIEW: CPT | Mod: 26,76

## 2024-09-02 PROCEDURE — 99291 CRITICAL CARE FIRST HOUR: CPT

## 2024-09-02 PROCEDURE — 99292 CRITICAL CARE ADDL 30 MIN: CPT

## 2024-09-02 RX ORDER — HYDROMORPHONE HYDROCHLORIDE 2 MG/1
1 TABLET ORAL EVERY 4 HOURS
Refills: 0 | Status: DISCONTINUED | OUTPATIENT
Start: 2024-09-02 | End: 2024-09-04

## 2024-09-02 RX ORDER — DEXMEDETOMIDINE HYDROCHLORIDE IN 0.9% SODIUM CHLORIDE 4 UG/ML
0.05 INJECTION INTRAVENOUS
Qty: 400 | Refills: 0 | Status: DISCONTINUED | OUTPATIENT
Start: 2024-09-02 | End: 2024-09-03

## 2024-09-02 RX ORDER — HYDROMORPHONE HYDROCHLORIDE 2 MG/1
0.5 TABLET ORAL EVERY 4 HOURS
Refills: 0 | Status: DISCONTINUED | OUTPATIENT
Start: 2024-09-02 | End: 2024-09-04

## 2024-09-02 RX ORDER — FENTANYL CITRATE 50 UG/ML
0.5 INJECTION INTRAMUSCULAR; INTRAVENOUS
Qty: 2500 | Refills: 0 | Status: DISCONTINUED | OUTPATIENT
Start: 2024-09-02 | End: 2024-09-03

## 2024-09-02 RX ADMIN — CHLORHEXIDINE GLUCONATE 15 MILLILITER(S): 40 SOLUTION TOPICAL at 05:09

## 2024-09-02 RX ADMIN — Medication 30 MILLIGRAM(S): at 09:01

## 2024-09-02 RX ADMIN — Medication 100 MILLIGRAM(S): at 21:48

## 2024-09-02 RX ADMIN — Medication 102 MILLIGRAM(S): at 05:08

## 2024-09-02 RX ADMIN — PIPERACILLIN SODIUM AND TAZOBACTAM SODIUM 25 GRAM(S): 3; .375 INJECTION, POWDER, FOR SOLUTION INTRAVENOUS at 14:06

## 2024-09-02 RX ADMIN — LITHIUM CARBONATE 600 MILLIGRAM(S): 150 CAPSULE ORAL at 21:48

## 2024-09-02 RX ADMIN — LAMOTRIGINE 100 MILLIGRAM(S): 100 TABLET, EXTENDED RELEASE ORAL at 21:48

## 2024-09-02 RX ADMIN — FENTANYL CITRATE 2.29 MICROGRAM(S)/KG/HR: 50 INJECTION INTRAMUSCULAR; INTRAVENOUS at 15:33

## 2024-09-02 RX ADMIN — PROPOFOL 11 MICROGRAM(S)/KG/MIN: 10 INJECTION, EMULSION INTRAVENOUS at 07:19

## 2024-09-02 RX ADMIN — ACETAMINOPHEN 650 MILLIGRAM(S): 325 TABLET ORAL at 05:08

## 2024-09-02 RX ADMIN — Medication 10 MILLIGRAM(S): at 09:01

## 2024-09-02 RX ADMIN — PIPERACILLIN SODIUM AND TAZOBACTAM SODIUM 25 GRAM(S): 3; .375 INJECTION, POWDER, FOR SOLUTION INTRAVENOUS at 21:49

## 2024-09-02 RX ADMIN — PROPOFOL 11 MICROGRAM(S)/KG/MIN: 10 INJECTION, EMULSION INTRAVENOUS at 14:06

## 2024-09-02 RX ADMIN — ACETAMINOPHEN 650 MILLIGRAM(S): 325 TABLET ORAL at 12:18

## 2024-09-02 RX ADMIN — DEXMEDETOMIDINE HYDROCHLORIDE IN 0.9% SODIUM CHLORIDE 0.57 MICROGRAM(S)/KG/HR: 4 INJECTION INTRAVENOUS at 20:29

## 2024-09-02 RX ADMIN — PIPERACILLIN SODIUM AND TAZOBACTAM SODIUM 25 GRAM(S): 3; .375 INJECTION, POWDER, FOR SOLUTION INTRAVENOUS at 05:09

## 2024-09-02 RX ADMIN — CHLORHEXIDINE GLUCONATE 15 MILLILITER(S): 40 SOLUTION TOPICAL at 18:05

## 2024-09-02 RX ADMIN — ENOXAPARIN SODIUM 40 MILLIGRAM(S): 100 INJECTION SUBCUTANEOUS at 18:04

## 2024-09-02 RX ADMIN — PROPOFOL 11 MICROGRAM(S)/KG/MIN: 10 INJECTION, EMULSION INTRAVENOUS at 04:41

## 2024-09-02 RX ADMIN — ACETAMINOPHEN 650 MILLIGRAM(S): 325 TABLET ORAL at 23:40

## 2024-09-02 RX ADMIN — FENTANYL CITRATE 2.29 MICROGRAM(S)/KG/HR: 50 INJECTION INTRAMUSCULAR; INTRAVENOUS at 07:18

## 2024-09-02 RX ADMIN — ACETAMINOPHEN 650 MILLIGRAM(S): 325 TABLET ORAL at 23:25

## 2024-09-02 RX ADMIN — ACETAMINOPHEN 650 MILLIGRAM(S): 325 TABLET ORAL at 18:04

## 2024-09-02 NOTE — PROGRESS NOTE ADULT - ATTENDING COMMENTS
The patient was seen and examined, chart and notes reviewed.  The current diagnosis, plan of care and alternatives have been discussed with the patient.  All questions have been answered and updates have been discussed.  The case was discussed with team residents/PA's and medical students at morning surgical rounds and throughout the course of the day.      Respiratory abnormality  a.  Remains MV dependent  b.  With adequate gas exchange  c.  Note of satisfactory air leak  d.  On decadron  e.  Planned extubation in 24 hrs.  Transition propofol to Precedex, hold fentanyl    Sepsis secondary to sublingual abscess  a.  S/P Incision and drainage  b.  On Unasyn  c.  Remains afebrile, WBC stable at 16    Malnutrition  a.  Tolerating TF  b.  Hold in am    Hypertension  a.  Transition lopressor to enteral form

## 2024-09-02 NOTE — PROGRESS NOTE ADULT - SUBJECTIVE AND OBJECTIVE BOX
59M with HTN, HLD, unspecified cognitive disorder. POD4 s/p I&D sublingual abscess.    Interval: NAEON, failed leak test 1, 5, 3% on multiple attempts, switchd from unasyn to zosyn     General: WD, thin, intubated on mechanical ventilation, sedated on prop and fent, NAD  H&N:  lower facial soft swelling improved, inferior border of the mandible fully palpable, no active purulence from the magallon drain   IO: FOM induration soft swelling improved, no signs of hematoma    ICU Vital Signs Last 24 Hrs  T(C): 37.9 (02 Sep 2024 04:00), Max: 38.1 (01 Sep 2024 20:00)  T(F): 100.3 (02 Sep 2024 04:00), Max: 100.5 (01 Sep 2024 20:00)  HR: 68 (02 Sep 2024 07:00) (50 - 68)  BP: 135/85 (02 Sep 2024 07:00) (133/80 - 166/87)  BP(mean): 100 (02 Sep 2024 07:00) (93 - 110)  ABP: --  ABP(mean): --  RR: 11 (02 Sep 2024 07:00) (11 - 14)  SpO2: 95% (02 Sep 2024 07:00) (94% - 100%)    O2 Parameters below as of 02 Sep 2024 07:00  Patient On (Oxygen Delivery Method): ventilator    O2 Concentration (%): 30    I&O's Detail    01 Sep 2024 07:01  -  02 Sep 2024 07:00  --------------------------------------------------------  IN:    Enteral Tube Flush: 50 mL    FentaNYL: 276 mL    Glucerna 1.5: 768 mL    IV PiggyBack: 600 mL    Propofol: 98.4 mL  Total IN: 1792.4 mL    OUT:    Indwelling Catheter - Urethral (mL): 2365 mL    Voided (mL): 0 mL  Total OUT: 2365 mL    Total NET: -572.6 mL    MEDICATIONS  (STANDING):  acetaminophen   Oral Liquid .. 650 milliGRAM(s) Oral every 6 hours  atorvastatin 10 milliGRAM(s) Oral with breakfast  chlorhexidine 0.12% Liquid 15 milliLiter(s) Oral Mucosa every 12 hours  chlorhexidine 2% Cloths 1 Application(s) Topical daily  citalopram 30 milliGRAM(s) Oral with breakfast  dexAMETHasone  IVPB 10 milliGRAM(s) IV Intermittent every 6 hours  enoxaparin Injectable 40 milliGRAM(s) SubCutaneous every 24 hours  fentaNYL   Infusion. 0.5 MICROgram(s)/kG/Hr (2.29 mL/Hr) IV Continuous <Continuous>  lamoTRIgine 100 milliGRAM(s) Oral at bedtime  lithium 600 milliGRAM(s) Oral at bedtime  methylphenidate ER (CONCERTA) 72 milliGRAM(s) Oral every morning  metoprolol tartrate Injectable 2.5 milliGRAM(s) IV Push every 6 hours  piperacillin/tazobactam IVPB.. 3.375 Gram(s) IV Intermittent every 8 hours  propofol Infusion 40.029 MICROgram(s)/kG/Min (11 mL/Hr) IV Continuous <Continuous>  QUEtiapine 100 milliGRAM(s) Oral at bedtime    MEDICATIONS  (PRN):        Labs:                        8.4    16.83 )-----------( 461      ( 02 Sep 2024 01:02 )             25.4     09-02    144  |  107  |  28<H>  ----------------------------<  144<H>  4.7   |  26  |  0.95    Ca    9.1      02 Sep 2024 01:02  Phos  3.3     09-02  Mg     2.50     09-02

## 2024-09-02 NOTE — PROGRESS NOTE ADULT - SUBJECTIVE AND OBJECTIVE BOX
SICU Daily Progress Note  =====================================================  Interval/Overnight Events:       - Plan to wean and DC prop/fent, transition to Precedex at 0000  - Decadron dC'ed    ALLERGIES:  No Known Allergies      --------------------------------------------------------------------------------------    MEDICATIONS:    Neurologic Medications  acetaminophen   Oral Liquid .. 650 milliGRAM(s) Oral every 6 hours  citalopram 30 milliGRAM(s) Oral with breakfast  dexMEDEtomidine Infusion 0.05 MICROgram(s)/kG/Hr IV Continuous <Continuous>  fentaNYL   Infusion 0.5 MICROgram(s)/kG/Hr IV Continuous <Continuous>  HYDROmorphone  Injectable 0.5 milliGRAM(s) IV Push every 4 hours PRN Moderate Pain (4 - 6)  HYDROmorphone  Injectable 1 milliGRAM(s) IV Push every 4 hours PRN Severe Pain (7 - 10)  lamoTRIgine 100 milliGRAM(s) Oral at bedtime  lithium 600 milliGRAM(s) Oral at bedtime  methylphenidate ER (CONCERTA) 72 milliGRAM(s) Oral every morning  propofol Infusion 40.029 MICROgram(s)/kG/Min IV Continuous <Continuous>  QUEtiapine 100 milliGRAM(s) Oral at bedtime    Respiratory Medications    Cardiovascular Medications    Gastrointestinal Medications    Genitourinary Medications    Hematologic/Oncologic Medications  enoxaparin Injectable 40 milliGRAM(s) SubCutaneous every 24 hours    Antimicrobial/Immunologic Medications  piperacillin/tazobactam IVPB.. 3.375 Gram(s) IV Intermittent every 8 hours    Endocrine/Metabolic Medications  atorvastatin 10 milliGRAM(s) Oral with breakfast    Topical/Other Medications  chlorhexidine 0.12% Liquid 15 milliLiter(s) Oral Mucosa every 12 hours  chlorhexidine 2% Cloths 1 Application(s) Topical daily    --------------------------------------------------------------------------------------    VITAL SIGNS:  T(C): 37.2 (09-02-24 @ 20:00), Max: 37.9 (09-02-24 @ 04:00)  HR: 70 (09-02-24 @ 23:00) (52 - 84)  BP: 118/80 (09-02-24 @ 23:00) (116/76 - 181/105)  RR: 11 (09-02-24 @ 23:00) (11 - 14)  SpO2: 100% (09-02-24 @ 23:00) (94% - 100%)  --------------------------------------------------------------------------------------    INS AND OUTS:    09-01-24 @ 07:01  -  09-02-24 @ 07:00  --------------------------------------------------------  IN: 1792.4 mL / OUT: 2365 mL / NET: -572.6 mL    09-02-24 @ 07:01  -  09-02-24 @ 23:18  --------------------------------------------------------  IN: 875.7 mL / OUT: 1200 mL / NET: -324.3 mL      --------------------------------------------------------------------------------------    EXAM    NEURO: NAD, sedated  HEENT: NC/AT   RESPIRATORY: intubated on mechanical ventilation: 12/350/5/30  CARDIO: RRR  ABDOMEN: soft, nontender, nondistended      --------------------------------------------------------------------------------------    LABS                        8.4    16.83 )-----------( 461      ( 02 Sep 2024 01:02 )             25.4   09-02    144  |  107  |  28<H>  ----------------------------<  144<H>  4.7   |  26  |  0.95    Ca    9.1      02 Sep 2024 01:02  Phos  3.3     09-02  Mg     2.50     09-02      --------------------------------------------------------------------------------------

## 2024-09-02 NOTE — PROGRESS NOTE ADULT - SUBJECTIVE AND OBJECTIVE BOX
SICU Daily Progress Note  =====================================================  Interval/Overnight Events: NAOE  - repeat cuff leak test 8/31: failure 1, 5, 3% on multiple attempts  - Abx switchd from unasyn to zosyn   - Magallon to condom cath, tov 5pm; failed and magallon replaced      MEDICATIONS:   --------------------------------------------------------------------------------------  acetaminophen   Oral Liquid .. 650 milliGRAM(s) Oral every 6 hours  atorvastatin 10 milliGRAM(s) Oral with breakfast  chlorhexidine 0.12% Liquid 15 milliLiter(s) Oral Mucosa every 12 hours  chlorhexidine 2% Cloths 1 Application(s) Topical daily  citalopram 30 milliGRAM(s) Oral with breakfast  dexAMETHasone  IVPB 10 milliGRAM(s) IV Intermittent every 6 hours  enoxaparin Injectable 40 milliGRAM(s) SubCutaneous every 24 hours  fentaNYL   Infusion. 0.5 MICROgram(s)/kG/Hr IV Continuous <Continuous>  lamoTRIgine 100 milliGRAM(s) Oral at bedtime  lithium 600 milliGRAM(s) Oral at bedtime  methylphenidate ER (CONCERTA) 72 milliGRAM(s) Oral every morning  metoprolol tartrate Injectable 2.5 milliGRAM(s) IV Push every 6 hours  piperacillin/tazobactam IVPB.. 3.375 Gram(s) IV Intermittent every 8 hours  propofol Infusion 40.029 MICROgram(s)/kG/Min IV Continuous <Continuous>  QUEtiapine 100 milliGRAM(s) Oral at bedtime    --------------------------------------------------------------------------------------    VITAL SIGNS, INS/OUTS (last 24 hours):  --------------------------------------------------------------------------------------  T(C): 38.1 (09-01-24 @ 20:00), Max: 38.1 (09-01-24 @ 20:00)  HR: 53 (09-02-24 @ 00:13) (50 - 67)  BP: 135/80 (09-02-24 @ 00:00) (115/73 - 166/87)  RR: 14 (09-02-24 @ 00:00) (13 - 14)  SpO2: 98% (09-02-24 @ 00:13) (94% - 99%)  Mode: AC/ CMV (Assist Control/ Continuous Mandatory Ventilation), RR (machine): 14, TV (machine): 350, FiO2: 30, PEEP: 5, ITime: 0.8, MAP: 8, PIP: 18    08-31-24 @ 07:01  -  09-01-24 @ 07:00  --------------------------------------------------------  IN: 2920.6 mL / OUT: 1905 mL / NET: 1015.6 mL    09-01-24 @ 07:01  -  09-02-24 @ 00:16  --------------------------------------------------------  IN: 1362.8 mL / OUT: 1685 mL / NET: -322.2 mL      --------------------------------------------------------------------------------------    EXAM  NEURO: NAD, sedated  HEENT: NC/AT   RESPIRATORY: intubated on mechanical ventilation: 14/350/5/30  CARDIO: RRR  ABDOMEN: soft, nontender, nondistended  EXTREMITIES: normal strength, no deformities    TUBES/LINES/DRAINS  [x] Peripheral IV  [] Central Venous Line     	[] R	[] L	[] IJ	[] Fem	[] SC	Date Placed:   [] Arterial Line		[] R	[] L	[] Fem	[] Rad	[] Ax	Date Placed:   [] PICC		[] Midline		[] Mediport  [] Urinary Catheter		Date Placed:     LABS  --------------------------------------------------------------------------------------    --------------------------------------------------------------------------------------    IMAGING STUDIES:

## 2024-09-02 NOTE — PROGRESS NOTE ADULT - ASSESSMENT
59M with HTN, HLD, unspecified cognitive disorder. POD4 s/p I&D sublingual abscess, progressing well in SICU. Improved swelling and FOM soft, non tender.    PLAN:  - continue tube feeds  - Multimodal pain management  - Rec d/c steroids   - Rec ENT scope to evaluate for supraglottic swelling   - Extubation per SICU requirements 59M with HTN, HLD, unspecified cognitive disorder. POD4 s/p I&D sublingual abscess, progressing well in SICU. Improved swelling and FOM soft, non tender.    PLAN:  - continue tube feeds  - Multimodal pain management  - Rec d/c steroids   - Rec ENT scope to evaluate for supraglottic swelling   - Extubation per SICU requirements  - penrose drain removed

## 2024-09-02 NOTE — PROGRESS NOTE ADULT - ASSESSMENT
59M with HTN, HLD, unspecified cognitive disorder. Transferred from [facility] with worsening sublingual swelling. Pt seen at [facility] on Aug 26th for the same complaint, bedside I&D of sublingual space done with IV Unasyn. Pt discharged the following day with PO Augmentin, however pt denied taking his PO abx after discharge. Pt unable to eat nor drink much because of the pain and difficulty swallowing and returning to hospital. Patient received OR I&D of sublingual area and experienced considerable swelling in the airway, transferred to SICU intubated to protect airway and vent management.    PLAN:  Neuro  - Sedated: prop, fent  - Pain control: tylenol  - resume home psych meds via KO tube - citalopram, lamictal, lithium, methylphenidate (on hold d/t cant crush)   - QTc 415     Resp  - Intubated 14/350/5/30  - sublingual edema v hematoma.   - 8/31 failed leaked test 1%   - decadron 10mg q6    Card  - MAP >65  - home statin 10 QD  -  metoprolol 2.5 q6    GI  - Diet: NPO w/ TF via KO       - monitor urine output  - IVL    Heme  - DVT ppx: lovenox & SCD    ID  - Zosyn (9/1-  - Unasyn (8/28-9/1)  - Bcx from [facility]: streptococcus sensitive to pcn    Endocrine  - Monitor glucose    LINES/TUBES/DRAINS  - ET tube  - A-line  - KO tube    Disposition: SICU

## 2024-09-02 NOTE — PROGRESS NOTE ADULT - ASSESSMENT
59M with HTN, HLD, unspecified cognitive disorder. Transferred from [facility] with worsening sublingual swelling. Pt seen at [facility] on Aug 26th for the same complaint, bedside I&D of sublingual space done with IV Unasyn. Pt discharged the following day with PO Augmentin, however pt denied taking his PO abx after discharge. Pt unable to eat nor drink much because of the pain and difficulty swallowing and returning to hospital. Patient received OR I&D of sublingual area and experienced considerable swelling in the airway, transferred to SICU intubated to protect airway and vent management.    PLAN:  Neuro  - Sedated: prop, fent  - Pain control: tylenol  - resume home psych meds via KO tube - citalopram, lamictal, lithium, methylphenidate (on hold d/t cant crush)   - QTc 415     Resp  - Intubated 12/350/5/30  - sublingual edema v hematoma.   - 8/31 failed leaked test 1%     Card  - MAP >65  - home statin 10 QD  -  metoprolol 2.5 q6    GI  - Diet: NPO w/ TF via KO       - monitor urine output via magallon  - IVL    Heme  - DVT ppx: lovenox & SCD    ID  - Zosyn (9/1-  - Unasyn (8/28-9/1)  - Bcx from [facility]: streptococcus sensitive to pcn    Endocrine  - Monitor glucose    LINES/TUBES/DRAINS  - ET tube  - A-line  - KO tube    Disposition: SICU

## 2024-09-03 LAB
ADD ON TEST-SPECIMEN IN LAB: SIGNIFICANT CHANGE UP
ANION GAP SERPL CALC-SCNC: 13 MMOL/L — SIGNIFICANT CHANGE UP (ref 7–14)
BASOPHILS # BLD AUTO: 0.04 K/UL — SIGNIFICANT CHANGE UP (ref 0–0.2)
BASOPHILS NFR BLD AUTO: 0.1 % — SIGNIFICANT CHANGE UP (ref 0–2)
BLOOD GAS ARTERIAL - LYTES,HGB,ICA,LACT RESULT: SIGNIFICANT CHANGE UP
BLOOD GAS ARTERIAL COMPREHENSIVE RESULT: SIGNIFICANT CHANGE UP
BUN SERPL-MCNC: 33 MG/DL — HIGH (ref 7–23)
CALCIUM SERPL-MCNC: 8.9 MG/DL — SIGNIFICANT CHANGE UP (ref 8.4–10.5)
CHLORIDE SERPL-SCNC: 105 MMOL/L — SIGNIFICANT CHANGE UP (ref 98–107)
CO2 SERPL-SCNC: 26 MMOL/L — SIGNIFICANT CHANGE UP (ref 22–31)
CREAT SERPL-MCNC: 1.04 MG/DL — SIGNIFICANT CHANGE UP (ref 0.5–1.3)
EGFR: 82 ML/MIN/1.73M2 — SIGNIFICANT CHANGE UP
EOSINOPHIL # BLD AUTO: 0.06 K/UL — SIGNIFICANT CHANGE UP (ref 0–0.5)
EOSINOPHIL NFR BLD AUTO: 0.2 % — SIGNIFICANT CHANGE UP (ref 0–6)
GAS PNL BLDA: SIGNIFICANT CHANGE UP
GLUCOSE SERPL-MCNC: 158 MG/DL — HIGH (ref 70–99)
GRAM STN FLD: ABNORMAL
HCT VFR BLD CALC: 26 % — LOW (ref 39–50)
HCT VFR BLD CALC: 27.3 % — LOW (ref 39–50)
HGB BLD-MCNC: 8.6 G/DL — LOW (ref 13–17)
HGB BLD-MCNC: 8.9 G/DL — LOW (ref 13–17)
IANC: 24.67 K/UL — HIGH (ref 1.8–7.4)
IMM GRANULOCYTES NFR BLD AUTO: 1.4 % — HIGH (ref 0–0.9)
LYMPHOCYTES # BLD AUTO: 1.74 K/UL — SIGNIFICANT CHANGE UP (ref 1–3.3)
LYMPHOCYTES # BLD AUTO: 6 % — LOW (ref 13–44)
MAGNESIUM SERPL-MCNC: 2.5 MG/DL — SIGNIFICANT CHANGE UP (ref 1.6–2.6)
MCHC RBC-ENTMCNC: 32.6 GM/DL — SIGNIFICANT CHANGE UP (ref 32–36)
MCHC RBC-ENTMCNC: 33.1 GM/DL — SIGNIFICANT CHANGE UP (ref 32–36)
MCHC RBC-ENTMCNC: 38.1 PG — HIGH (ref 27–34)
MCHC RBC-ENTMCNC: 38.4 PG — HIGH (ref 27–34)
MCV RBC AUTO: 115 FL — HIGH (ref 80–100)
MCV RBC AUTO: 117.7 FL — HIGH (ref 80–100)
MONOCYTES # BLD AUTO: 2.21 K/UL — HIGH (ref 0–0.9)
MONOCYTES NFR BLD AUTO: 7.6 % — SIGNIFICANT CHANGE UP (ref 2–14)
NEUTROPHILS # BLD AUTO: 24.67 K/UL — HIGH (ref 1.8–7.4)
NEUTROPHILS NFR BLD AUTO: 84.7 % — HIGH (ref 43–77)
NRBC # BLD: 0 /100 WBCS — SIGNIFICANT CHANGE UP (ref 0–0)
NRBC # BLD: 0 /100 WBCS — SIGNIFICANT CHANGE UP (ref 0–0)
NRBC # FLD: 0.11 K/UL — HIGH (ref 0–0)
NRBC # FLD: 0.12 K/UL — HIGH (ref 0–0)
PHOSPHATE SERPL-MCNC: 2.6 MG/DL — SIGNIFICANT CHANGE UP (ref 2.5–4.5)
PLATELET # BLD AUTO: 443 K/UL — HIGH (ref 150–400)
PLATELET # BLD AUTO: 477 K/UL — HIGH (ref 150–400)
POTASSIUM SERPL-MCNC: 3.9 MMOL/L — SIGNIFICANT CHANGE UP (ref 3.5–5.3)
POTASSIUM SERPL-SCNC: 3.9 MMOL/L — SIGNIFICANT CHANGE UP (ref 3.5–5.3)
RBC # BLD: 2.26 M/UL — LOW (ref 4.2–5.8)
RBC # BLD: 2.32 M/UL — LOW (ref 4.2–5.8)
RBC # FLD: 12.5 % — SIGNIFICANT CHANGE UP (ref 10.3–14.5)
RBC # FLD: 12.8 % — SIGNIFICANT CHANGE UP (ref 10.3–14.5)
SODIUM SERPL-SCNC: 144 MMOL/L — SIGNIFICANT CHANGE UP (ref 135–145)
SPECIMEN SOURCE: SIGNIFICANT CHANGE UP
WBC # BLD: 18.4 K/UL — HIGH (ref 3.8–10.5)
WBC # BLD: 28.88 K/UL — HIGH (ref 3.8–10.5)
WBC # FLD AUTO: 18.4 K/UL — HIGH (ref 3.8–10.5)
WBC # FLD AUTO: 28.88 K/UL — HIGH (ref 3.8–10.5)

## 2024-09-03 PROCEDURE — 71045 X-RAY EXAM CHEST 1 VIEW: CPT | Mod: 26,76

## 2024-09-03 PROCEDURE — 99292 CRITICAL CARE ADDL 30 MIN: CPT

## 2024-09-03 PROCEDURE — 71045 X-RAY EXAM CHEST 1 VIEW: CPT | Mod: 26,77

## 2024-09-03 PROCEDURE — 93010 ELECTROCARDIOGRAM REPORT: CPT

## 2024-09-03 PROCEDURE — 99291 CRITICAL CARE FIRST HOUR: CPT

## 2024-09-03 RX ORDER — METOPROLOL TARTRATE 100 MG/1
2.5 TABLET ORAL EVERY 6 HOURS
Refills: 0 | Status: DISCONTINUED | OUTPATIENT
Start: 2024-09-03 | End: 2024-09-03

## 2024-09-03 RX ORDER — ACETAZOLAMIDE 250 MG
250 TABLET ORAL EVERY 6 HOURS
Refills: 0 | Status: COMPLETED | OUTPATIENT
Start: 2024-09-03 | End: 2024-09-04

## 2024-09-03 RX ORDER — SENNA 187 MG
2 TABLET ORAL AT BEDTIME
Refills: 0 | Status: DISCONTINUED | OUTPATIENT
Start: 2024-09-03 | End: 2024-09-03

## 2024-09-03 RX ORDER — METOPROLOL TARTRATE 100 MG/1
12.5 TABLET ORAL
Refills: 0 | Status: DISCONTINUED | OUTPATIENT
Start: 2024-09-03 | End: 2024-09-03

## 2024-09-03 RX ORDER — FUROSEMIDE 40 MG
20 TABLET ORAL ONCE
Refills: 0 | Status: COMPLETED | OUTPATIENT
Start: 2024-09-03 | End: 2024-09-03

## 2024-09-03 RX ORDER — POLYETHYLENE GLYCOL 3350 17 G/17G
17 POWDER, FOR SOLUTION ORAL DAILY
Refills: 0 | Status: DISCONTINUED | OUTPATIENT
Start: 2024-09-03 | End: 2024-09-03

## 2024-09-03 RX ORDER — PANTOPRAZOLE SODIUM 40 MG
40 TABLET, DELAYED RELEASE (ENTERIC COATED) ORAL
Refills: 0 | Status: DISCONTINUED | OUTPATIENT
Start: 2024-09-03 | End: 2024-09-03

## 2024-09-03 RX ORDER — METOPROLOL TARTRATE 100 MG/1
5 TABLET ORAL ONCE
Refills: 0 | Status: COMPLETED | OUTPATIENT
Start: 2024-09-03 | End: 2024-09-03

## 2024-09-03 RX ORDER — PANTOPRAZOLE SODIUM 40 MG
40 TABLET, DELAYED RELEASE (ENTERIC COATED) ORAL DAILY
Refills: 0 | Status: DISCONTINUED | OUTPATIENT
Start: 2024-09-03 | End: 2024-09-04

## 2024-09-03 RX ORDER — METOPROLOL TARTRATE 100 MG/1
12.5 TABLET ORAL
Refills: 0 | Status: DISCONTINUED | OUTPATIENT
Start: 2024-09-03 | End: 2024-09-04

## 2024-09-03 RX ORDER — ACETAMINOPHEN 325 MG/1
1000 TABLET ORAL EVERY 6 HOURS
Refills: 0 | Status: COMPLETED | OUTPATIENT
Start: 2024-09-03 | End: 2024-09-04

## 2024-09-03 RX ORDER — HYDRALAZINE HCL 50 MG
5 TABLET ORAL ONCE
Refills: 0 | Status: COMPLETED | OUTPATIENT
Start: 2024-09-03 | End: 2024-09-03

## 2024-09-03 RX ADMIN — PIPERACILLIN SODIUM AND TAZOBACTAM SODIUM 25 GRAM(S): 3; .375 INJECTION, POWDER, FOR SOLUTION INTRAVENOUS at 05:20

## 2024-09-03 RX ADMIN — LAMOTRIGINE 100 MILLIGRAM(S): 100 TABLET, EXTENDED RELEASE ORAL at 21:39

## 2024-09-03 RX ADMIN — METOPROLOL TARTRATE 12.5 MILLIGRAM(S): 100 TABLET ORAL at 16:06

## 2024-09-03 RX ADMIN — Medication 5 MILLIGRAM(S): at 13:01

## 2024-09-03 RX ADMIN — Medication 100 MILLILITER(S): at 18:20

## 2024-09-03 RX ADMIN — PROPOFOL 11 MICROGRAM(S)/KG/MIN: 10 INJECTION, EMULSION INTRAVENOUS at 01:33

## 2024-09-03 RX ADMIN — PROPOFOL 11 MICROGRAM(S)/KG/MIN: 10 INJECTION, EMULSION INTRAVENOUS at 07:52

## 2024-09-03 RX ADMIN — CHLORHEXIDINE GLUCONATE 1 APPLICATION(S): 40 SOLUTION TOPICAL at 13:04

## 2024-09-03 RX ADMIN — METOPROLOL TARTRATE 5 MILLIGRAM(S): 100 TABLET ORAL at 21:39

## 2024-09-03 RX ADMIN — ENOXAPARIN SODIUM 40 MILLIGRAM(S): 100 INJECTION SUBCUTANEOUS at 18:20

## 2024-09-03 RX ADMIN — Medication 250 MILLIGRAM(S): at 18:20

## 2024-09-03 RX ADMIN — DEXMEDETOMIDINE HYDROCHLORIDE IN 0.9% SODIUM CHLORIDE 0.57 MICROGRAM(S)/KG/HR: 4 INJECTION INTRAVENOUS at 05:19

## 2024-09-03 RX ADMIN — HYDROMORPHONE HYDROCHLORIDE 0.5 MILLIGRAM(S): 2 TABLET ORAL at 10:20

## 2024-09-03 RX ADMIN — LITHIUM CARBONATE 600 MILLIGRAM(S): 150 CAPSULE ORAL at 21:39

## 2024-09-03 RX ADMIN — PIPERACILLIN SODIUM AND TAZOBACTAM SODIUM 25 GRAM(S): 3; .375 INJECTION, POWDER, FOR SOLUTION INTRAVENOUS at 21:41

## 2024-09-03 RX ADMIN — ACETAMINOPHEN 1000 MILLIGRAM(S): 325 TABLET ORAL at 18:14

## 2024-09-03 RX ADMIN — Medication 100 MILLIGRAM(S): at 21:40

## 2024-09-03 RX ADMIN — ACETAMINOPHEN 400 MILLIGRAM(S): 325 TABLET ORAL at 17:45

## 2024-09-03 RX ADMIN — CHLORHEXIDINE GLUCONATE 15 MILLILITER(S): 40 SOLUTION TOPICAL at 05:19

## 2024-09-03 RX ADMIN — ACETAMINOPHEN 650 MILLIGRAM(S): 325 TABLET ORAL at 05:35

## 2024-09-03 RX ADMIN — Medication 10 MILLIGRAM(S): at 07:37

## 2024-09-03 RX ADMIN — HYDROMORPHONE HYDROCHLORIDE 0.5 MILLIGRAM(S): 2 TABLET ORAL at 20:04

## 2024-09-03 RX ADMIN — HYDROMORPHONE HYDROCHLORIDE 0.5 MILLIGRAM(S): 2 TABLET ORAL at 10:03

## 2024-09-03 RX ADMIN — Medication 2 TABLET(S): at 03:39

## 2024-09-03 RX ADMIN — Medication 10 MILLIGRAM(S): at 03:39

## 2024-09-03 RX ADMIN — POLYETHYLENE GLYCOL 3350 17 GRAM(S): 17 POWDER, FOR SOLUTION ORAL at 03:39

## 2024-09-03 RX ADMIN — ACETAMINOPHEN 650 MILLIGRAM(S): 325 TABLET ORAL at 05:20

## 2024-09-03 RX ADMIN — DEXMEDETOMIDINE HYDROCHLORIDE IN 0.9% SODIUM CHLORIDE 0.57 MICROGRAM(S)/KG/HR: 4 INJECTION INTRAVENOUS at 07:52

## 2024-09-03 RX ADMIN — PIPERACILLIN SODIUM AND TAZOBACTAM SODIUM 25 GRAM(S): 3; .375 INJECTION, POWDER, FOR SOLUTION INTRAVENOUS at 13:04

## 2024-09-03 RX ADMIN — Medication 30 MILLIGRAM(S): at 07:36

## 2024-09-03 RX ADMIN — Medication 20 MILLIGRAM(S): at 09:00

## 2024-09-03 RX ADMIN — FENTANYL CITRATE 2.29 MICROGRAM(S)/KG/HR: 50 INJECTION INTRAMUSCULAR; INTRAVENOUS at 07:52

## 2024-09-03 RX ADMIN — HYDROMORPHONE HYDROCHLORIDE 0.5 MILLIGRAM(S): 2 TABLET ORAL at 20:19

## 2024-09-03 NOTE — PROGRESS NOTE ADULT - SUBJECTIVE AND OBJECTIVE BOX
"[facility]  Obstetric Progress Note    Subjective   Still feeling painful contractions    Objective     Vitals:  Vitals:    21 1700 21 1800 21 1830 21 1900   BP: 131/88 131/88 141/94 142/76   BP Location:       Patient Position:       Pulse: 98 100 95 111   Resp:       Temp:    98.5 °F (36.9 °C)   TempSrc:    Oral   SpO2: 100%  99%    Weight:       Height:         Flowsheet Rows      First Filed Value   Admission Height  165.1 cm (65\") Documented at 2021 1446   Admission Weight  69.4 kg (153 lb) Documented at 2021 1446        No intake or output data in the 24 hours ending 21 2135    Fetal Heart Rate Assessment:   140, mod variability  [place]:  Every 2-8 min    Physical Exam:  General: Patient is in no acute distress    Pelvic Exam: /-            Assessment/Plan     Active Problems:     labor         Assessment:  1.  Intrauterine pregnancy at 36w3d gestation with reactive fetal status.    2.  labor  with ROM  3.  GBS status: No results found for: STREPGPB  4.  FSR    Plan:  1. Vaginal anticipated  2. PCN G for GBS prophylaxis      Mary Kay Newman MD  3/20/2021  21:35 EDT      " SICU Daily Progress Note  =====================================================  Interval/Events:    ALLERGIES:  No Known Allergies      --------------------------------------------------------------------------------------    MEDICATIONS:    Neurologic Medications  acetaminophen   Oral Liquid .. 650 milliGRAM(s) Oral every 6 hours  citalopram 30 milliGRAM(s) Oral with breakfast  dexMEDEtomidine Infusion 0.05 MICROgram(s)/kG/Hr IV Continuous <Continuous>  fentaNYL   Infusion 0.5 MICROgram(s)/kG/Hr IV Continuous <Continuous>  HYDROmorphone  Injectable 0.5 milliGRAM(s) IV Push every 4 hours PRN Moderate Pain (4 - 6)  HYDROmorphone  Injectable 1 milliGRAM(s) IV Push every 4 hours PRN Severe Pain (7 - 10)  lamoTRIgine 100 milliGRAM(s) Oral at bedtime  lithium 600 milliGRAM(s) Oral at bedtime  methylphenidate ER (CONCERTA) 72 milliGRAM(s) Oral every morning  propofol Infusion 40.029 MICROgram(s)/kG/Min IV Continuous <Continuous>  QUEtiapine 100 milliGRAM(s) Oral at bedtime    Respiratory Medications    Cardiovascular Medications  metoprolol tartrate 12.5 milliGRAM(s) Oral two times a day    Gastrointestinal Medications  polyethylene glycol 3350 17 Gram(s) Oral daily  senna 2 Tablet(s) Oral at bedtime    Genitourinary Medications    Hematologic/Oncologic Medications  enoxaparin Injectable 40 milliGRAM(s) SubCutaneous every 24 hours    Antimicrobial/Immunologic Medications  piperacillin/tazobactam IVPB.. 3.375 Gram(s) IV Intermittent every 8 hours    Endocrine/Metabolic Medications  atorvastatin 10 milliGRAM(s) Oral with breakfast    Topical/Other Medications  chlorhexidine 0.12% Liquid 15 milliLiter(s) Oral Mucosa every 12 hours  chlorhexidine 2% Cloths 1 Application(s) Topical daily    --------------------------------------------------------------------------------------    VITAL SIGNS:  T(C): 37.7 (09-03-24 @ 08:00), Max: 37.8 (09-02-24 @ 16:00)  HR: 136 (09-03-24 @ 10:00) (52 - 136)  BP: 128/89 (09-03-24 @ 10:00) (101/71 - 181/105)  RR: 28 (09-03-24 @ 10:00) (8 - 28)  SpO2: 92% (09-03-24 @ 10:00) (92% - 100%)  --------------------------------------------------------------------------------------    INS AND OUTS:    09-02-24 @ 07:01  -  09-03-24 @ 07:00  --------------------------------------------------------  IN: 1349.9 mL / OUT: 1720 mL / NET: -370.1 mL    09-03-24 @ 07:01  -  09-03-24 @ 11:16  --------------------------------------------------------  IN: 39 mL / OUT: 1235 mL / NET: -1196 mL      --------------------------------------------------------------------------------------    EXAM    NEURO: NAD,   HEENT: NC/AT  RESPIRATORY: nonlabored respirations, normal CW expansion  CARDIO: RRR, S1S2  ABDOMEN: soft, nontender, nondistended, +/- NGT, ostomy, surgical dressing c/d/i, TAQUERIA drain output  EXTREMITIES: normal strength, no deformities    -------------------------------------------------------------------------------------  LABS                        8.9    18.40 )-----------( 443      ( 03 Sep 2024 01:10 )             27.3     09-03    144  |  105  |  33<H>  ----------------------------<  158<H>  3.9   |  26  |  1.04    Ca    8.9      03 Sep 2024 01:10  Phos  2.6     09-03  Mg     2.50     09-03        Urinalysis Basic - ( 03 Sep 2024 01:10 )    Color: x / Appearance: x / SG: x / pH: x  Gluc: 158 mg/dL / Ketone: x  / Bili: x / Urobili: x   Blood: x / Protein: x / Nitrite: x   Leuk Esterase: x / RBC: x / WBC x   Sq Epi: x / Non Sq Epi: x / Bacteria: x      -------------------------------------------------------------------------------------- SICU Daily Progress Note  =====================================================  Interval/Events:  - Extubated to NRB with anesthesia. Extubation uneventful, patient stable following and is alert.   - ABG    ALLERGIES:  No Known Allergies      --------------------------------------------------------------------------------------    MEDICATIONS:    Neurologic Medications  acetaminophen   Oral Liquid .. 650 milliGRAM(s) Oral every 6 hours  citalopram 30 milliGRAM(s) Oral with breakfast  dexMEDEtomidine Infusion 0.05 MICROgram(s)/kG/Hr IV Continuous <Continuous>  fentaNYL   Infusion 0.5 MICROgram(s)/kG/Hr IV Continuous <Continuous>  HYDROmorphone  Injectable 0.5 milliGRAM(s) IV Push every 4 hours PRN Moderate Pain (4 - 6)  HYDROmorphone  Injectable 1 milliGRAM(s) IV Push every 4 hours PRN Severe Pain (7 - 10)  lamoTRIgine 100 milliGRAM(s) Oral at bedtime  lithium 600 milliGRAM(s) Oral at bedtime  methylphenidate ER (CONCERTA) 72 milliGRAM(s) Oral every morning  propofol Infusion 40.029 MICROgram(s)/kG/Min IV Continuous <Continuous>  QUEtiapine 100 milliGRAM(s) Oral at bedtime    Respiratory Medications    Cardiovascular Medications  metoprolol tartrate 12.5 milliGRAM(s) Oral two times a day    Gastrointestinal Medications  polyethylene glycol 3350 17 Gram(s) Oral daily  senna 2 Tablet(s) Oral at bedtime    Genitourinary Medications    Hematologic/Oncologic Medications  enoxaparin Injectable 40 milliGRAM(s) SubCutaneous every 24 hours    Antimicrobial/Immunologic Medications  piperacillin/tazobactam IVPB.. 3.375 Gram(s) IV Intermittent every 8 hours    Endocrine/Metabolic Medications  atorvastatin 10 milliGRAM(s) Oral with breakfast    Topical/Other Medications  chlorhexidine 0.12% Liquid 15 milliLiter(s) Oral Mucosa every 12 hours  chlorhexidine 2% Cloths 1 Application(s) Topical daily    --------------------------------------------------------------------------------------    VITAL SIGNS:  T(C): 37.7 (09-03-24 @ 08:00), Max: 37.8 (09-02-24 @ 16:00)  HR: 136 (09-03-24 @ 10:00) (52 - 136)  BP: 128/89 (09-03-24 @ 10:00) (101/71 - 181/105)  RR: 28 (09-03-24 @ 10:00) (8 - 28)  SpO2: 92% (09-03-24 @ 10:00) (92% - 100%)  --------------------------------------------------------------------------------------    INS AND OUTS:    09-02-24 @ 07:01  -  09-03-24 @ 07:00  --------------------------------------------------------  IN: 1349.9 mL / OUT: 1720 mL / NET: -370.1 mL    09-03-24 @ 07:01  -  09-03-24 @ 11:16  --------------------------------------------------------  IN: 39 mL / OUT: 1235 mL / NET: -1196 mL      --------------------------------------------------------------------------------------    EXAM    NEURO: NAD,   HEENT: NC/AT  RESPIRATORY: nonlabored respirations, normal CW expansion  CARDIO: RRR, S1S2  ABDOMEN: soft, nontender, nondistended, +/- NGT, ostomy, surgical dressing c/d/i, TAQUERIA drain output  EXTREMITIES: normal strength, no deformities    -------------------------------------------------------------------------------------  LABS                        8.9    18.40 )-----------( 443      ( 03 Sep 2024 01:10 )             27.3     09-03    144  |  105  |  33<H>  ----------------------------<  158<H>  3.9   |  26  |  1.04    Ca    8.9      03 Sep 2024 01:10  Phos  2.6     09-03  Mg     2.50     09-03        Urinalysis Basic - ( 03 Sep 2024 01:10 )    Color: x / Appearance: x / SG: x / pH: x  Gluc: 158 mg/dL / Ketone: x  / Bili: x / Urobili: x   Blood: x / Protein: x / Nitrite: x   Leuk Esterase: x / RBC: x / WBC x   Sq Epi: x / Non Sq Epi: x / Bacteria: x      -------------------------------------------------------------------------------------- SICU Daily Progress Note  =====================================================  Interval/Events:  - Extubated to NRB with anesthesia. Extubation uneventful, patient stable following, satting well and is alert/responsive.  - Follow-up ABG?    ALLERGIES:  No Known Allergies      --------------------------------------------------------------------------------------    MEDICATIONS:    Neurologic Medications  acetaminophen   Oral Liquid .. 650 milliGRAM(s) Oral every 6 hours  citalopram 30 milliGRAM(s) Oral with breakfast  dexMEDEtomidine Infusion 0.05 MICROgram(s)/kG/Hr IV Continuous <Continuous>  fentaNYL   Infusion 0.5 MICROgram(s)/kG/Hr IV Continuous <Continuous>  HYDROmorphone  Injectable 0.5 milliGRAM(s) IV Push every 4 hours PRN Moderate Pain (4 - 6)  HYDROmorphone  Injectable 1 milliGRAM(s) IV Push every 4 hours PRN Severe Pain (7 - 10)  lamoTRIgine 100 milliGRAM(s) Oral at bedtime  lithium 600 milliGRAM(s) Oral at bedtime  methylphenidate ER (CONCERTA) 72 milliGRAM(s) Oral every morning  propofol Infusion 40.029 MICROgram(s)/kG/Min IV Continuous <Continuous>  QUEtiapine 100 milliGRAM(s) Oral at bedtime    Respiratory Medications    Cardiovascular Medications  metoprolol tartrate 12.5 milliGRAM(s) Oral two times a day    Gastrointestinal Medications  polyethylene glycol 3350 17 Gram(s) Oral daily  senna 2 Tablet(s) Oral at bedtime    Genitourinary Medications    Hematologic/Oncologic Medications  enoxaparin Injectable 40 milliGRAM(s) SubCutaneous every 24 hours    Antimicrobial/Immunologic Medications  piperacillin/tazobactam IVPB.. 3.375 Gram(s) IV Intermittent every 8 hours    Endocrine/Metabolic Medications  atorvastatin 10 milliGRAM(s) Oral with breakfast    Topical/Other Medications  chlorhexidine 0.12% Liquid 15 milliLiter(s) Oral Mucosa every 12 hours  chlorhexidine 2% Cloths 1 Application(s) Topical daily    --------------------------------------------------------------------------------------    VITAL SIGNS:  T(C): 37.7 (09-03-24 @ 08:00), Max: 37.8 (09-02-24 @ 16:00)  HR: 136 (09-03-24 @ 10:00) (52 - 136)  BP: 128/89 (09-03-24 @ 10:00) (101/71 - 181/105)  RR: 28 (09-03-24 @ 10:00) (8 - 28)  SpO2: 92% (09-03-24 @ 10:00) (92% - 100%)  --------------------------------------------------------------------------------------    INS AND OUTS:    09-02-24 @ 07:01  -  09-03-24 @ 07:00  --------------------------------------------------------  IN: 1349.9 mL / OUT: 1720 mL / NET: -370.1 mL    09-03-24 @ 07:01  -  09-03-24 @ 11:16  --------------------------------------------------------  IN: 39 mL / OUT: 1235 mL / NET: -1196 mL      --------------------------------------------------------------------------------------    EXAM    NEURO: NAD  HEENT: NC/AT  RESPIRATORY: nonlabored respirations, normal CW expansion  CARDIO: RRR, S1S2  ABDOMEN: soft, nontender, nondistended, +NGT, surgical dressing c/d/i  EXTREMITIES: normal strength, no deformities    -------------------------------------------------------------------------------------  LABS                        8.9    18.40 )-----------( 443      ( 03 Sep 2024 01:10 )             27.3     09-03    144  |  105  |  33<H>  ----------------------------<  158<H>  3.9   |  26  |  1.04    Ca    8.9      03 Sep 2024 01:10  Phos  2.6     09-03  Mg     2.50     09-03        Urinalysis Basic - ( 03 Sep 2024 01:10 )    Color: x / Appearance: x / SG: x / pH: x  Gluc: 158 mg/dL / Ketone: x  / Bili: x / Urobili: x   Blood: x / Protein: x / Nitrite: x   Leuk Esterase: x / RBC: x / WBC x   Sq Epi: x / Non Sq Epi: x / Bacteria: x      --------------------------------------------------------------------------------------

## 2024-09-03 NOTE — PROVIDER CONTACT NOTE (CHANGE IN STATUS NOTIFICATION) - SITUATION
received patient this morning around 9am, found to be having multiple large liquid bowel movements within past hr as per morning RN. subsequently rectal tube placed at that time, team made aware, s/p recv'd miralax, senna, suppository for constipation. patient tachycardic, febrile 101.3 F rectal, occassionally gagging, NGT to LWS. upon turning patient at 3pm noted large liquid dark brown stool with blood clots and blood/red streak in color

## 2024-09-03 NOTE — PROGRESS NOTE ADULT - SUBJECTIVE AND OBJECTIVE BOX
SICU Daily Progress Note  =====================================================  Interval/Overnight Events:       - Plan to wean prop/fent as tolerated, transition to Precedex   - tachycardic and hypertensive with attempts to wean off prop/fent  - Decadron DC'ed    ALLERGIES:  No Known Allergies      --------------------------------------------------------------------------------------    MEDICATIONS:    Neurologic Medications  acetaminophen   Oral Liquid .. 650 milliGRAM(s) Oral every 6 hours  citalopram 30 milliGRAM(s) Oral with breakfast  dexMEDEtomidine Infusion 0.05 MICROgram(s)/kG/Hr IV Continuous <Continuous>  fentaNYL   Infusion 0.5 MICROgram(s)/kG/Hr IV Continuous <Continuous>  HYDROmorphone  Injectable 0.5 milliGRAM(s) IV Push every 4 hours PRN Moderate Pain (4 - 6)  HYDROmorphone  Injectable 1 milliGRAM(s) IV Push every 4 hours PRN Severe Pain (7 - 10)  lamoTRIgine 100 milliGRAM(s) Oral at bedtime  lithium 600 milliGRAM(s) Oral at bedtime  methylphenidate ER (CONCERTA) 72 milliGRAM(s) Oral every morning  propofol Infusion 40.029 MICROgram(s)/kG/Min IV Continuous <Continuous>  QUEtiapine 100 milliGRAM(s) Oral at bedtime    Respiratory Medications    Cardiovascular Medications    Gastrointestinal Medications    Genitourinary Medications    Hematologic/Oncologic Medications  enoxaparin Injectable 40 milliGRAM(s) SubCutaneous every 24 hours    Antimicrobial/Immunologic Medications  piperacillin/tazobactam IVPB.. 3.375 Gram(s) IV Intermittent every 8 hours    Endocrine/Metabolic Medications  atorvastatin 10 milliGRAM(s) Oral with breakfast    Topical/Other Medications  chlorhexidine 0.12% Liquid 15 milliLiter(s) Oral Mucosa every 12 hours  chlorhexidine 2% Cloths 1 Application(s) Topical daily    --------------------------------------------------------------------------------------    VITAL SIGNS:  T(C): 37.2 (09-02-24 @ 20:00), Max: 37.9 (09-02-24 @ 04:00)  HR: 76 (09-02-24 @ 23:20) (52 - 84)  BP: 118/80 (09-02-24 @ 23:00) (116/76 - 181/105)  RR: 11 (09-02-24 @ 23:00) (11 - 14)  SpO2: 100% (09-02-24 @ 23:20) (94% - 100%)  --------------------------------------------------------------------------------------    INS AND OUTS:    09-01-24 @ 07:01  -  09-02-24 @ 07:00  --------------------------------------------------------  IN: 1792.4 mL / OUT: 2365 mL / NET: -572.6 mL    09-02-24 @ 07:01  -  09-03-24 @ 00:21  --------------------------------------------------------  IN: 875.7 mL / OUT: 1200 mL / NET: -324.3 mL      --------------------------------------------------------------------------------------    EXAM    NEURO: NAD, sedated  HEENT: NC/AT   RESPIRATORY: intubated on mechanical ventilation: 12/350/5/30  CARDIO: RRR  ABDOMEN: soft, nontender, nondistended    --------------------------------------------------------------------------------------    LABS                        8.4    16.83 )-----------( 461      ( 02 Sep 2024 01:02 )             25.4   09-02    144  |  107  |  28<H>  ----------------------------<  144<H>  4.7   |  26  |  0.95    Ca    9.1      02 Sep 2024 01:02  Phos  3.3     09-02  Mg     2.50     09-02        -------------------------------------------------------------------------------------- SICU Daily Progress Note  =====================================================  Interval/Overnight Events:       - Plan to wean prop/fent as tolerated, transition to Precedex   - tachycardic and hypertensive with attempts to wean off prop/fent, now on prop/fent/Precedex, planning to wean prop/fent pending hemodynamic stability  - Decadron DC'ed    ALLERGIES:  No Known Allergies      --------------------------------------------------------------------------------------    MEDICATIONS:    Neurologic Medications  acetaminophen   Oral Liquid .. 650 milliGRAM(s) Oral every 6 hours  citalopram 30 milliGRAM(s) Oral with breakfast  dexMEDEtomidine Infusion 0.05 MICROgram(s)/kG/Hr IV Continuous <Continuous>  fentaNYL   Infusion 0.5 MICROgram(s)/kG/Hr IV Continuous <Continuous>  HYDROmorphone  Injectable 0.5 milliGRAM(s) IV Push every 4 hours PRN Moderate Pain (4 - 6)  HYDROmorphone  Injectable 1 milliGRAM(s) IV Push every 4 hours PRN Severe Pain (7 - 10)  lamoTRIgine 100 milliGRAM(s) Oral at bedtime  lithium 600 milliGRAM(s) Oral at bedtime  methylphenidate ER (CONCERTA) 72 milliGRAM(s) Oral every morning  propofol Infusion 40.029 MICROgram(s)/kG/Min IV Continuous <Continuous>  QUEtiapine 100 milliGRAM(s) Oral at bedtime    Respiratory Medications    Cardiovascular Medications    Gastrointestinal Medications    Genitourinary Medications    Hematologic/Oncologic Medications  enoxaparin Injectable 40 milliGRAM(s) SubCutaneous every 24 hours    Antimicrobial/Immunologic Medications  piperacillin/tazobactam IVPB.. 3.375 Gram(s) IV Intermittent every 8 hours    Endocrine/Metabolic Medications  atorvastatin 10 milliGRAM(s) Oral with breakfast    Topical/Other Medications  chlorhexidine 0.12% Liquid 15 milliLiter(s) Oral Mucosa every 12 hours  chlorhexidine 2% Cloths 1 Application(s) Topical daily    --------------------------------------------------------------------------------------    VITAL SIGNS:  T(C): 37.2 (09-02-24 @ 20:00), Max: 37.9 (09-02-24 @ 04:00)  HR: 76 (09-02-24 @ 23:20) (52 - 84)  BP: 118/80 (09-02-24 @ 23:00) (116/76 - 181/105)  RR: 11 (09-02-24 @ 23:00) (11 - 14)  SpO2: 100% (09-02-24 @ 23:20) (94% - 100%)  --------------------------------------------------------------------------------------    INS AND OUTS:    09-01-24 @ 07:01  -  09-02-24 @ 07:00  --------------------------------------------------------  IN: 1792.4 mL / OUT: 2365 mL / NET: -572.6 mL    09-02-24 @ 07:01  -  09-03-24 @ 00:21  --------------------------------------------------------  IN: 875.7 mL / OUT: 1200 mL / NET: -324.3 mL      --------------------------------------------------------------------------------------    EXAM    NEURO: NAD, sedated  HEENT: NC/AT   RESPIRATORY: intubated on mechanical ventilation: 12/350/5/30  CARDIO: RRR  ABDOMEN: soft, nontender, nondistended    --------------------------------------------------------------------------------------    LABS                                   8.9    18.40 )-----------( 443      ( 03 Sep 2024 01:10 )             27.3     09-03    144  |  105  |  33<H>  ----------------------------<  158<H>  3.9   |  26  |  1.04    Ca    8.9      03 Sep 2024 01:10  Phos  2.6     09-03  Mg     2.50     09-03    --------------------------------------------------------------------------------------

## 2024-09-03 NOTE — PROGRESS NOTE ADULT - ASSESSMENT
59M with HTN, HLD, unspecified cognitive disorder. Transferred from [facility] with worsening sublingual swelling. Pt seen at [facility] on Aug 26th for the same complaint, bedside I&D of sublingual space done with IV Unasyn. Pt discharged the following day with PO Augmentin, however pt denied taking his PO abx after discharge. Pt unable to eat nor drink much because of the pain and difficulty swallowing and returning to hospital. Patient received OR I&D of sublingual area and experienced considerable swelling in the airway, transferred to SICU intubated to protect airway and vent management.    PLAN:  Neuro  - Sedated: prop/fent, precedex added  - Pain control: tylenol  - home psych meds via KO tube - citalopram, lamictal, lithium, methylphenidate (on hold d/t cant crush)   - QTc 415     Resp  - Intubated 12/350/5/30  - sublingual edema v hematoma.   - 8/31 failed leaked test 1%     Card  - MAP >65  - home statin 10 QD  -  metoprolol 2.5 q6    GI  - Diet: NPO w/ TF via KO       - monitor urine output via magallon  - IVL    Heme  - DVT ppx: lovenox & SCD    ID  - Zosyn (9/1-  - Unasyn (8/28-9/1)  - Bcx from [facility]: streptococcus sensitive to pcn    Endocrine  - Monitor glucose    LINES/TUBES/DRAINS  - ET tube  - A-line  - KO tube    Disposition: SICU 59M with HTN, HLD, unspecified cognitive disorder. Transferred from [facility] with worsening sublingual swelling. Pt seen at [facility] on Aug 26th for the same complaint, bedside I&D of sublingual space done with IV Unasyn. Pt discharged the following day with PO Augmentin, however pt denied taking his PO abx after discharge. Pt unable to eat nor drink much because of the pain and difficulty swallowing and returning to hospital. Patient received OR I&D of sublingual area and experienced considerable swelling in the airway, transferred to SICU intubated to protect airway and vent management.    PLAN:  Neuro  - Sedated: prop/fent, precedex added  - Pain control: tylenol  - home psych meds via KO tube - citalopram, lamictal, lithium, methylphenidate (on hold d/t cant crush)   - Check Li levels  - QTc 415     Resp  - Intubated 12/350/5/30  - sublingual edema v hematoma.   - 9/2 pm passed leak test    Card  - MAP >65  - home statin 10 QD  - metoprolol 2.5 q6    GI  - Diet: NPO i/s/o gastric bubble on last CXR, last BM 8/30   - Dulcolax, senna, miralax for bowel reg      - monitor urine output via Crocker  - IVL    Heme  - DVT ppx: lovenox & SCD    ID  - Zosyn (9/1-  - Unasyn (8/28-9/1)  - Bcx from [facility]: streptococcus sensitive to pcn    Endocrine  - Monitor glucose  - d/c Decadron (last dose 9/2/24)    LINES/TUBES/DRAINS  - ET tube  - A-line  - KO tube    Disposition: SICU 59M with HTN, HLD, unspecified cognitive disorder. Transferred from [facility] with worsening sublingual swelling. Pt seen at [facility] on Aug 26th for the same complaint, bedside I&D of sublingual space done with IV Unasyn. Pt discharged the following day with PO Augmentin, however pt denied taking his PO abx after discharge. Pt unable to eat nor drink much because of the pain and difficulty swallowing and returning to hospital. Patient received OR I&D of sublingual area and experienced considerable swelling in the airway, transferred to SICU intubated to protect airway and vent management.    PLAN:  Neuro  - Sedated: precedex, weaning prop, off fentanyl  - Pain control: tylenol  - home psych meds via NG tube - citalopram, lamictal, lithium, methylphenidate (on hold d/t cant crush)   - Check Li levels 9/4 AM  - QTc 415     Resp  - Intubated 12/350/5/30  - sublingual edema v hematoma.   - 9/3 am passed leak test ~40%  - POCUS for fluid status  - Lasix 20mg diuresis prior to extubation trial with anesthesia today    Card  - MAP >65  - home statin 10 QD  - metoprolol 2.5 q6    GI  - Diet: NPO i/s/o gastric bubble on last CXR  - Dulcolax, senna, miralax for bowel reg  - Patient last BM 9/3 AM (large volume)      - monitor urine output via Crocker  - IVL    Heme  - DVT ppx: lovenox & SCD    ID  - Zosyn (9/1-  - Unasyn (8/28-9/1)  - Bcx from [facility]: streptococcus sensitive to pcn    Endocrine  - Monitor glucose  - d/c Decadron (last dose 9/2/24)    LINES/TUBES/DRAINS  - ET tube  - A-line  - KO tube    Disposition: SICU

## 2024-09-03 NOTE — PROGRESS NOTE ADULT - ATTENDING COMMENTS
I agree with the detailed interval history, physical, and plan, which I have reviewed and edited where appropriate'; also agree with notes/assessment with my team on service.  I have personally examined the patient.  I was physically present for the key portions of the evaluation and management (E/M) service provided.  I reviewed all the pertinent data.  The patient is a critical care patient with life threatening hemodynamic and metabolic instability in SICU.  The SICU team has a constant risk benefit analyzes discussion and coordinating care with the primary team and all consultants.   The patient is in SICU with the chief complaint and diagnosis mentioned in the note.   The plan will be specified in the note.  59M with unspecified cognitive disorder; sp I&D with worsening sublingual abscess in acute respiratory insufficiency in SICU with critical airway and edema.  Cuff leak+.  Sedated  Lung clear  Heart RR  Abd soft  PLAN  Neuro  - dc propofol  -wean precedex  - tylenol  Resp  - weaning trial  -ABG  Card  - MAP >65  GI  - NPO  -NGT    - Lasix+  Heme  -Lovenox  - SCD  -Decadron  ID  - Unasyn   Disposition  - SICU

## 2024-09-03 NOTE — PROGRESS NOTE ADULT - ATTENDING COMMENTS
I agree with the detailed interval history, physical, and plan, which I have reviewed and edited where appropriate'; also agree with notes/assessment with my team on service.  I have personally examined the patient.  I was physically present for the key portions of the evaluation and management (E/M) service provided.  I reviewed all the pertinent data.  The patient is a critical care patient with life threatening hemodynamic and metabolic instability in SICU.  The SICU team has a constant risk benefit analyzes discussion and coordinating care with the primary team and all consultants.   The patient is in SICU with the chief complaint and diagnosis mentioned in the note.   The plan will be specified in the note.  59M with unspecified cognitive disorder; sp I&D with worsening sublingual abscess in acute respiratory insufficiency in SICU with critical airway and edema. Extubated.  awake and alert  Lung clear  Heart RR  Abd soft  PLAN  Neuro  - tylenol  Resp  -ABG  Card  - MAP >65  GI  - NPO  -NGT    - FU Uo  Heme  -Lovenox  - SCD  -DC Decadron  ID  - Unasyn   Disposition  - SICU

## 2024-09-03 NOTE — PROVIDER CONTACT NOTE (CHANGE IN STATUS NOTIFICATION) - BACKGROUND
admit for sublingual swelling. patient extubated today. NGT LWS inserted for large gastric bubble noted in xray as per report recvd by RN this AM. H/H on ABG low though CBC recheck H/H normal.

## 2024-09-03 NOTE — PROGRESS NOTE ADULT - ASSESSMENT
59M with HTN, HLD, unspecified cognitive disorder. POD s/p I&D sublingual abscess, progressing well in SICU. Improved swelling and FOM soft, non tender.    PLAN:  - continue tube feeds  - Multimodal pain management   - Extubation per SICU requirements

## 2024-09-03 NOTE — PROVIDER CONTACT NOTE (CHANGE IN STATUS NOTIFICATION) - ASSESSMENT
pt appears to be lethargic though arousable. tachycardic, EKG done,sinus tachycardic. hypertensive. no respiratory distress.

## 2024-09-03 NOTE — PROGRESS NOTE ADULT - ASSESSMENT
59M with HTN, HLD, unspecified cognitive disorder. Transferred from [facility] with worsening sublingual swelling. Pt seen at [facility] on Aug 26th for the same complaint, bedside I&D of sublingual space done with IV Unasyn. Pt discharged the following day with PO Augmentin, however pt denied taking his PO abx after discharge. Pt unable to eat nor drink much because of the pain and difficulty swallowing and returning to hospital. Patient received OR I&D of sublingual area and experienced considerable swelling in the airway, transferred to SICU intubated to protect airway and vent management.    PLAN:  Neuro  - Sedated: precedex, weaning prop, off fentanyl  - Pain control: tylenol  - home psych meds via NG tube - citalopram, lamictal, lithium, methylphenidate (on hold d/t cant crush)   - Check Li levels 9/4 AM  - QTc 415     Resp  - Intubated 12/350/5/30  - sublingual edema v hematoma.   - 9/3 am passed leak test ~40%  - POCUS for fluid status  - Lasix 20mg diuresis prior to extubation trial with anesthesia today    Card  - MAP >65  - home statin 10 QD  - metoprolol 2.5 q6    GI  - Diet: NPO i/s/o gastric bubble on last CXR  - Dulcolax, senna, miralax for bowel reg  - Patient last BM 9/3 AM (large volume)      - monitor urine output via Crocker  - IVL    Heme  - DVT ppx: lovenox & SCD    ID  - Zosyn (9/1-  - Unasyn (8/28-9/1)  - Bcx from [facility]: streptococcus sensitive to pcn    Endocrine  - Monitor glucose  - d/c Decadron (last dose 9/2/24)    LINES/TUBES/DRAINS  - ET tube  - A-line  - KO tube    Disposition: SICU 59M with HTN, HLD, unspecified cognitive disorder. Transferred from [facility] with worsening sublingual swelling. Pt seen at [facility] on Aug 26th for the same complaint, bedside I&D of sublingual space done with IV Unasyn. Pt discharged the following day with PO Augmentin, however pt denied taking his PO abx after discharge. Pt unable to eat nor drink much because of the pain and difficulty swallowing and returning to hospital. Patient received OR I&D of sublingual area and experienced considerable swelling in the airway, transferred to SICU intubated to protect airway and vent management.    PLAN:  Neuro  - Off sedation, s/p precedex, propofol, fentanyl  - Pain control: tylenol  - home psych meds via NG tube - citalopram, lamictal, lithium, methylphenidate (on hold d/t cant crush)   - Check Li levels 9/4 AM  - QTc 415     Resp  - s/p intubation for sublingual edema v hematoma.   - POCUS for fluid status  - s/p Lasix 20mg prior to extubation  - Extubated to non-rebreather 9/3, satting well at 10L.       Card  - MAP >65  - home statin 10 QD  - metoprolol 2.5 q6    GI  - Diet: NPO i/s/o gastric bubble on last CXR  - Dulcolax, senna, miralax for bowel reg  - Patient last BM 9/3 AM (large volume)      - monitor urine output via Crocker  - IVL    Heme  - DVT ppx: lovenox & SCD    ID  - Zosyn (9/1-  - Unasyn (8/28-9/1)  - Bcx from [facility]: streptococcus sensitive to pcn    Endocrine  - Monitor glucose  - d/c Decadron (last dose 9/2/24)    LINES/TUBES/DRAINS  - ET tube  - A-line  - KO tube    Disposition: SICU 59M with HTN, HLD, unspecified cognitive disorder. Transferred from [facility] with worsening sublingual swelling. Pt seen at [facility] on Aug 26th for the same complaint, bedside I&D of sublingual space done with IV Unasyn. Pt discharged the following day with PO Augmentin, however pt denied taking his PO abx after discharge. Pt unable to eat nor drink much because of the pain and difficulty swallowing and returning to hospital. Patient received OR I&D of sublingual area and experienced considerable swelling in the airway, transferred to SICU intubated to protect airway and vent management.    PLAN:  Neuro  - Off sedation, s/p precedex, propofol, fentanyl  - Pain control: tylenol  - home psych meds via NG tube - citalopram, lamictal, lithium, methylphenidate (on hold d/t cant crush)   - Check Li levels 9/4 AM  - QTc 415     Resp  - s/p intubation for sublingual edema v hematoma.   - POCUS for fluid status  - s/p Lasix 20mg prior to extubation  - Extubated to non-rebreather 9/3, satting well at 10L.       Card  - MAP >65  - home statin 10 QD  - metoprolol 2.5 q6    GI  - Diet: NPO i/s/o gastric bubble on last CXR  - Dulcolax, senna, miralax for bowel reg  - Patient last BM 9/3 AM (large volume)      - monitor urine output via Crocker  - IVL    Heme  - DVT ppx: lovenox & SCD    ID  - Zosyn (9/1-  - Unasyn (8/28-9/1)  - Bcx from [facility]: streptococcus sensitive to pcn    Endocrine  - Monitor glucose  - d/c Decadron (last dose 9/2/24)    LINES/TUBES/DRAINS  - A-line  - NG tube    Disposition: SICU

## 2024-09-03 NOTE — PROVIDER CONTACT NOTE (CHANGE IN STATUS NOTIFICATION) - ACTION/TREATMENT ORDERED:
stated okay to give lovenox. no stool specimen send. and MD verified placement ofNGT on xray. and came to bedside to assess stool. bl cx ordered. no further interventions

## 2024-09-03 NOTE — PROGRESS NOTE ADULT - SUBJECTIVE AND OBJECTIVE BOX
59M with HTN, HLD, unspecified cognitive disorder. POD4 s/p I&D sublingual abscess.    Interval: NAEON, failed leak test 1, 5, 3% on multiple attempts, switchd from unasyn to zosyn, magallon removed and retained so magallon was reinserted    General: WD, thin, intubated on mechanical ventilation, sedated on prop and fent, NAD  H&N:  lower facial soft swelling improved, inferior border of the mandible fully palpable, no active purulence from the magallon drain   IO: FOM induration soft swelling improved, no signs of hematoma    Vitals:     Vital Signs Last 24 Hrs  T(C): 37.6 (03 Sep 2024 04:00), Max: 37.8 (02 Sep 2024 16:00)  T(F): 99.7 (03 Sep 2024 04:00), Max: 100 (02 Sep 2024 16:00)  HR: 64 (03 Sep 2024 07:00) (55 - 84)  BP: 115/77 (03 Sep 2024 07:00) (101/71 - 181/105)  BP(mean): 90 (03 Sep 2024 07:00) (79 - 125)  RR: 14 (03 Sep 2024 07:00) (11 - 15)  SpO2: 100% (03 Sep 2024 07:00) (99% - 100%)    Parameters below as of 03 Sep 2024 06:00  Patient On (Oxygen Delivery Method): ventilator        I&O's Detail    02 Sep 2024 07:01  -  03 Sep 2024 07:00  --------------------------------------------------------  IN:    Dexmedetomidine: 104.5 mL    FentaNYL: 27.4 mL    FentaNYL: 226.6 mL    Glucerna 1.5: 672 mL    IV PiggyBack: 100 mL    Propofol: 219.4 mL  Total IN: 1349.9 mL    OUT:    Indwelling Catheter - Urethral (mL): 1720 mL  Total OUT: 1720 mL    Total NET: -370.1 mL          Medications:    MEDICATIONS  (STANDING):  acetaminophen   Oral Liquid .. 650 milliGRAM(s) Oral every 6 hours  atorvastatin 10 milliGRAM(s) Oral with breakfast  chlorhexidine 0.12% Liquid 15 milliLiter(s) Oral Mucosa every 12 hours  chlorhexidine 2% Cloths 1 Application(s) Topical daily  citalopram 30 milliGRAM(s) Oral with breakfast  dexMEDEtomidine Infusion 0.05 MICROgram(s)/kG/Hr (0.57 mL/Hr) IV Continuous <Continuous>  enoxaparin Injectable 40 milliGRAM(s) SubCutaneous every 24 hours  fentaNYL   Infusion 0.5 MICROgram(s)/kG/Hr (2.29 mL/Hr) IV Continuous <Continuous>  lamoTRIgine 100 milliGRAM(s) Oral at bedtime  lithium 600 milliGRAM(s) Oral at bedtime  methylphenidate ER (CONCERTA) 72 milliGRAM(s) Oral every morning  metoprolol tartrate 12.5 milliGRAM(s) Oral two times a day  piperacillin/tazobactam IVPB.. 3.375 Gram(s) IV Intermittent every 8 hours  polyethylene glycol 3350 17 Gram(s) Oral daily  propofol Infusion 40.029 MICROgram(s)/kG/Min (11 mL/Hr) IV Continuous <Continuous>  QUEtiapine 100 milliGRAM(s) Oral at bedtime  senna 2 Tablet(s) Oral at bedtime    MEDICATIONS  (PRN):  HYDROmorphone  Injectable 0.5 milliGRAM(s) IV Push every 4 hours PRN Moderate Pain (4 - 6)  HYDROmorphone  Injectable 1 milliGRAM(s) IV Push every 4 hours PRN Severe Pain (7 - 10)      Labs:                          8.9    18.40 )-----------( 443      ( 03 Sep 2024 01:10 )             27.3       09-03    144  |  105  |  33<H>  ----------------------------<  158<H>  3.9   |  26  |  1.04    Ca    8.9      03 Sep 2024 01:10  Phos  2.6     09-03  Mg     2.50     09-03

## 2024-09-04 LAB
ANION GAP SERPL CALC-SCNC: 10 MMOL/L — SIGNIFICANT CHANGE UP (ref 7–14)
ANION GAP SERPL CALC-SCNC: 13 MMOL/L — SIGNIFICANT CHANGE UP (ref 7–14)
BASOPHILS # BLD AUTO: 0.02 K/UL — SIGNIFICANT CHANGE UP (ref 0–0.2)
BASOPHILS NFR BLD AUTO: 0.1 % — SIGNIFICANT CHANGE UP (ref 0–2)
BUN SERPL-MCNC: 30 MG/DL — HIGH (ref 7–23)
BUN SERPL-MCNC: 35 MG/DL — HIGH (ref 7–23)
CALCIUM SERPL-MCNC: 8.8 MG/DL — SIGNIFICANT CHANGE UP (ref 8.4–10.5)
CALCIUM SERPL-MCNC: 9.3 MG/DL — SIGNIFICANT CHANGE UP (ref 8.4–10.5)
CHLORIDE SERPL-SCNC: 109 MMOL/L — HIGH (ref 98–107)
CHLORIDE SERPL-SCNC: 112 MMOL/L — HIGH (ref 98–107)
CO2 SERPL-SCNC: 22 MMOL/L — SIGNIFICANT CHANGE UP (ref 22–31)
CO2 SERPL-SCNC: 24 MMOL/L — SIGNIFICANT CHANGE UP (ref 22–31)
CREAT SERPL-MCNC: 1.11 MG/DL — SIGNIFICANT CHANGE UP (ref 0.5–1.3)
CREAT SERPL-MCNC: 1.12 MG/DL — SIGNIFICANT CHANGE UP (ref 0.5–1.3)
CULTURE RESULTS: ABNORMAL
EGFR: 75 ML/MIN/1.73M2 — SIGNIFICANT CHANGE UP
EGFR: 76 ML/MIN/1.73M2 — SIGNIFICANT CHANGE UP
EOSINOPHIL # BLD AUTO: 0.05 K/UL — SIGNIFICANT CHANGE UP (ref 0–0.5)
EOSINOPHIL NFR BLD AUTO: 0.2 % — SIGNIFICANT CHANGE UP (ref 0–6)
FOLATE SERPL-MCNC: >20 NG/ML — HIGH (ref 3.1–17.5)
GLUCOSE SERPL-MCNC: 125 MG/DL — HIGH (ref 70–99)
GLUCOSE SERPL-MCNC: 141 MG/DL — HIGH (ref 70–99)
HCT VFR BLD CALC: 19.6 % — CRITICAL LOW (ref 39–50)
HCT VFR BLD CALC: 20.7 % — CRITICAL LOW (ref 39–50)
HCT VFR BLD CALC: 23.9 % — LOW (ref 39–50)
HCT VFR BLD CALC: 24.7 % — LOW (ref 39–50)
HGB BLD-MCNC: 6.7 G/DL — CRITICAL LOW (ref 13–17)
HGB BLD-MCNC: 6.7 G/DL — CRITICAL LOW (ref 13–17)
HGB BLD-MCNC: 8.2 G/DL — LOW (ref 13–17)
HGB BLD-MCNC: 8.3 G/DL — LOW (ref 13–17)
IANC: 21.67 K/UL — HIGH (ref 1.8–7.4)
IMM GRANULOCYTES NFR BLD AUTO: 1.1 % — HIGH (ref 0–0.9)
LITHIUM SERPL-MCNC: 0.6 MMOL/L — SIGNIFICANT CHANGE UP (ref 0.6–1.2)
LYMPHOCYTES # BLD AUTO: 1.48 K/UL — SIGNIFICANT CHANGE UP (ref 1–3.3)
LYMPHOCYTES # BLD AUTO: 6 % — LOW (ref 13–44)
MAGNESIUM SERPL-MCNC: 2.3 MG/DL — SIGNIFICANT CHANGE UP (ref 1.6–2.6)
MAGNESIUM SERPL-MCNC: 2.3 MG/DL — SIGNIFICANT CHANGE UP (ref 1.6–2.6)
MCHC RBC-ENTMCNC: 32.4 GM/DL — SIGNIFICANT CHANGE UP (ref 32–36)
MCHC RBC-ENTMCNC: 32.4 PG — SIGNIFICANT CHANGE UP (ref 27–34)
MCHC RBC-ENTMCNC: 33.6 GM/DL — SIGNIFICANT CHANGE UP (ref 32–36)
MCHC RBC-ENTMCNC: 34.2 GM/DL — SIGNIFICANT CHANGE UP (ref 32–36)
MCHC RBC-ENTMCNC: 34.3 GM/DL — SIGNIFICANT CHANGE UP (ref 32–36)
MCHC RBC-ENTMCNC: 35 PG — HIGH (ref 27–34)
MCHC RBC-ENTMCNC: 35.1 PG — HIGH (ref 27–34)
MCHC RBC-ENTMCNC: 38.1 PG — HIGH (ref 27–34)
MCV RBC AUTO: 102.6 FL — HIGH (ref 80–100)
MCV RBC AUTO: 104.2 FL — HIGH (ref 80–100)
MCV RBC AUTO: 117.6 FL — HIGH (ref 80–100)
MCV RBC AUTO: 94.5 FL — SIGNIFICANT CHANGE UP (ref 80–100)
MONOCYTES # BLD AUTO: 1.37 K/UL — HIGH (ref 0–0.9)
MONOCYTES NFR BLD AUTO: 5.5 % — SIGNIFICANT CHANGE UP (ref 2–14)
NEUTROPHILS # BLD AUTO: 21.67 K/UL — HIGH (ref 1.8–7.4)
NEUTROPHILS NFR BLD AUTO: 87.1 % — HIGH (ref 43–77)
NRBC # BLD: 0 /100 WBCS — SIGNIFICANT CHANGE UP (ref 0–0)
NRBC # FLD: 0.04 K/UL — HIGH (ref 0–0)
NRBC # FLD: 0.06 K/UL — HIGH (ref 0–0)
NRBC # FLD: 0.06 K/UL — HIGH (ref 0–0)
NRBC # FLD: 0.07 K/UL — HIGH (ref 0–0)
PHOSPHATE SERPL-MCNC: 2.8 MG/DL — SIGNIFICANT CHANGE UP (ref 2.5–4.5)
PHOSPHATE SERPL-MCNC: 2.9 MG/DL — SIGNIFICANT CHANGE UP (ref 2.5–4.5)
PLATELET # BLD AUTO: 235 K/UL — SIGNIFICANT CHANGE UP (ref 150–400)
PLATELET # BLD AUTO: 292 K/UL — SIGNIFICANT CHANGE UP (ref 150–400)
PLATELET # BLD AUTO: 322 K/UL — SIGNIFICANT CHANGE UP (ref 150–400)
PLATELET # BLD AUTO: 412 K/UL — HIGH (ref 150–400)
POTASSIUM SERPL-MCNC: 4.1 MMOL/L — SIGNIFICANT CHANGE UP (ref 3.5–5.3)
POTASSIUM SERPL-MCNC: 4.3 MMOL/L — SIGNIFICANT CHANGE UP (ref 3.5–5.3)
POTASSIUM SERPL-SCNC: 4.1 MMOL/L — SIGNIFICANT CHANGE UP (ref 3.5–5.3)
POTASSIUM SERPL-SCNC: 4.3 MMOL/L — SIGNIFICANT CHANGE UP (ref 3.5–5.3)
RBC # BLD: 1.76 M/UL — LOW (ref 4.2–5.8)
RBC # BLD: 1.91 M/UL — LOW (ref 4.2–5.8)
RBC # BLD: 2.37 M/UL — LOW (ref 4.2–5.8)
RBC # BLD: 2.53 M/UL — LOW (ref 4.2–5.8)
RBC # FLD: 12.8 % — SIGNIFICANT CHANGE UP (ref 10.3–14.5)
RBC # FLD: 24.6 % — HIGH (ref 10.3–14.5)
RBC # FLD: SIGNIFICANT CHANGE UP (ref 10.3–14.5)
RBC # FLD: SIGNIFICANT CHANGE UP (ref 10.3–14.5)
SODIUM SERPL-SCNC: 144 MMOL/L — SIGNIFICANT CHANGE UP (ref 135–145)
SODIUM SERPL-SCNC: 146 MMOL/L — HIGH (ref 135–145)
SPECIMEN SOURCE: SIGNIFICANT CHANGE UP
VIT B12 SERPL-MCNC: >2000 PG/ML — HIGH (ref 200–900)
WBC # BLD: 21.3 K/UL — HIGH (ref 3.8–10.5)
WBC # BLD: 24.86 K/UL — HIGH (ref 3.8–10.5)
WBC # BLD: 25.41 K/UL — HIGH (ref 3.8–10.5)
WBC # BLD: 30.12 K/UL — HIGH (ref 3.8–10.5)
WBC # FLD AUTO: 21.3 K/UL — HIGH (ref 3.8–10.5)
WBC # FLD AUTO: 24.86 K/UL — HIGH (ref 3.8–10.5)
WBC # FLD AUTO: 25.41 K/UL — HIGH (ref 3.8–10.5)
WBC # FLD AUTO: 30.12 K/UL — HIGH (ref 3.8–10.5)

## 2024-09-04 PROCEDURE — 99223 1ST HOSP IP/OBS HIGH 75: CPT

## 2024-09-04 PROCEDURE — 99291 CRITICAL CARE FIRST HOUR: CPT

## 2024-09-04 PROCEDURE — 99292 CRITICAL CARE ADDL 30 MIN: CPT

## 2024-09-04 PROCEDURE — 76604 US EXAM CHEST: CPT | Mod: 26

## 2024-09-04 PROCEDURE — 71045 X-RAY EXAM CHEST 1 VIEW: CPT | Mod: 26

## 2024-09-04 PROCEDURE — 76700 US EXAM ABDOM COMPLETE: CPT | Mod: 26

## 2024-09-04 RX ORDER — METOPROLOL TARTRATE 100 MG/1
5 TABLET ORAL ONCE
Refills: 0 | Status: COMPLETED | OUTPATIENT
Start: 2024-09-04 | End: 2024-09-04

## 2024-09-04 RX ORDER — FLUCONAZOLE 150 MG/1
200 TABLET ORAL EVERY 24 HOURS
Refills: 0 | Status: DISCONTINUED | OUTPATIENT
Start: 2024-09-04 | End: 2024-09-04

## 2024-09-04 RX ORDER — METHYLPHENIDATE HYDROCHLORIDE 72 MG/1
72 TABLET, EXTENDED RELEASE ORAL EVERY MORNING
Refills: 0 | Status: DISCONTINUED | OUTPATIENT
Start: 2024-09-04 | End: 2024-09-08

## 2024-09-04 RX ORDER — PANTOPRAZOLE SODIUM 40 MG
40 TABLET, DELAYED RELEASE (ENTERIC COATED) ORAL
Refills: 0 | Status: DISCONTINUED | OUTPATIENT
Start: 2024-09-04 | End: 2024-09-10

## 2024-09-04 RX ORDER — AMLODIPINE BESYLATE 10 MG/1
5 TABLET ORAL DAILY
Refills: 0 | Status: DISCONTINUED | OUTPATIENT
Start: 2024-09-04 | End: 2024-09-10

## 2024-09-04 RX ORDER — METOPROLOL TARTRATE 100 MG/1
25 TABLET ORAL
Refills: 0 | Status: DISCONTINUED | OUTPATIENT
Start: 2024-09-04 | End: 2024-09-10

## 2024-09-04 RX ORDER — HYDRALAZINE HCL 50 MG
5 TABLET ORAL ONCE
Refills: 0 | Status: COMPLETED | OUTPATIENT
Start: 2024-09-04 | End: 2024-09-04

## 2024-09-04 RX ORDER — FLUCONAZOLE 150 MG/1
200 TABLET ORAL EVERY 24 HOURS
Refills: 0 | Status: DISCONTINUED | OUTPATIENT
Start: 2024-09-05 | End: 2024-09-10

## 2024-09-04 RX ORDER — FLUCONAZOLE 150 MG/1
400 TABLET ORAL ONCE
Refills: 0 | Status: COMPLETED | OUTPATIENT
Start: 2024-09-04 | End: 2024-09-04

## 2024-09-04 RX ORDER — METOPROLOL TARTRATE 100 MG/1
25 TABLET ORAL ONCE
Refills: 0 | Status: COMPLETED | OUTPATIENT
Start: 2024-09-04 | End: 2024-09-04

## 2024-09-04 RX ADMIN — ACETAMINOPHEN 400 MILLIGRAM(S): 325 TABLET ORAL at 05:14

## 2024-09-04 RX ADMIN — Medication 10 MILLIGRAM(S): at 09:15

## 2024-09-04 RX ADMIN — ACETAMINOPHEN 400 MILLIGRAM(S): 325 TABLET ORAL at 11:46

## 2024-09-04 RX ADMIN — Medication 125 MILLILITER(S): at 12:30

## 2024-09-04 RX ADMIN — ACETAMINOPHEN 1000 MILLIGRAM(S): 325 TABLET ORAL at 01:12

## 2024-09-04 RX ADMIN — Medication 5 MILLIGRAM(S): at 05:57

## 2024-09-04 RX ADMIN — AMLODIPINE BESYLATE 5 MILLIGRAM(S): 10 TABLET ORAL at 09:50

## 2024-09-04 RX ADMIN — HYDROMORPHONE HYDROCHLORIDE 0.5 MILLIGRAM(S): 2 TABLET ORAL at 02:18

## 2024-09-04 RX ADMIN — METOPROLOL TARTRATE 25 MILLIGRAM(S): 100 TABLET ORAL at 10:34

## 2024-09-04 RX ADMIN — METOPROLOL TARTRATE 5 MILLIGRAM(S): 100 TABLET ORAL at 05:14

## 2024-09-04 RX ADMIN — FLUCONAZOLE 50 MILLIGRAM(S): 150 TABLET ORAL at 11:48

## 2024-09-04 RX ADMIN — Medication 125 MILLILITER(S): at 11:48

## 2024-09-04 RX ADMIN — CHLORHEXIDINE GLUCONATE 1 APPLICATION(S): 40 SOLUTION TOPICAL at 11:48

## 2024-09-04 RX ADMIN — METOPROLOL TARTRATE 5 MILLIGRAM(S): 100 TABLET ORAL at 21:34

## 2024-09-04 RX ADMIN — LITHIUM CARBONATE 600 MILLIGRAM(S): 150 CAPSULE ORAL at 21:34

## 2024-09-04 RX ADMIN — HYDROMORPHONE HYDROCHLORIDE 0.5 MILLIGRAM(S): 2 TABLET ORAL at 02:03

## 2024-09-04 RX ADMIN — Medication 40 MILLIGRAM(S): at 17:56

## 2024-09-04 RX ADMIN — ACETAMINOPHEN 1000 MILLIGRAM(S): 325 TABLET ORAL at 11:46

## 2024-09-04 RX ADMIN — PIPERACILLIN SODIUM AND TAZOBACTAM SODIUM 25 GRAM(S): 3; .375 INJECTION, POWDER, FOR SOLUTION INTRAVENOUS at 21:35

## 2024-09-04 RX ADMIN — PIPERACILLIN SODIUM AND TAZOBACTAM SODIUM 25 GRAM(S): 3; .375 INJECTION, POWDER, FOR SOLUTION INTRAVENOUS at 05:14

## 2024-09-04 RX ADMIN — Medication 30 MILLIGRAM(S): at 11:51

## 2024-09-04 RX ADMIN — PIPERACILLIN SODIUM AND TAZOBACTAM SODIUM 25 GRAM(S): 3; .375 INJECTION, POWDER, FOR SOLUTION INTRAVENOUS at 14:28

## 2024-09-04 RX ADMIN — ACETAMINOPHEN 1000 MILLIGRAM(S): 325 TABLET ORAL at 05:29

## 2024-09-04 RX ADMIN — Medication 100 MILLIGRAM(S): at 21:35

## 2024-09-04 RX ADMIN — LAMOTRIGINE 100 MILLIGRAM(S): 100 TABLET, EXTENDED RELEASE ORAL at 21:35

## 2024-09-04 RX ADMIN — ACETAMINOPHEN 400 MILLIGRAM(S): 325 TABLET ORAL at 00:57

## 2024-09-04 RX ADMIN — METOPROLOL TARTRATE 25 MILLIGRAM(S): 100 TABLET ORAL at 18:00

## 2024-09-04 NOTE — PROGRESS NOTE ADULT - SUBJECTIVE AND OBJECTIVE BOX
ORAL & MAXILLOFACIAL SURGERY PROGRESS NOTE    Periapical abscess without sinus tract        NAE HERNANDEZ  |  [id number]      Patient is a 59y Male w/ pmhx of HTN, HLD, unspeficied cognitive disorder. POD5 s/p I&D sublingual abscess.       S: Tachycardic, hypertensive lopressor 5mg pushed. Darker stool found. CBC to be repeated. NGT suction output bilious, non bloody.     MEDICATIONS  (STANDING):  acetaminophen   IVPB .. 1000 milliGRAM(s) IV Intermittent every 6 hours  atorvastatin 10 milliGRAM(s) Oral with breakfast  chlorhexidine 2% Cloths 1 Application(s) Topical daily  citalopram 30 milliGRAM(s) Oral with breakfast  enoxaparin Injectable 40 milliGRAM(s) SubCutaneous every 24 hours  lactated ringers. 1000 milliLiter(s) (100 mL/Hr) IV Continuous <Continuous>  lamoTRIgine 100 milliGRAM(s) Oral at bedtime  lithium 600 milliGRAM(s) Oral at bedtime  metoprolol tartrate 25 milliGRAM(s) Oral two times a day  pantoprazole  Injectable 40 milliGRAM(s) IV Push two times a day  piperacillin/tazobactam IVPB.. 3.375 Gram(s) IV Intermittent every 8 hours  QUEtiapine 100 milliGRAM(s) Oral at bedtime    MEDICATIONS  (PRN):      O:   Vital Signs Last 24 Hrs  T(C): 37.4 (04 Sep 2024 04:00), Max: 38.5 (03 Sep 2024 15:00)  T(F): 99.4 (04 Sep 2024 04:00), Max: 101.3 (03 Sep 2024 15:00)  HR: 72 (04 Sep 2024 06:00) (52 - 136)  BP: 173/99 (04 Sep 2024 06:00) (115/77 - 193/97)  BP(mean): 118 (04 Sep 2024 06:00) (90 - 128)  RR: 20 (04 Sep 2024 06:00) (8 - 28)  SpO2: 100% (04 Sep 2024 06:00) (92% - 100%)    Parameters below as of 04 Sep 2024 06:00  Patient On (Oxygen Delivery Method): nasal cannula  O2 Flow (L/min): 2      PHYSICAL EXAM:  GENERAL: NAD, thin.    HEENT: Facial swelling improved, inferior border of mandible palpable. Dry Heme inferior to the anterior border of the mandible.  CHEST/LUNG: Breathing even, unlabored  GI: NGT in place  HEART: Tachycardic, htn,   ABDOMEN: Soft, nontender, nondistender, +NGT. Incision C/D/I. Rectal tube in place.                             6.7    30.12 )-----------( 412      ( 03 Sep 2024 23:30 )             20.7     09-03    146<H>  |  109<H>  |  35<H>  ----------------------------<  141<H>  4.1   |  24  |  1.11    Ca    8.8      03 Sep 2024 23:30  Phos  2.8     09-03  Mg     2.30     09-03 09-02-24 @ 07:01  -  09-03-24 @ 07:00  --------------------------------------------------------  IN: 1349.9 mL / OUT: 1720 mL / NET: -370.1 mL    09-03-24 @ 07:01  -  09-04-24 @ 06:52  --------------------------------------------------------  IN: 2139 mL / OUT: 4465 mL / NET: -2326 mL         ORAL & MAXILLOFACIAL SURGERY PROGRESS NOTE    Periapical abscess without sinus tract        NAE HERNANDEZ  |  [id number]      Patient is a 59y Male w/ pmhx of HTN, HLD, unspeficied cognitive disorder. POD5 s/p I&D sublingual abscess.       S: Tachycardic, hypertensive lopressor 5mg pushed. Darker stool found. CBC to be repeated. NGT suction output bilious, non bloody. Given 1u pRBC. CXR noted large gas bubble.    MEDICATIONS  (STANDING):  acetaminophen   IVPB .. 1000 milliGRAM(s) IV Intermittent every 6 hours  atorvastatin 10 milliGRAM(s) Oral with breakfast  chlorhexidine 2% Cloths 1 Application(s) Topical daily  citalopram 30 milliGRAM(s) Oral with breakfast  enoxaparin Injectable 40 milliGRAM(s) SubCutaneous every 24 hours  lactated ringers. 1000 milliLiter(s) (100 mL/Hr) IV Continuous <Continuous>  lamoTRIgine 100 milliGRAM(s) Oral at bedtime  lithium 600 milliGRAM(s) Oral at bedtime  metoprolol tartrate 25 milliGRAM(s) Oral two times a day  pantoprazole  Injectable 40 milliGRAM(s) IV Push two times a day  piperacillin/tazobactam IVPB.. 3.375 Gram(s) IV Intermittent every 8 hours  QUEtiapine 100 milliGRAM(s) Oral at bedtime    MEDICATIONS  (PRN):      O:   Vital Signs Last 24 Hrs  T(C): 37.4 (04 Sep 2024 04:00), Max: 38.5 (03 Sep 2024 15:00)  T(F): 99.4 (04 Sep 2024 04:00), Max: 101.3 (03 Sep 2024 15:00)  HR: 72 (04 Sep 2024 06:00) (52 - 136)  BP: 173/99 (04 Sep 2024 06:00) (115/77 - 193/97)  BP(mean): 118 (04 Sep 2024 06:00) (90 - 128)  RR: 20 (04 Sep 2024 06:00) (8 - 28)  SpO2: 100% (04 Sep 2024 06:00) (92% - 100%)    Parameters below as of 04 Sep 2024 06:00  Patient On (Oxygen Delivery Method): nasal cannula  O2 Flow (L/min): 2      PHYSICAL EXAM:  GENERAL: NAD, thin.    HEENT: Facial swelling improved, inferior border of mandible palpable. Dry Heme inferior to the anterior border of the mandible. Submental region and neck edema stable. FOM and base of tongue slightly indurated. FOM hemostatic w/ sutures intact. No intraoral purulence expressed.   CHEST/LUNG: Breathing even, unlabored  GI: NGT in place  HEART: Tachycardic, htn,   ABDOMEN: Soft, nontender, nondistender, +NGT. Incision C/D/I. Rectal tube in place.                             6.7    30.12 )-----------( 412      ( 03 Sep 2024 23:30 )             20.7     09-03    146<H>  |  109<H>  |  35<H>  ----------------------------<  141<H>  4.1   |  24  |  1.11    Ca    8.8      03 Sep 2024 23:30  Phos  2.8     09-03  Mg     2.30     09-03 09-02-24 @ 07:01  -  09-03-24 @ 07:00  --------------------------------------------------------  IN: 1349.9 mL / OUT: 1720 mL / NET: -370.1 mL    09-03-24 @ 07:01  -  09-04-24 @ 06:52  --------------------------------------------------------  IN: 2139 mL / OUT: 4465 mL / NET: -2326 mL

## 2024-09-04 NOTE — PROGRESS NOTE ADULT - SUBJECTIVE AND OBJECTIVE BOX
SICU Daily Progress Note  =====================================================  Interval/Events:  - GI consulted, recommending endoscopy for concern GI bleed  - repeat afternoon CBC  - TOV  - amlodipine 5mg added for HTN in addition to meto 25mg BID  - fluconazole 200mg q24 added  - Pt removed NGT, given restored bowel function will receive SLP consult    ALLERGIES:  No Known Allergies      --------------------------------------------------------------------------------------    MEDICATIONS:    Neurologic Medications  citalopram 30 milliGRAM(s) Oral with breakfast  lamoTRIgine 100 milliGRAM(s) Oral at bedtime  lithium 600 milliGRAM(s) Oral at bedtime  methylphenidate ER (CONCERTA) 72 milliGRAM(s) Oral every morning  QUEtiapine 100 milliGRAM(s) Oral at bedtime    Respiratory Medications    Cardiovascular Medications  amLODIPine   Tablet 5 milliGRAM(s) Oral daily  metoprolol tartrate 25 milliGRAM(s) Oral two times a day    Gastrointestinal Medications  dextrose 5% + sodium chloride 0.45% 1000 milliLiter(s) IV Continuous <Continuous>  pantoprazole  Injectable 40 milliGRAM(s) IV Push two times a day    Genitourinary Medications    Hematologic/Oncologic Medications  enoxaparin Injectable 40 milliGRAM(s) SubCutaneous every 24 hours    Antimicrobial/Immunologic Medications  piperacillin/tazobactam IVPB.. 3.375 Gram(s) IV Intermittent every 8 hours    Endocrine/Metabolic Medications  atorvastatin 10 milliGRAM(s) Oral with breakfast    Topical/Other Medications  chlorhexidine 2% Cloths 1 Application(s) Topical daily    --------------------------------------------------------------------------------------    VITAL SIGNS:  T(C): 36.9 (09-04-24 @ 07:00), Max: 38.5 (09-03-24 @ 15:00)  HR: 77 (09-04-24 @ 10:00) (72 - 130)  BP: 154/90 (09-04-24 @ 10:00) (140/97 - 193/97)  RR: 21 (09-04-24 @ 10:00) (15 - 24)  SpO2: 100% (09-04-24 @ 10:00) (93% - 100%)  --------------------------------------------------------------------------------------    INS AND OUTS:    09-03-24 @ 07:01  -  09-04-24 @ 07:00  --------------------------------------------------------  IN: 2239 mL / OUT: 4465 mL / NET: -2226 mL    09-04-24 @ 07:01  -  09-04-24 @ 12:20  --------------------------------------------------------  IN: 100 mL / OUT: 755 mL / NET: -655 mL      --------------------------------------------------------------------------------------    EXAM    NEURO: NAD,   HEENT: NC/AT  RESPIRATORY: nonlabored respirations, normal CW expansion  CARDIO: RRR, S1S2  ABDOMEN: soft, nontender, nondistended, +/- NGT, ostomy, surgical dressing c/d/i, TAQUERIA drain output  EXTREMITIES: normal strength, no deformities    -------------------------------------------------------------------------------------  LABS                        8.3    25.41 )-----------( 322      ( 04 Sep 2024 06:20 )             24.7     09-04    144  |  112<H>  |  30<H>  ----------------------------<  125<H>  4.3   |  22  |  1.12    Ca    9.3      04 Sep 2024 06:48  Phos  2.9     09-04  Mg     2.30     09-04        Urinalysis Basic - ( 04 Sep 2024 06:48 )    Color: x / Appearance: x / SG: x / pH: x  Gluc: 125 mg/dL / Ketone: x  / Bili: x / Urobili: x   Blood: x / Protein: x / Nitrite: x   Leuk Esterase: x / RBC: x / WBC x   Sq Epi: x / Non Sq Epi: x / Bacteria: x      --------------------------------------------------------------------------------------

## 2024-09-04 NOTE — PHYSICAL THERAPY INITIAL EVALUATION ADULT - ADDITIONAL COMMENTS
?poor historian. Pt stated he lives alone in a house, +stairs. prior to admission Pt stated he was independent with all mobility and ambulated without an assistive device.   Per care coordination assessment on 9/3/24, Pt lives with his spouse in a house, +1 flight of stairs inside.     Pt. left comfortable in bed with all tubes/lines intact, head of the bed elevated, call bell in reach and in NAD. RN aware of session and pt current position.

## 2024-09-04 NOTE — CONSULT NOTE ADULT - ASSESSMENT
59M with HTN, HLD, unspecified cognitive disorder, transferred from [facility] with worsening sublingual swelling. Patient sp OR for I&D of sublingual area 8/29 (concern for posterior airway swelling with possible hematoma, left intubated for airway protection) and transferred to SICU for vent management. 9/3 patient extubated. Course c/b bloody stool and GI consulted for further evaluation.     Febrile to 101 yesterday afternoon, currently afebrile, hr 72, bp 173/99, on NC. Labs: wbc ~25k, on admission with macrocytic anemia with initial hgb 9.9, hgb since flux ~7-8 range, last night hgb drop to 6.7 from 8.6 prior, sp 1u prbc-- over responded to 8.3, bun/cr ratio = 32-- 27 today    # dark stools w/ associated drop in hgb  # macrocytic anemia  - limited hx from pt, w/ new onset dark stools w/ associated drop in hgb, hgb since improved sp 1u prbc, remains HDS, no fernando blood in ngt, no bleeding from oral cavity, no noted abd pain, no prior ac on board, c/f ugi source vs R sided colonic etiology  # sp I&D of sublingual abscess 8/29  # fevers/leukocytosis  # unspecified cognitive disorder      Recommendations-       59M with HTN, HLD, unspecified cognitive disorder, transferred from [facility] with worsening sublingual swelling. Patient sp OR for I&D of sublingual area 8/29 (concern for posterior airway swelling with possible hematoma, left intubated for airway protection) and transferred to SICU for vent management. 9/3 patient extubated. Course c/b bloody stool and GI consulted for further evaluation.     Febrile to 101 yesterday afternoon, currently afebrile, hr 72, bp 173/99, on NC. Labs: wbc ~25k, on admission with macrocytic anemia with initial hgb 9.9, hgb since flux ~7-8 range, last night hgb drop to 6.7 from 8.6 prior, sp 1u prbc-- over responded to 8.3, bun/cr ratio = 32-- 27 today    # dark stools w/ associated drop in hgb  # macrocytic anemia  - limited hx from pt, w/ new onset dark stools w/ associated drop in hgb, hgb since improved sp 1u prbc, remains HDS, no fernando blood in ngt, no bleeding from oral cavity, no noted abd pain, no prior ac on board, c/f ugi source vs R sided colonic etiology  # sp I&D of sublingual abscess 8/29  # fevers/leukocytosis  # unspecified cognitive disorder      Recommendations-  - maintain active t&s and 2 large bore IV access  - trend cbc, transfuse for hgb >/=7  - check iron studies on pre transfusion labs, b12, folate  - high dose PPI; avoid nsaids  - cont abx, f/u bld cxs, trend fever curve/leukocytosis  - plan for EGD tentatively tomorrow if otherwise medically optimized (if neg can plan for colon on Fri), keep NPO p MN, check 1AM labs; dw pts wife via phone- agreeable to pursue endoscopic eval; case also dw OMFS- no objection to EGD  - rest of care per SICU    dw gi attg    SHILPA Roy PA-C, RD, CDN  available on TEAMS M/T/TH/F from 7am - 4pm    after hours/weekends: non urgent issues --> email [email or web address], urgent issues --> contact on-call GI fellow at [phone]

## 2024-09-04 NOTE — CONSULT NOTE ADULT - SUBJECTIVE AND OBJECTIVE BOX
Chief Complaint:  Patient is a 59y old  Male who presents with a chief complaint of Sublingual abscess (04 Sep 2024 00:03)      HPI: 59M with HTN, HLD, unspecified cognitive disorder, transferred from [facility] with worsening sublingual swelling. Patient sp OR I&D of sublingual area  (concern for posterior airway swelling with possible hematoma, left intubated for airway protection) and transferred to SICU for vent management. 9/3 patient extubated. Course c/b bloody stool (per nursing notes with dark brown stool w/ blood clots/blood streaks) and GI consulted for further evaluation.     pt seen and examined. lying in bed. limited historian though denies abd pain, info obtained via staff and medical chart. per rn pt with new onset dark stools since last night (flexiseal in place). no fernando blood noted from ngt. not on tx ac. prior endoscopic hx unclear.     spoke with wife via phone - sara ruckergustavo - hx of chronic diarrhea, no prior hx of gib, no prior abd sx, no prior egd but has had regular colons last one ~2yrs ago? reportedly normal, fhx- pts brother recently passed 2024 from ?colon malignancy; wife agreeable to endoscopic eval as needed    febrile to 101 yesterday afternoon, currently afebrile hr 72 bp 173/99 on NC  wbc uptrending ~30k  on admission with macrocytic anemia with initial hgb 9.9, post op hgb ~7-8, last night hgb drop to 6.7 from 8.6 prior sp 1u prbc  bun/cr ratio = 32, ast 81 alt 322      Allergies:  No Known Allergies      PMHX/PSHX: Hypertension      Family history:  unable to obtain from pt      Social History: unable to obtain from pt    Home Medications: unable to obtain from pt    Hospital Medications:  acetaminophen   IVPB .. 1000 milliGRAM(s) IV Intermittent every 6 hours  atorvastatin 10 milliGRAM(s) Oral with breakfast  chlorhexidine 2% Cloths 1 Application(s) Topical daily  citalopram 30 milliGRAM(s) Oral with breakfast  enoxaparin Injectable 40 milliGRAM(s) SubCutaneous every 24 hours  lactated ringers. 1000 milliLiter(s) IV Continuous <Continuous>  lamoTRIgine 100 milliGRAM(s) Oral at bedtime  lithium 600 milliGRAM(s) Oral at bedtime  metoprolol tartrate 25 milliGRAM(s) Oral two times a day  pantoprazole  Injectable 40 milliGRAM(s) IV Push two times a day  piperacillin/tazobactam IVPB.. 3.375 Gram(s) IV Intermittent every 8 hours  QUEtiapine 100 milliGRAM(s) Oral at bedtime      ROS unable to obtain from pt    Vital Signs:  Vital Signs Last 24 Hrs  T(C): 37.4 (04 Sep 2024 04:00), Max: 38.5 (03 Sep 2024 15:00)  T(F): 99.4 (04 Sep 2024 04:00), Max: 101.3 (03 Sep 2024 15:00)  HR: 72 (04 Sep 2024 06:00) (52 - 136)  BP: 173/99 (04 Sep 2024 06:00) (115/77 - 193/97)  BP(mean): 118 (04 Sep 2024 06:00) (90 - 128)  RR: 20 (04 Sep 2024 06:00) (8 - 28)  SpO2: 100% (04 Sep 2024 06:00) (92% - 100%)    Parameters below as of 04 Sep 2024 06:00  Patient On (Oxygen Delivery Method): nasal cannula  O2 Flow (L/min): 2    Daily     Daily Weight in k.5 (03 Sep 2024 07:00)    LABS:                        6.7    30.12 )-----------( 412      ( 03 Sep 2024 23:30 )             20.7     Mean Cell Volume: 117.6 fL (24 @ 23:30)        146<H>  |  109<H>  |  35<H>  ----------------------------<  141<H>  4.1   |  24  |  1.11    Ca    8.8      03 Sep 2024 23:30  Phos  2.8       Mg     2.30     0903        Urinalysis Basic - ( 03 Sep 2024 23:30 )    Color: x / Appearance: x / SG: x / pH: x  Gluc: 141 mg/dL / Ketone: x  / Bili: x / Urobili: x   Blood: x / Protein: x / Nitrite: x   Leuk Esterase: x / RBC: x / WBC x   Sq Epi: x / Non Sq Epi: x / Bacteria: x                            6.7    30.12 )-----------( 412      ( 03 Sep 2024 23:30 )             20.7                         8.6    28.88 )-----------( 477      ( 03 Sep 2024 14:50 )             26.0                         8.9    18.40 )-----------( 443      ( 03 Sep 2024 01:10 )             27.3                         8.4    16.83 )-----------( 461      ( 02 Sep 2024 01:02 )             25.4       PHYSICAL EXAM:   GENERAL: lying in bed, chronically ill appearing, in no apparent distress  HEENT: NCAT, dry MM, unable to fully examine oropharynx due to pts limited ability to follow commands, NGT in place - no fernando blood  NECK: trachea midline  CHEST:  respirations even, non labored on NC  ABDOMEN:  soft, non-distended, flexiseal in place w/ dark stool in reservoir bag  EXTREMITIES: WWP  SKIN:  warm/dry, no visible rash appreciated  PSYCH: a&o x 1-2  NEURO: no tremor noted     Imaging: no abd imaging

## 2024-09-04 NOTE — PROGRESS NOTE ADULT - ASSESSMENT
59M with HTN, HLD, unspecified cognitive disorder. Transferred from [facility] with worsening sublingual swelling. Pt seen at [facility] on Aug 26th for the same complaint, bedside I&D of sublingual space done with IV Unasyn. Pt discharged the following day with PO Augmentin, however pt denied taking his PO abx after discharge. Pt unable to eat nor drink much because of the pain and difficulty swallowing and returning to hospital. Patient received OR I&D of sublingual area and experienced considerable swelling in the airway, transferred to SICU intubated to protect airway and vent management. 9/3 NGT inserted for decompression, gastric air bubble, stopped tube feeds since 4am. 9/3 patient extubated, currently on Room air.    PLAN:  Neuro  - Sedated: off sedation  - Pain control: tylenol  - home psych meds via NG tube - citalopram, lamictal, lithium, methylphenidate (on hold d/t cant crush)   - QTc 415     Resp  - Extubated to NC  - sublingual edema v hematoma.     Card  - MAP >65  - home statin 10 QD  - metoprolol 2.5 q6  - lasix 20mg iv    GI  - Diet: NPO/NGT/IVF  - Dulcolax/Miralax/Senna for bowel reg earlier now with excessive loose stool > hold bowel reg, rectal tube in place  - Protonix 40      - monitor urine output via magallon  - IVL    Heme  - DVT ppx: lovenox & SCD    ID  - Zosyn (9/1-  - Unasyn (8/28-9/1)  - Bcx from [facility]: streptococcus sensitive to pcn    Endocrine  - Monitor glucose  - Decadron d/c'ed 9/2    LINES/TUBES/DRAINS  - A-line  - NG tube    Disposition: SICU

## 2024-09-04 NOTE — PROGRESS NOTE ADULT - SUBJECTIVE AND OBJECTIVE BOX
SICU Daily Progress Note  =====================================================  Interval/Overnight Events:       - Tachy, Hypertensive --> push lopressor 5mg  - darker stool, f/u repeat CBC  - NGT output bilious, non bloody    ALLERGIES:  No Known Allergies      --------------------------------------------------------------------------------------    MEDICATIONS:    Neurologic Medications  acetaminophen   IVPB .. 1000 milliGRAM(s) IV Intermittent every 6 hours  citalopram 30 milliGRAM(s) Oral with breakfast  HYDROmorphone  Injectable 0.5 milliGRAM(s) IV Push every 4 hours PRN Moderate Pain (4 - 6)  HYDROmorphone  Injectable 1 milliGRAM(s) IV Push every 4 hours PRN Severe Pain (7 - 10)  lamoTRIgine 100 milliGRAM(s) Oral at bedtime  lithium 600 milliGRAM(s) Oral at bedtime  methylphenidate ER (CONCERTA) 72 milliGRAM(s) Oral every morning  QUEtiapine 100 milliGRAM(s) Oral at bedtime    Respiratory Medications    Cardiovascular Medications  acetaZOLAMIDE    Tablet 250 milliGRAM(s) Oral every 6 hours  metoprolol tartrate 12.5 milliGRAM(s) Oral two times a day    Gastrointestinal Medications  lactated ringers. 1000 milliLiter(s) IV Continuous <Continuous>  pantoprazole  Injectable 40 milliGRAM(s) IV Push daily    Genitourinary Medications    Hematologic/Oncologic Medications  enoxaparin Injectable 40 milliGRAM(s) SubCutaneous every 24 hours    Antimicrobial/Immunologic Medications  piperacillin/tazobactam IVPB.. 3.375 Gram(s) IV Intermittent every 8 hours    Endocrine/Metabolic Medications  atorvastatin 10 milliGRAM(s) Oral with breakfast    Topical/Other Medications  chlorhexidine 2% Cloths 1 Application(s) Topical daily    --------------------------------------------------------------------------------------    VITAL SIGNS:  T(C): 37.3 (09-03-24 @ 20:00), Max: 38.5 (09-03-24 @ 15:00)  HR: 103 (09-03-24 @ 23:00) (52 - 136)  BP: 147/86 (09-03-24 @ 23:00) (101/71 - 188/103)  RR: 23 (09-03-24 @ 23:00) (8 - 28)  SpO2: 97% (09-03-24 @ 23:00) (92% - 100%)  --------------------------------------------------------------------------------------    INS AND OUTS:    09-02-24 @ 07:01  -  09-03-24 @ 07:00  --------------------------------------------------------  IN: 1349.9 mL / OUT: 1720 mL / NET: -370.1 mL    09-03-24 @ 07:01  -  09-04-24 @ 00:03  --------------------------------------------------------  IN: 539 mL / OUT: 3090 mL / NET: -2551 mL      --------------------------------------------------------------------------------------    EXAM    NEURO: NAD,   HEENT: NC/AT  RESPIRATORY: nonlabored respirations, normal CW expansion  CARDIO: RRR, S1S2  ABDOMEN: soft, nontender, nondistended, + NGT, surgical dressing c/d/i, TAQUERIA drain output  EXTREMITIES: normal strength, no deformities    --------------------------------------------------------------------------------------    LABS                        8.6    28.88 )-----------( 477      ( 03 Sep 2024 14:50 )             26.0   09-03    144  |  105  |  33<H>  ----------------------------<  158<H>  3.9   |  26  |  1.04    Ca    8.9      03 Sep 2024 01:10  Phos  2.6     09-03  Mg     2.50     09-03      Culture - Sputum (collected 09-03-24 @ 08:53)  Source: Trach Asp Tracheal Aspirate  Gram Stain (09-03-24 @ 15:18):    No polymorphonuclear leukocytes per low power field    No Squamous epithelial cells per low power field    Moderate Gram Negative Rods per oil power field    Few Gram Positive Cocci in Pairs and Chains per oil power field      --------------------------------------------------------------------------------------

## 2024-09-04 NOTE — PROGRESS NOTE ADULT - ATTENDING COMMENTS
I agree with the detailed interval history, physical, and plan, which I have reviewed and edited where appropriate'; also agree with notes/assessment with my team on service.  I have personally examined the patient.  I was physically present for the key portions of the evaluation and management (E/M) service provided.  I reviewed all the pertinent data.  The patient is a critical care patient with life threatening hemodynamic and metabolic instability in SICU.  The SICU team has a constant risk benefit analyzes discussion and coordinating care with the primary team and all consultants.   The patient is in SICU with the chief complaint and diagnosis mentioned in the note.   The plan will be specified in the note.  59M with unspecified cognitive disorder; sp I&D with worsening sublingual abscess sp acute respiratory insufficiency in SICU with GI bleed  awake and alert  Lung clear  Heart RR  Abd soft  PLAN  Neuro  - Tylenol  Resp  -ABG  Card  - MAP >65  GI  - NPO  -NGT    - FU Uo  Heme  -Hold Lovenox  - SCD  -FU CBC  -GI fu  ID  - Unasyn   Disposition  - SICU

## 2024-09-04 NOTE — PROGRESS NOTE ADULT - ASSESSMENT
59M with HTN, HLD, unspecified cognitive disorder. POD5 s/p I&D sublingual abscess, progressing well in SICU. Improved swelling and FOM soft, non tender.    PLAN:  - F/u CBC   - F/u GI consult  - Multimodal pain management       Antonio Tena DDS   PGY-1, Oral and Maxillofacial Surgery   Available on teams  59M with HTN, HLD, unspecified cognitive disorder. POD5 s/p I&D sublingual abscess, progressing well in SICU. Improved swelling and FOM soft, non tender. Wound cx: Staph epidermidis.    PLAN:  - F/u CBC   - F/u GI consult  - Keep NPO/NGT to suction.   - Daily CXR  - Monitor BM and Melena  - C/w Zosyn.   - Multimodal pain management       Antonio Tena DDS   PGY-1, Oral and Maxillofacial Surgery   Available on teams

## 2024-09-04 NOTE — PROGRESS NOTE ADULT - ASSESSMENT
59M with HTN, HLD, unspecified cognitive disorder. Transferred from [facility] with worsening sublingual swelling. Pt seen at [facility] on Aug 26th for the same complaint, bedside I&D of sublingual space done with IV Unasyn. Pt discharged the following day with PO Augmentin, however pt denied taking his PO abx after discharge. Pt unable to eat nor drink much because of the pain and difficulty swallowing and returning to hospital. Patient received OR I&D of sublingual area and experienced considerable swelling in the airway, transferred to SICU intubated to protect airway and vent management. 9/3 NGT inserted for decompression, gastric air bubble, stopped tube feeds since 4am. 9/3 patient extubated, currently on Room air.    Neuro  - Sedated: off sedation  - Pain control: tylenol  - home psych meds via NG tube - citalopram, lamictal, lithium, methylphenidate (on hold d/t cant crush) --> will trial oral   - QTc 415     Resp  - Extubated to NC--> breathing comfortable RA  - sublingual edema v hematoma.   - 9/3 passing leak test    Card  - MAP >65  - home statin 10 QD  - metoprolol 25mg BID  - amlodipine 5mg QD      GI  - Diet: NPO/NGT/IVF--> removed NGT, f/u SLP  - Dulcolax/Miralax/Senna for bowel reg earlier now with excessive loose stool > hold bowel reg, rectal tube in place  - Protonix 40  - GI recommending endoscopy concern for GI bleed due to multiple episodes of melena.       - monitor urine output  - IVL  - TOV    Heme  - DVT ppx: lovenox & SCD  -Trend CBC    ID  - Diflucan (9/4-  - Zosyn (9/1-  - Unasyn (8/28-9/1)  - Bcx from [facility]: streptococcus sensitive to pcn    Endocrine  - Monitor glucose  - Decadron d/c'ed 9/2    LINES/TUBES/DRAINS  - A-line  - NG tube, removed     Disposition: SICU

## 2024-09-04 NOTE — CHART NOTE - NSCHARTNOTEFT_GEN_A_CORE
LIMITED BEDSIDE ULTRASOUND    VIEWS: Bilateral anterior lung views, bilateral lung bases, PSL, PSS, apical 4 chamber, subxiphoid/IVC  LUNGS: 2-3 B lines per lung field  CARDIAC: hyperdynamic, kissing ventricles  IVC: 0.9 cm     INTERPRETATION: mixed picture fluid status    A/P: hypertensive, continue to monitor HD and reassess LIMITED BEDSIDE ULTRASOUND    VIEWS: Bilateral anterior lung views, bilateral lung bases, PSL, PSS, apical 4 chamber, subxiphoid/IVC  LUNGS: 2-3 B lines per lung field  CARDIAC: hyperdynamic, kissing ventricles  IVC: 0.9 cm     INTERPRETATION: mixed picture fluid status    A/P: hypertensive, continue to monitor HD and reassess  Agree.

## 2024-09-04 NOTE — PROGRESS NOTE ADULT - ATTENDING COMMENTS
I agree with the detailed interval history, physical, and plan, which I have reviewed and edited where appropriate'; also agree with notes/assessment with my team on service.  I have personally examined the patient.  I was physically present for the key portions of the evaluation and management (E/M) service provided.  I reviewed all the pertinent data.  The patient is a critical care patient with life threatening hemodynamic and metabolic instability in SICU.  The SICU team has a constant risk benefit analyzes discussion and coordinating care with the primary team and all consultants.   The patient is in SICU with the chief complaint and diagnosis mentioned in the note.   The plan will be specified in the note.  59M with unspecified cognitive disorder; sp I&D with worsening sublingual abscess sp acute respiratory insufficiency in SICU with GI bleed  awake and alert  Lung clear  Heart RR  Abd soft  PLAN  Neuro  - Tylenol  Respi  -ABG  Cardio  - MAP >65  GI  - NPO  -NGT    - FU Uo  Heme  -Hold Lovenox  - SCD  -FU CBC  -GI fu  ID  - Unasyn   Disposition  - SICU

## 2024-09-04 NOTE — CONSULT NOTE ADULT - NS ATTEND AMEND GEN_ALL_CORE FT
This is a 59M with HTN, HLD, unspecified cognitive disorder, who had a sublingual abscess. We are aksed to see him because of melena. The patient had an NG tube that he removed, but this did not show any evidence of UGI bleeding. The melena has continued and his Hgb dropped from 9.9 to 6.7. After 1 uPRBC his Hgb is now 8.3. He has remained hemodynamically stable. He denies abdominal pain. He is alert and answers questions by mouthing words or nodding his head. His abdomen is diffusely tympanitic but not very distended. There are active bowel sounds and no masses present. Labs reviewed. A/P: melena - most likely an UGI source. Does not appear related to the sublingual abscess that was drained. Differential diagnosis includes PUD, AVM. If his wife (who is his medical proxy) consents we can schedule this for tomorrow.

## 2024-09-05 LAB
ANION GAP SERPL CALC-SCNC: 12 MMOL/L — SIGNIFICANT CHANGE UP (ref 7–14)
APTT BLD: 24.2 SEC — LOW (ref 24.5–35.6)
BASOPHILS # BLD AUTO: 0.04 K/UL — SIGNIFICANT CHANGE UP (ref 0–0.2)
BASOPHILS NFR BLD AUTO: 0.2 % — SIGNIFICANT CHANGE UP (ref 0–2)
BUN SERPL-MCNC: 22 MG/DL — SIGNIFICANT CHANGE UP (ref 7–23)
CALCIUM SERPL-MCNC: 8.4 MG/DL — SIGNIFICANT CHANGE UP (ref 8.4–10.5)
CHLORIDE SERPL-SCNC: 114 MMOL/L — HIGH (ref 98–107)
CO2 SERPL-SCNC: 18 MMOL/L — LOW (ref 22–31)
CREAT SERPL-MCNC: 0.96 MG/DL — SIGNIFICANT CHANGE UP (ref 0.5–1.3)
EGFR: 90 ML/MIN/1.73M2 — SIGNIFICANT CHANGE UP
EOSINOPHIL # BLD AUTO: 0.2 K/UL — SIGNIFICANT CHANGE UP (ref 0–0.5)
EOSINOPHIL NFR BLD AUTO: 0.9 % — SIGNIFICANT CHANGE UP (ref 0–6)
GLUCOSE SERPL-MCNC: 166 MG/DL — HIGH (ref 70–99)
HCT VFR BLD CALC: 24.9 % — LOW (ref 39–50)
HGB BLD-MCNC: 8.4 G/DL — LOW (ref 13–17)
IANC: 18.27 K/UL — HIGH (ref 1.8–7.4)
IMM GRANULOCYTES NFR BLD AUTO: 1.2 % — HIGH (ref 0–0.9)
INR BLD: 1.04 RATIO — SIGNIFICANT CHANGE UP (ref 0.85–1.18)
LYMPHOCYTES # BLD AUTO: 2.15 K/UL — SIGNIFICANT CHANGE UP (ref 1–3.3)
LYMPHOCYTES # BLD AUTO: 9.7 % — LOW (ref 13–44)
MAGNESIUM SERPL-MCNC: 2.7 MG/DL — HIGH (ref 1.6–2.6)
MCHC RBC-ENTMCNC: 32.1 PG — SIGNIFICANT CHANGE UP (ref 27–34)
MCHC RBC-ENTMCNC: 33.7 GM/DL — SIGNIFICANT CHANGE UP (ref 32–36)
MCV RBC AUTO: 95 FL — SIGNIFICANT CHANGE UP (ref 80–100)
MONOCYTES # BLD AUTO: 1.34 K/UL — HIGH (ref 0–0.9)
MONOCYTES NFR BLD AUTO: 6 % — SIGNIFICANT CHANGE UP (ref 2–14)
NEUTROPHILS # BLD AUTO: 18.27 K/UL — HIGH (ref 1.8–7.4)
NEUTROPHILS NFR BLD AUTO: 82 % — HIGH (ref 43–77)
NRBC # BLD: 0 /100 WBCS — SIGNIFICANT CHANGE UP (ref 0–0)
NRBC # FLD: 0.05 K/UL — HIGH (ref 0–0)
PHOSPHATE SERPL-MCNC: 2.1 MG/DL — LOW (ref 2.5–4.5)
PLATELET # BLD AUTO: 253 K/UL — SIGNIFICANT CHANGE UP (ref 150–400)
POTASSIUM SERPL-MCNC: 3.6 MMOL/L — SIGNIFICANT CHANGE UP (ref 3.5–5.3)
POTASSIUM SERPL-SCNC: 3.6 MMOL/L — SIGNIFICANT CHANGE UP (ref 3.5–5.3)
PROTHROM AB SERPL-ACNC: 11.7 SEC — SIGNIFICANT CHANGE UP (ref 9.5–13)
RBC # BLD: 2.62 M/UL — LOW (ref 4.2–5.8)
RBC # FLD: SIGNIFICANT CHANGE UP (ref 10.3–14.5)
SODIUM SERPL-SCNC: 144 MMOL/L — SIGNIFICANT CHANGE UP (ref 135–145)
WBC # BLD: 22.27 K/UL — HIGH (ref 3.8–10.5)
WBC # FLD AUTO: 22.27 K/UL — HIGH (ref 3.8–10.5)

## 2024-09-05 PROCEDURE — 99291 CRITICAL CARE FIRST HOUR: CPT

## 2024-09-05 PROCEDURE — 43239 EGD BIOPSY SINGLE/MULTIPLE: CPT

## 2024-09-05 RX ORDER — POTASSIUM PHOSPHATE 236; 224 MG/ML; MG/ML
15 INJECTION, SOLUTION INTRAVENOUS ONCE
Refills: 0 | Status: COMPLETED | OUTPATIENT
Start: 2024-09-05 | End: 2024-09-05

## 2024-09-05 RX ORDER — POTASSIUM CHLORIDE 10 MEQ
10 TABLET, EXT RELEASE, PARTICLES/CRYSTALS ORAL
Refills: 0 | Status: COMPLETED | OUTPATIENT
Start: 2024-09-05 | End: 2024-09-05

## 2024-09-05 RX ORDER — SUCRALFATE 1 G/10ML
1 SUSPENSION ORAL
Refills: 0 | Status: DISCONTINUED | OUTPATIENT
Start: 2024-09-05 | End: 2024-09-10

## 2024-09-05 RX ORDER — POTASSIUM CHLORIDE 10 MEQ
10 TABLET, EXT RELEASE, PARTICLES/CRYSTALS ORAL
Refills: 0 | Status: DISCONTINUED | OUTPATIENT
Start: 2024-09-05 | End: 2024-09-05

## 2024-09-05 RX ORDER — HEPARIN SODIUM,BOVINE 1000/ML
5000 VIAL (ML) INJECTION EVERY 8 HOURS
Refills: 0 | Status: DISCONTINUED | OUTPATIENT
Start: 2024-09-05 | End: 2024-09-09

## 2024-09-05 RX ORDER — POTASSIUM PHOSPHATE 236; 224 MG/ML; MG/ML
30 INJECTION, SOLUTION INTRAVENOUS ONCE
Refills: 0 | Status: DISCONTINUED | OUTPATIENT
Start: 2024-09-05 | End: 2024-09-05

## 2024-09-05 RX ADMIN — CHLORHEXIDINE GLUCONATE 1 APPLICATION(S): 40 SOLUTION TOPICAL at 15:00

## 2024-09-05 RX ADMIN — AMLODIPINE BESYLATE 5 MILLIGRAM(S): 10 TABLET ORAL at 06:05

## 2024-09-05 RX ADMIN — Medication 100 MILLIEQUIVALENT(S): at 03:10

## 2024-09-05 RX ADMIN — PIPERACILLIN SODIUM AND TAZOBACTAM SODIUM 25 GRAM(S): 3; .375 INJECTION, POWDER, FOR SOLUTION INTRAVENOUS at 06:06

## 2024-09-05 RX ADMIN — LAMOTRIGINE 100 MILLIGRAM(S): 100 TABLET, EXTENDED RELEASE ORAL at 21:37

## 2024-09-05 RX ADMIN — METOPROLOL TARTRATE 25 MILLIGRAM(S): 100 TABLET ORAL at 06:05

## 2024-09-05 RX ADMIN — Medication 5000 UNIT(S): at 21:37

## 2024-09-05 RX ADMIN — Medication 125 MILLILITER(S): at 21:36

## 2024-09-05 RX ADMIN — PIPERACILLIN SODIUM AND TAZOBACTAM SODIUM 25 GRAM(S): 3; .375 INJECTION, POWDER, FOR SOLUTION INTRAVENOUS at 21:36

## 2024-09-05 RX ADMIN — FLUCONAZOLE 100 MILLIGRAM(S): 150 TABLET ORAL at 06:51

## 2024-09-05 RX ADMIN — Medication 125 MILLILITER(S): at 00:28

## 2024-09-05 RX ADMIN — PIPERACILLIN SODIUM AND TAZOBACTAM SODIUM 25 GRAM(S): 3; .375 INJECTION, POWDER, FOR SOLUTION INTRAVENOUS at 13:00

## 2024-09-05 RX ADMIN — METOPROLOL TARTRATE 25 MILLIGRAM(S): 100 TABLET ORAL at 17:26

## 2024-09-05 RX ADMIN — Medication 100 MILLIGRAM(S): at 21:37

## 2024-09-05 RX ADMIN — Medication 40 MILLIGRAM(S): at 06:06

## 2024-09-05 RX ADMIN — LITHIUM CARBONATE 600 MILLIGRAM(S): 150 CAPSULE ORAL at 21:37

## 2024-09-05 RX ADMIN — POTASSIUM PHOSPHATE 62.5 MILLIMOLE(S): 236; 224 INJECTION, SOLUTION INTRAVENOUS at 04:47

## 2024-09-05 RX ADMIN — Medication 40 MILLIGRAM(S): at 17:26

## 2024-09-05 RX ADMIN — SUCRALFATE 1 GRAM(S): 1 SUSPENSION ORAL at 21:36

## 2024-09-05 RX ADMIN — Medication 100 MILLIEQUIVALENT(S): at 04:01

## 2024-09-05 RX ADMIN — Medication 100 MILLIEQUIVALENT(S): at 05:09

## 2024-09-05 NOTE — PROGRESS NOTE ADULT - ASSESSMENT
59M with HTN, HLD, unspecified cognitive disorder. Transferred from [facility] with worsening sublingual swelling. Pt seen at [facility] on Aug 26th for the same complaint, bedside I&D of sublingual space done with IV Unasyn. Pt discharged the following day with PO Augmentin, however pt denied taking his PO abx after discharge. Pt unable to eat nor drink much because of the pain and difficulty swallowing and returning to hospital. Patient received OR I&D of sublingual area and experienced considerable swelling in the airway, transferred to SICU intubated to protect airway and vent management.  9/3 NGT inserted for decompression, patient extubated, currently on Room air.    PLAN  Neuro  - Sedated: off sedation  - Pain control: tylenol  - home psych meds -citalopram, lamictal, lithium, methylphenidate (on hold d/t cant crush) --> will trial oral   - QTc  441     Resp  - Extubated to NC--> breathing comfortable RA  - sublingual edema v hematoma.     Card  - MAP >65  - home statin 10 QD  - metoprolol 25mg BID  - amlodipine 5mg QD    GI  - Diet: NPO/NGT/IVF--> removed NGT, f/u SLP  - Rectal tube in place  - Protonix 40  - GI recommending endoscopy concern for GI bleed due to multiple episodes of melena.       - monitor urine output  - IVF: D5LR @ 125cc/hr  - TOV-->passed    Heme  - DVT ppx: lovenox & SCD  -Trend CBC    ID  - Diflucan (9/4-  - Zosyn (9/1-  - Unasyn (8/28-9/1)  - Bcx from [facility]: streptococcus sensitive to pcn    Endocrine  - Monitor glucose  - Decadron d/c'ed 9/2    LINES/TUBES/DRAINS  - PIV  - NGT  - rectal tube    Disposition: SICU         59M with HTN, HLD, unspecified cognitive disorder. Transferred from [facility] with worsening sublingual swelling. Pt seen at [facility] on Aug 26th for the same complaint, bedside I&D of sublingual space done with IV Unasyn. Pt discharged the following day with PO Augmentin, however pt denied taking his PO abx after discharge. Pt unable to eat nor drink much because of the pain and difficulty swallowing and returning to hospital. Patient received OR I&D of sublingual area and experienced considerable swelling in the airway, transferred to SICU intubated to protect airway and vent management.  9/3 NGT inserted for decompression, patient extubated, currently on Room air.    PLAN  Neuro  - Sedated: off sedation  - Pain control: tylenol  - home psych meds -citalopram, lamictal, lithium, methylphenidate (on hold d/t cant crush) --> will trial oral   - QTc  441     Resp  - Extubated to NC--> breathing comfortable RA  - sublingual edema v hematoma.     Card  - MAP >65  - home statin 10 QD  - metoprolol 25mg BID  - amlodipine 5mg QD    GI  - Diet: NPO/NGT/IVF--> removed NGT, f/u SLP  - Rectal tube in place  - Protonix 40  - GI recommending endoscopy concern for GI bleed due to multiple episodes of melena.       - monitor urine output  - IVF: D5LR @ 125cc/hr  - TOV-->passed    Heme  - DVT ppx: SCD  - hold lovenox i/s/o possible GI bleed  -Trend CBC    ID  - Diflucan (9/4-  - Zosyn (9/1-  - Unasyn (8/28-9/1)  - Bcx from [facility]: streptococcus sensitive to pcn    Endocrine  - Monitor glucose  - Decadron d/c'ed 9/2    LINES/TUBES/DRAINS  - PIV  - rectal tube    Disposition: SICU         59M with HTN, HLD, unspecified cognitive disorder. Transferred from [facility] with worsening sublingual swelling. Pt seen at [facility] on Aug 26th for the same complaint, bedside I&D of sublingual space done with IV Unasyn. Pt discharged the following day with PO Augmentin, however pt denied taking his PO abx after discharge. Pt unable to eat nor drink much because of the pain and difficulty swallowing and returning to hospital. Patient received OR I&D of sublingual area and experienced considerable swelling in the airway, transferred to SICU intubated to protect airway and vent management.  9/3 NGT inserted for decompression, patient extubated, currently on Room air.    PLAN  Neuro  - Sedated: off sedation  - A&Ox1-2, oriented to self, partially to place and time (hospital in [place], October 2024)  - Pain control: Tylenol  - home psych meds - citalopram, lamictal, lithium, methylphenidate (on hold d/t cant crush) --> tolerating oral  - QTc  424     Resp  - Extubated to NC--> breathing comfortable RA  - sublingual edema v hematoma.     Card  - MAP >65  - home statin 10 QD  - metoprolol 25mg BID  - amlodipine 5mg QD    GI  - Diet: NPO/NGT/IVF--> removed NGT, f/u SLP  - Rectal tube in place  - Protonix 40  - GI recommending endoscopy concern for GI bleed due to multiple episodes of melena.       - monitor urine output  - IVF: D5LR @ 125cc/hr  - TOV-->passed    Heme  - DVT ppx: SCD  - hold lovenox i/s/o possible GI bleed, once bleed is r/o'ed or stable start SQH  -Trend CBC    ID  - Diflucan (9/4-  - Zosyn (9/1-  - Unasyn (8/28-9/1)  - Bcx from [facility]: streptococcus sensitive to pcn  - f/u BCx drawn 9/3, NGTD at 24hrs    Endocrine  - Monitor glucose  - Decadron d/c'ed 9/2    LINES/TUBES/DRAINS  - PIV  - rectal tube    Disposition: SICU

## 2024-09-05 NOTE — PROGRESS NOTE ADULT - SUBJECTIVE AND OBJECTIVE BOX
ORAL & MAXILLOFACIAL SURGERY PROGRESS NOTE    Periapical abscess without sinus tract        NAE HERNANDEZ  |  [id number]      Patient is a 59y Male w/ pmhx of HTN, HLD, unspeficied cognitive disorder. POD5 s/p I&D sublingual abscess.       Intervals:  hypertensive overnight given 5 lopressor x1, received 2u RBC hg 8.4<6.7. passed ToV    MEDICATIONS  (STANDING):  amLODIPine   Tablet 5 milliGRAM(s) Oral daily  atorvastatin 10 milliGRAM(s) Oral with breakfast  chlorhexidine 2% Cloths 1 Application(s) Topical daily  citalopram 30 milliGRAM(s) Oral with breakfast  dextrose 5% + lactated ringers. 1000 milliLiter(s) (125 mL/Hr) IV Continuous <Continuous>  enoxaparin Injectable 40 milliGRAM(s) SubCutaneous every 24 hours  fluconAZOLE IVPB 200 milliGRAM(s) IV Intermittent every 24 hours  lamoTRIgine 100 milliGRAM(s) Oral at bedtime  lithium 600 milliGRAM(s) Oral at bedtime  methylphenidate ER (CONCERTA) 72 milliGRAM(s) Oral every morning  metoprolol tartrate 25 milliGRAM(s) Oral two times a day  pantoprazole  Injectable 40 milliGRAM(s) IV Push two times a day  piperacillin/tazobactam IVPB.. 3.375 Gram(s) IV Intermittent every 8 hours  QUEtiapine 100 milliGRAM(s) Oral at bedtime    MEDICATIONS  (PRN):      ICU Vital Signs Last 24 Hrs  T(C): 37 (05 Sep 2024 04:00), Max: 37.6 (04 Sep 2024 16:00)  T(F): 98.6 (05 Sep 2024 04:00), Max: 99.7 (04 Sep 2024 16:00)  HR: 72 (05 Sep 2024 07:00) (60 - 98)  BP: 145/85 (05 Sep 2024 07:00) (92/60 - 175/90)  BP(mean): 102 (05 Sep 2024 07:00) (71 - 118)  ABP: --  ABP(mean): --  RR: 18 (05 Sep 2024 07:00) (13 - 29)  SpO2: 100% (05 Sep 2024 07:00) (100% - 100%)    O2 Parameters below as of 05 Sep 2024 06:00  Patient On (Oxygen Delivery Method): room air        I&O's Detail    04 Sep 2024 07:01  -  05 Sep 2024 07:00  --------------------------------------------------------  IN:    dextrose 5% + lactated ringers: 875 mL    Lactated Ringers: 100 mL  Total IN: 975 mL    OUT:    Incontinent per Condom Catheter (mL): 600 mL    Indwelling Catheter - Urethral (mL): 755 mL    Rectal Tube (mL): 300 mL  Total OUT: 1655 mL    Total NET: -680 mL          PHYSICAL EXAM:  GENERAL: AOx3 NAD.    HEENT: Facial swelling improved, inferior border of mandible palpable. Dry Heme inferior to the anterior border of the mandible. Submental region and neck edema stable. FOM and base of tongue slightly indurated. FOM hemostatic w/ sutures intact. No intraoral purulence expressed.   CHEST/LUNG: Breathing even, unlabored  GI: NGT removed, NPO, Rectal tube in place  EXTREMITIES: normal strength, no deformities    Labs:                        8.4    22.27 )-----------( 253      ( 05 Sep 2024 01:43 )             24.9   09-05    144  |  114<H>  |  22  ----------------------------<  166<H>  3.6   |  18<L>  |  0.96    Ca    8.4      05 Sep 2024 01:43  Phos  2.1     09-05  Mg     2.70     09-05 ORAL & MAXILLOFACIAL SURGERY PROGRESS NOTE    Periapical abscess without sinus tract        NAE HERNANDEZ  |  [id number]      Patient is a 59y Male w/ pmhx of HTN, HLD, unspeficied cognitive disorder. POD8 s/p I&D sublingual abscess.       Intervals:  hypertensive overnight given 5 lopressor x1, received 2u RBC hg 8.4<6.7. passed ToV    MEDICATIONS  (STANDING):  amLODIPine   Tablet 5 milliGRAM(s) Oral daily  atorvastatin 10 milliGRAM(s) Oral with breakfast  chlorhexidine 2% Cloths 1 Application(s) Topical daily  citalopram 30 milliGRAM(s) Oral with breakfast  dextrose 5% + lactated ringers. 1000 milliLiter(s) (125 mL/Hr) IV Continuous <Continuous>  enoxaparin Injectable 40 milliGRAM(s) SubCutaneous every 24 hours  fluconAZOLE IVPB 200 milliGRAM(s) IV Intermittent every 24 hours  lamoTRIgine 100 milliGRAM(s) Oral at bedtime  lithium 600 milliGRAM(s) Oral at bedtime  methylphenidate ER (CONCERTA) 72 milliGRAM(s) Oral every morning  metoprolol tartrate 25 milliGRAM(s) Oral two times a day  pantoprazole  Injectable 40 milliGRAM(s) IV Push two times a day  piperacillin/tazobactam IVPB.. 3.375 Gram(s) IV Intermittent every 8 hours  QUEtiapine 100 milliGRAM(s) Oral at bedtime    MEDICATIONS  (PRN):      ICU Vital Signs Last 24 Hrs  T(C): 37 (05 Sep 2024 04:00), Max: 37.6 (04 Sep 2024 16:00)  T(F): 98.6 (05 Sep 2024 04:00), Max: 99.7 (04 Sep 2024 16:00)  HR: 72 (05 Sep 2024 07:00) (60 - 98)  BP: 145/85 (05 Sep 2024 07:00) (92/60 - 175/90)  BP(mean): 102 (05 Sep 2024 07:00) (71 - 118)  ABP: --  ABP(mean): --  RR: 18 (05 Sep 2024 07:00) (13 - 29)  SpO2: 100% (05 Sep 2024 07:00) (100% - 100%)    O2 Parameters below as of 05 Sep 2024 06:00  Patient On (Oxygen Delivery Method): room air        I&O's Detail    04 Sep 2024 07:01  -  05 Sep 2024 07:00  --------------------------------------------------------  IN:    dextrose 5% + lactated ringers: 875 mL    Lactated Ringers: 100 mL  Total IN: 975 mL    OUT:    Incontinent per Condom Catheter (mL): 600 mL    Indwelling Catheter - Urethral (mL): 755 mL    Rectal Tube (mL): 300 mL  Total OUT: 1655 mL    Total NET: -680 mL          PHYSICAL EXAM:  GENERAL: AOx3 NAD.    HEENT: Facial swelling improved, inferior border of mandible palpable. Dry Heme inferior to the anterior border of the mandible. Submental region and neck edema stable. FOM and base of tongue slightly indurated. FOM hemostatic w/ sutures intact. No intraoral purulence expressed.   CHEST/LUNG: Breathing even, unlabored  GI: NGT removed, NPO, Rectal tube in place  EXTREMITIES: normal strength, no deformities    Labs:                        8.4    22.27 )-----------( 253      ( 05 Sep 2024 01:43 )             24.9   09-05    144  |  114<H>  |  22  ----------------------------<  166<H>  3.6   |  18<L>  |  0.96    Ca    8.4      05 Sep 2024 01:43  Phos  2.1     09-05  Mg     2.70     09-05 ORAL & MAXILLOFACIAL SURGERY PROGRESS NOTE    Periapical abscess without sinus tract        NAE HERNANDEZ  |  [id number]      Patient is a 59y Male w/ pmhx of HTN, HLD, unspeficied cognitive disorder. POD7 s/p I&D sublingual abscess.       Intervals:  hypertensive overnight given 5 lopressor x1, received 2u RBC hg 8.4<6.7. passed ToV    MEDICATIONS  (STANDING):  amLODIPine   Tablet 5 milliGRAM(s) Oral daily  atorvastatin 10 milliGRAM(s) Oral with breakfast  chlorhexidine 2% Cloths 1 Application(s) Topical daily  citalopram 30 milliGRAM(s) Oral with breakfast  dextrose 5% + lactated ringers. 1000 milliLiter(s) (125 mL/Hr) IV Continuous <Continuous>  enoxaparin Injectable 40 milliGRAM(s) SubCutaneous every 24 hours  fluconAZOLE IVPB 200 milliGRAM(s) IV Intermittent every 24 hours  lamoTRIgine 100 milliGRAM(s) Oral at bedtime  lithium 600 milliGRAM(s) Oral at bedtime  methylphenidate ER (CONCERTA) 72 milliGRAM(s) Oral every morning  metoprolol tartrate 25 milliGRAM(s) Oral two times a day  pantoprazole  Injectable 40 milliGRAM(s) IV Push two times a day  piperacillin/tazobactam IVPB.. 3.375 Gram(s) IV Intermittent every 8 hours  QUEtiapine 100 milliGRAM(s) Oral at bedtime    MEDICATIONS  (PRN):      ICU Vital Signs Last 24 Hrs  T(C): 37 (05 Sep 2024 04:00), Max: 37.6 (04 Sep 2024 16:00)  T(F): 98.6 (05 Sep 2024 04:00), Max: 99.7 (04 Sep 2024 16:00)  HR: 72 (05 Sep 2024 07:00) (60 - 98)  BP: 145/85 (05 Sep 2024 07:00) (92/60 - 175/90)  BP(mean): 102 (05 Sep 2024 07:00) (71 - 118)  ABP: --  ABP(mean): --  RR: 18 (05 Sep 2024 07:00) (13 - 29)  SpO2: 100% (05 Sep 2024 07:00) (100% - 100%)    O2 Parameters below as of 05 Sep 2024 06:00  Patient On (Oxygen Delivery Method): room air        I&O's Detail    04 Sep 2024 07:01  -  05 Sep 2024 07:00  --------------------------------------------------------  IN:    dextrose 5% + lactated ringers: 875 mL    Lactated Ringers: 100 mL  Total IN: 975 mL    OUT:    Incontinent per Condom Catheter (mL): 600 mL    Indwelling Catheter - Urethral (mL): 755 mL    Rectal Tube (mL): 300 mL  Total OUT: 1655 mL    Total NET: -680 mL          PHYSICAL EXAM:  GENERAL: AOx3 NAD.    HEENT: Facial swelling improved, inferior border of mandible palpable. Dry Heme inferior to the anterior border of the mandible. Submental region and neck edema stable. FOM and base of tongue slightly indurated. FOM hemostatic w/ sutures intact. No intraoral purulence expressed.   CHEST/LUNG: Breathing even, unlabored  GI: NGT removed, NPO, Rectal tube in place  EXTREMITIES: normal strength, no deformities    Labs:                        8.4    22.27 )-----------( 253      ( 05 Sep 2024 01:43 )             24.9   09-05    144  |  114<H>  |  22  ----------------------------<  166<H>  3.6   |  18<L>  |  0.96    Ca    8.4      05 Sep 2024 01:43  Phos  2.1     09-05  Mg     2.70     09-05

## 2024-09-05 NOTE — PROGRESS NOTE ADULT - ATTENDING COMMENTS
I agree with the detailed interval history, physical, and plan, which I have reviewed and edited where appropriate'; also agree with notes/assessment with my team on service.  I have personally examined the patient.  I was physically present for the key portions of the evaluation and management (E/M) service provided.  I reviewed all the pertinent data.  The patient is a critical care patient with life threatening hemodynamic and metabolic instability in SICU.  The SICU team has a constant risk benefit analyzes discussion and coordinating care with the primary team and all consultants.   The patient is in SICU with the chief complaint and diagnosis mentioned in the note.   The plan will be specified in the note.  59M with unspecified cognitive disorder; sp I&D with worsening sublingual abscess sp acute respiratory insufficiency in SICU with GI bleed  awake and alert  Lung clear  Heart RR  Abd soft  PLAN  Neuro  - Tylenol  Respi  -ABG  Cardio  - MAP >65  GI  - NPO  -NGT  -Endoscopy    - FU Uo  Heme  -Hold Lovenox  - SCD  -FU CBC  -GI FU  ID  - Unasyn   Disposition  - SICU

## 2024-09-05 NOTE — PROGRESS NOTE ADULT - ASSESSMENT
59M with HTN, HLD, unspecified cognitive disorder. POD6 s/p I&D sublingual abscess, progressing well in SICU. Improved swelling and FOM soft, non tender. Wound cx: Staph epidermidis.    PLAN:  - FU CBC   - FU GI  - Keep NPO/NGT to suction   - Daily CXR  - Monitor BM and Melena  - C/w Zosyn.   - Multimodal pain management    59M with HTN, HLD, unspecified cognitive disorder. POD6 s/p I&D sublingual abscess, progressing well in SICU. Improved swelling and FOM soft, non tender. Wound cx: Staph epidermidis.    PLAN:  - FU CBC   - FU GI  - Keep NPO/NGT to suction   - Daily CXR  - Monitor BM and Melena  - C/w Zosyn.   - Multimodal pain management   - Rec. Peridex mouthwash BID   59M with HTN, HLD, unspecified cognitive disorder. POD8 s/p I&D sublingual abscess, progressing well in SICU. Improved swelling and FOM soft, non tender. Wound cx: Staph epidermidis.    PLAN:  - FU CBC   - FU GI  - Keep NPO/NGT to suction   - Daily CXR  - Monitor BM and Melena  - C/w Zosyn.   - Multimodal pain management   - Rec. Peridex mouthwash BID   59M with HTN, HLD, unspecified cognitive disorder. POD7 s/p I&D sublingual abscess, progressing well in SICU. Improved swelling and FOM soft, non tender. Wound cx: Staph epidermidis.    PLAN:  - FU CBC   - FU GI  - Keep NPO/NGT to suction   - Daily CXR  - Monitor BM and Melena  - C/w Zosyn.   - Multimodal pain management   - Rec. Peridex mouthwash BID   59M with HTN, HLD, unspecified cognitive disorder. POD7 s/p I&D sublingual abscess, progressing well in SICU. Improved swelling and FOM soft, non tender. Wound cx: Staph epidermidis.    PLAN:  - FU CBC   - FU GI   - Daily CXR  - Monitor BM and Melena  - C/w Zosyn.   - Multimodal pain management   - Rec. Peridex mouthwash BID

## 2024-09-05 NOTE — PROGRESS NOTE ADULT - SUBJECTIVE AND OBJECTIVE BOX
SICU Daily Progress Note  =====================================================  Interval/Overnight Events:       - afternoon CBC 6.7 --> 1u RBC  --> repeat 8.2  - hypertensive overnight --> 5 lopressor x1   - plan for endoscopy tomorrow, continue NPO    ALLERGIES:  No Known Allergies      --------------------------------------------------------------------------------------    MEDICATIONS:    Neurologic Medications  citalopram 30 milliGRAM(s) Oral with breakfast  lamoTRIgine 100 milliGRAM(s) Oral at bedtime  lithium 600 milliGRAM(s) Oral at bedtime  methylphenidate ER (CONCERTA) 72 milliGRAM(s) Oral every morning  QUEtiapine 100 milliGRAM(s) Oral at bedtime    Respiratory Medications    Cardiovascular Medications  amLODIPine   Tablet 5 milliGRAM(s) Oral daily  metoprolol tartrate 25 milliGRAM(s) Oral two times a day    Gastrointestinal Medications  dextrose 5% + lactated ringers. 1000 milliLiter(s) IV Continuous <Continuous>  pantoprazole  Injectable 40 milliGRAM(s) IV Push two times a day    Genitourinary Medications    Hematologic/Oncologic Medications  enoxaparin Injectable 40 milliGRAM(s) SubCutaneous every 24 hours    Antimicrobial/Immunologic Medications  fluconAZOLE IVPB 200 milliGRAM(s) IV Intermittent every 24 hours  piperacillin/tazobactam IVPB.. 3.375 Gram(s) IV Intermittent every 8 hours    Endocrine/Metabolic Medications  atorvastatin 10 milliGRAM(s) Oral with breakfast    Topical/Other Medications  chlorhexidine 2% Cloths 1 Application(s) Topical daily    --------------------------------------------------------------------------------------    VITAL SIGNS:  T(C): 37.4 (09-05-24 @ 00:00), Max: 37.6 (09-04-24 @ 16:00)  HR: 60 (09-05-24 @ 00:00) (60 - 107)  BP: 92/60 (09-05-24 @ 00:00) (92/60 - 193/97)  RR: 17 (09-05-24 @ 00:00) (13 - 21)  SpO2: 100% (09-05-24 @ 00:00) (94% - 100%)  --------------------------------------------------------------------------------------    INS AND OUTS:    09-03-24 @ 07:01  -  09-04-24 @ 07:00  --------------------------------------------------------  IN: 2239 mL / OUT: 4465 mL / NET: -2226 mL    09-04-24 @ 07:01  -  09-05-24 @ 00:16  --------------------------------------------------------  IN: 100 mL / OUT: 755 mL / NET: -655 mL      --------------------------------------------------------------------------------------    EXAM    NEURO: NAD,   HEENT: NC/AT  RESPIRATORY: nonlabored respirations, normal CW expansion  CARDIO: RRR, S1S2  ABDOMEN: soft, nontender, nondistended, + NGT  EXTREMITIES: normal strength, no deformities    --------------------------------------------------------------------------------------    LABS                        8.2    21.30 )-----------( 235      ( 04 Sep 2024 18:45 )             23.9   09-04    144  |  112<H>  |  30<H>  ----------------------------<  125<H>  4.3   |  22  |  1.12    Ca    9.3      04 Sep 2024 06:48  Phos  2.9     09-04  Mg     2.30     09-04        -------------------------------------------------------------------------------------- SICU Daily Progress Note  =====================================================  Interval/Overnight Events:       - afternoon CBC 6.7 --> 1u RBC  --> repeat 8.2  - hypertensive overnight --> 5 lopressor x1   - plan for endoscopy tomorrow, continue NPO    ALLERGIES:  No Known Allergies      --------------------------------------------------------------------------------------    MEDICATIONS:    Neurologic Medications  citalopram 30 milliGRAM(s) Oral with breakfast  lamoTRIgine 100 milliGRAM(s) Oral at bedtime  lithium 600 milliGRAM(s) Oral at bedtime  methylphenidate ER (CONCERTA) 72 milliGRAM(s) Oral every morning  QUEtiapine 100 milliGRAM(s) Oral at bedtime    Respiratory Medications    Cardiovascular Medications  amLODIPine   Tablet 5 milliGRAM(s) Oral daily  metoprolol tartrate 25 milliGRAM(s) Oral two times a day    Gastrointestinal Medications  dextrose 5% + lactated ringers. 1000 milliLiter(s) IV Continuous <Continuous>  pantoprazole  Injectable 40 milliGRAM(s) IV Push two times a day    Genitourinary Medications    Hematologic/Oncologic Medications  enoxaparin Injectable 40 milliGRAM(s) SubCutaneous every 24 hours    Antimicrobial/Immunologic Medications  fluconAZOLE IVPB 200 milliGRAM(s) IV Intermittent every 24 hours  piperacillin/tazobactam IVPB.. 3.375 Gram(s) IV Intermittent every 8 hours    Endocrine/Metabolic Medications  atorvastatin 10 milliGRAM(s) Oral with breakfast    Topical/Other Medications  chlorhexidine 2% Cloths 1 Application(s) Topical daily    --------------------------------------------------------------------------------------    VITAL SIGNS:  T(C): 37.4 (09-05-24 @ 00:00), Max: 37.6 (09-04-24 @ 16:00)  HR: 60 (09-05-24 @ 00:00) (60 - 107)  BP: 92/60 (09-05-24 @ 00:00) (92/60 - 193/97)  RR: 17 (09-05-24 @ 00:00) (13 - 21)  SpO2: 100% (09-05-24 @ 00:00) (94% - 100%)  --------------------------------------------------------------------------------------    INS AND OUTS:    09-03-24 @ 07:01  -  09-04-24 @ 07:00  --------------------------------------------------------  IN: 2239 mL / OUT: 4465 mL / NET: -2226 mL    09-04-24 @ 07:01  -  09-05-24 @ 00:16  --------------------------------------------------------  IN: 100 mL / OUT: 755 mL / NET: -655 mL      --------------------------------------------------------------------------------------    EXAM    NEURO: NAD,   HEENT: NC/AT  RESPIRATORY: nonlabored respirations, normal CW expansion  CARDIO: RRR, S1S2  ABDOMEN: soft, nontender, nondistended  EXTREMITIES: normal strength, no deformities    --------------------------------------------------------------------------------------    LABS                        8.2    21.30 )-----------( 235      ( 04 Sep 2024 18:45 )             23.9   09-04    144  |  112<H>  |  30<H>  ----------------------------<  125<H>  4.3   |  22  |  1.12    Ca    9.3      04 Sep 2024 06:48  Phos  2.9     09-04  Mg     2.30     09-04        -------------------------------------------------------------------------------------- SICU Daily Progress Note  =====================================================  Interval/Overnight Events:       - afternoon CBC 6.7 --> 1u RBC  --> repeat 8.2, stable in AM at 8.4  - hypertensive overnight --> 5 lopressor x1  - plan for upper endoscopy 9/5, continue NPO  - if negative upper, GI may perform colonoscopy 9/6.    ALLERGIES:  No Known Allergies      --------------------------------------------------------------------------------------    MEDICATIONS:    Neurologic Medications  citalopram 30 milliGRAM(s) Oral with breakfast  lamoTRIgine 100 milliGRAM(s) Oral at bedtime  lithium 600 milliGRAM(s) Oral at bedtime  methylphenidate ER (CONCERTA) 72 milliGRAM(s) Oral every morning  QUEtiapine 100 milliGRAM(s) Oral at bedtime    Respiratory Medications    Cardiovascular Medications  amLODIPine   Tablet 5 milliGRAM(s) Oral daily  metoprolol tartrate 25 milliGRAM(s) Oral two times a day    Gastrointestinal Medications  dextrose 5% + lactated ringers. 1000 milliLiter(s) IV Continuous <Continuous>  pantoprazole  Injectable 40 milliGRAM(s) IV Push two times a day    Genitourinary Medications    Hematologic/Oncologic Medications  enoxaparin Injectable 40 milliGRAM(s) SubCutaneous every 24 hours    Antimicrobial/Immunologic Medications  fluconAZOLE IVPB 200 milliGRAM(s) IV Intermittent every 24 hours  piperacillin/tazobactam IVPB.. 3.375 Gram(s) IV Intermittent every 8 hours    Endocrine/Metabolic Medications  atorvastatin 10 milliGRAM(s) Oral with breakfast    Topical/Other Medications  chlorhexidine 2% Cloths 1 Application(s) Topical daily    --------------------------------------------------------------------------------------    VITAL SIGNS:  T(C): 37.4 (09-05-24 @ 00:00), Max: 37.6 (09-04-24 @ 16:00)  HR: 60 (09-05-24 @ 00:00) (60 - 107)  BP: 92/60 (09-05-24 @ 00:00) (92/60 - 193/97)  RR: 17 (09-05-24 @ 00:00) (13 - 21)  SpO2: 100% (09-05-24 @ 00:00) (94% - 100%)  --------------------------------------------------------------------------------------    INS AND OUTS:    09-03-24 @ 07:01  -  09-04-24 @ 07:00  --------------------------------------------------------  IN: 2239 mL / OUT: 4465 mL / NET: -2226 mL    09-04-24 @ 07:01  -  09-05-24 @ 00:16  --------------------------------------------------------  IN: 100 mL / OUT: 755 mL / NET: -655 mL      --------------------------------------------------------------------------------------    EXAM    NEURO: NAD,   LOSIENT: NC/AT  RESPIRATORY: nonlabored respirations, normal CW expansion  CARDIO: RRR, S1S2  ABDOMEN: soft, nontender, nondistended  EXTREMITIES: normal strength, no deformities    --------------------------------------------------------------------------------------    LABS                        8.4    22.27 )-----------( 253      ( 05 Sep 2024 01:43 )             24.9   09-05    144  |  114<H>  |  22  ----------------------------<  166<H>  3.6   |  18<L>  |  0.96    Ca    8.4      05 Sep 2024 01:43  Phos  2.1     09-05  Mg     2.70     09-05                        8.2    21.30 )-----------( 235      ( 04 Sep 2024 18:45 )             23.9   09-04    144  |  112<H>  |  30<H>  ----------------------------<  125<H>  4.3   |  22  |  1.12    Ca    9.3      04 Sep 2024 06:48  Phos  2.9     09-04  Mg     2.30     09-04        -------------------------------------------------------------------------------------- SICU Daily Progress Note  =====================================================  Interval/Overnight Events:       - afternoon CBC 6.7 --> 1u RBC  --> repeat 8.2, stable in AM at 8.4  - hypertensive overnight --> 5 lopressor x1  - plan for upper endoscopy 9/5, continue NPO  - if negative upper, GI may perform colonoscopy 9/6.  - after EGD, resume SQH    ALLERGIES:  No Known Allergies      --------------------------------------------------------------------------------------    MEDICATIONS:    Neurologic Medications  citalopram 30 milliGRAM(s) Oral with breakfast  lamoTRIgine 100 milliGRAM(s) Oral at bedtime  lithium 600 milliGRAM(s) Oral at bedtime  methylphenidate ER (CONCERTA) 72 milliGRAM(s) Oral every morning  QUEtiapine 100 milliGRAM(s) Oral at bedtime    Respiratory Medications    Cardiovascular Medications  amLODIPine   Tablet 5 milliGRAM(s) Oral daily  metoprolol tartrate 25 milliGRAM(s) Oral two times a day    Gastrointestinal Medications  dextrose 5% + lactated ringers. 1000 milliLiter(s) IV Continuous <Continuous>  pantoprazole  Injectable 40 milliGRAM(s) IV Push two times a day    Genitourinary Medications    Hematologic/Oncologic Medications  enoxaparin Injectable 40 milliGRAM(s) SubCutaneous every 24 hours    Antimicrobial/Immunologic Medications  fluconAZOLE IVPB 200 milliGRAM(s) IV Intermittent every 24 hours  piperacillin/tazobactam IVPB.. 3.375 Gram(s) IV Intermittent every 8 hours    Endocrine/Metabolic Medications  atorvastatin 10 milliGRAM(s) Oral with breakfast    Topical/Other Medications  chlorhexidine 2% Cloths 1 Application(s) Topical daily    --------------------------------------------------------------------------------------    VITAL SIGNS:  T(C): 37.4 (09-05-24 @ 00:00), Max: 37.6 (09-04-24 @ 16:00)  HR: 60 (09-05-24 @ 00:00) (60 - 107)  BP: 92/60 (09-05-24 @ 00:00) (92/60 - 193/97)  RR: 17 (09-05-24 @ 00:00) (13 - 21)  SpO2: 100% (09-05-24 @ 00:00) (94% - 100%)  --------------------------------------------------------------------------------------    INS AND OUTS:    09-03-24 @ 07:01  -  09-04-24 @ 07:00  --------------------------------------------------------  IN: 2239 mL / OUT: 4465 mL / NET: -2226 mL    09-04-24 @ 07:01  -  09-05-24 @ 00:16  --------------------------------------------------------  IN: 100 mL / OUT: 755 mL / NET: -655 mL      --------------------------------------------------------------------------------------    EXAM    NEURO: ROSA TORRES: NC/AT  RESPIRATORY: nonlabored respirations, normal CW expansion  CARDIO: RRR, S1S2  ABDOMEN: soft, nontender, nondistended  EXTREMITIES: normal strength, no deformities    --------------------------------------------------------------------------------------    LABS                        8.4    22.27 )-----------( 253      ( 05 Sep 2024 01:43 )             24.9   09-05    144  |  114<H>  |  22  ----------------------------<  166<H>  3.6   |  18<L>  |  0.96    Ca    8.4      05 Sep 2024 01:43  Phos  2.1     09-05  Mg     2.70     09-05                        8.2    21.30 )-----------( 235      ( 04 Sep 2024 18:45 )             23.9   09-04    144  |  112<H>  |  30<H>  ----------------------------<  125<H>  4.3   |  22  |  1.12    Ca    9.3      04 Sep 2024 06:48  Phos  2.9     09-04  Mg     2.30     09-04        --------------------------------------------------------------------------------------

## 2024-09-05 NOTE — SWALLOW BEDSIDE ASSESSMENT ADULT - COMMENTS
Progress Note- SICU 9/5: "59M with HTN, HLD, unspecified cognitive disorder. Transferred from [facility] with worsening sublingual swelling. Pt seen at [facility] on Aug 26th for the same complaint, bedside I&D of sublingual space done with IV Unasyn. Pt discharged the following day with PO Augmentin, however pt denied taking his PO abx after discharge. Pt unable to eat nor drink much because of the pain and difficulty swallowing and returning to hospital. Patient received OR I&D of sublingual area and experienced considerable swelling in the airway, transferred to SICU intubated to protect airway and vent management. 9/3 NGT inserted for decompression, patient extubated, currently on Room air."    Per discussion with SICU Team, patient is currently NPO pending an EGD with request to defer swallow evaluation at this time and requesting to follow up tomorrow 9/6. This department to follow up as schedule permits.

## 2024-09-06 LAB
-  AZTREONAM: SIGNIFICANT CHANGE UP
-  CEFEPIME: SIGNIFICANT CHANGE UP
-  CEFTAZIDIME: SIGNIFICANT CHANGE UP
-  CIPROFLOXACIN: SIGNIFICANT CHANGE UP
-  IMIPENEM: SIGNIFICANT CHANGE UP
-  LEVOFLOXACIN: SIGNIFICANT CHANGE UP
-  MEROPENEM: SIGNIFICANT CHANGE UP
-  PIPERACILLIN/TAZOBACTAM: SIGNIFICANT CHANGE UP
ANION GAP SERPL CALC-SCNC: 13 MMOL/L — SIGNIFICANT CHANGE UP (ref 7–14)
BLANDM BLD POS QL PROBE: SIGNIFICANT CHANGE UP
BUN SERPL-MCNC: 10 MG/DL — SIGNIFICANT CHANGE UP (ref 7–23)
CALCIUM SERPL-MCNC: 8.9 MG/DL — SIGNIFICANT CHANGE UP (ref 8.4–10.5)
CHLORIDE SERPL-SCNC: 112 MMOL/L — HIGH (ref 98–107)
CO2 SERPL-SCNC: 20 MMOL/L — LOW (ref 22–31)
CREAT SERPL-MCNC: 0.89 MG/DL — SIGNIFICANT CHANGE UP (ref 0.5–1.3)
CULTURE RESULTS: ABNORMAL
EGFR: 98 ML/MIN/1.73M2 — SIGNIFICANT CHANGE UP
GLUCOSE SERPL-MCNC: 133 MG/DL — HIGH (ref 70–99)
HCT VFR BLD CALC: 24.2 % — LOW (ref 39–50)
HGB BLD-MCNC: 8.1 G/DL — LOW (ref 13–17)
MAGNESIUM SERPL-MCNC: 2.1 MG/DL — SIGNIFICANT CHANGE UP (ref 1.6–2.6)
MCHC RBC-ENTMCNC: 32.7 PG — SIGNIFICANT CHANGE UP (ref 27–34)
MCHC RBC-ENTMCNC: 33.5 GM/DL — SIGNIFICANT CHANGE UP (ref 32–36)
MCV RBC AUTO: 97.6 FL — SIGNIFICANT CHANGE UP (ref 80–100)
METHOD TYPE: SIGNIFICANT CHANGE UP
METHOD TYPE: SIGNIFICANT CHANGE UP
NRBC # BLD: 0 /100 WBCS — SIGNIFICANT CHANGE UP (ref 0–0)
NRBC # FLD: 0.03 K/UL — HIGH (ref 0–0)
ORGANISM # SPEC MICROSCOPIC CNT: ABNORMAL
PHOSPHATE SERPL-MCNC: 1.5 MG/DL — LOW (ref 2.5–4.5)
PLATELET # BLD AUTO: 279 K/UL — SIGNIFICANT CHANGE UP (ref 150–400)
POTASSIUM SERPL-MCNC: 3.3 MMOL/L — LOW (ref 3.5–5.3)
POTASSIUM SERPL-SCNC: 3.3 MMOL/L — LOW (ref 3.5–5.3)
RBC # BLD: 2.48 M/UL — LOW (ref 4.2–5.8)
RBC # FLD: 26 % — HIGH (ref 10.3–14.5)
SODIUM SERPL-SCNC: 145 MMOL/L — SIGNIFICANT CHANGE UP (ref 135–145)
SPECIMEN SOURCE: SIGNIFICANT CHANGE UP
WBC # BLD: 17.27 K/UL — HIGH (ref 3.8–10.5)
WBC # FLD AUTO: 17.27 K/UL — HIGH (ref 3.8–10.5)

## 2024-09-06 PROCEDURE — 99233 SBSQ HOSP IP/OBS HIGH 50: CPT | Mod: GC

## 2024-09-06 RX ORDER — POTASSIUM CHLORIDE 10 MEQ
20 TABLET, EXT RELEASE, PARTICLES/CRYSTALS ORAL
Refills: 0 | Status: COMPLETED | OUTPATIENT
Start: 2024-09-06 | End: 2024-09-06

## 2024-09-06 RX ORDER — SODIUM PHOSPHATE, DIBASIC, ANHYDROUS, POTASSIUM PHOSPHATE, MONOBASIC, AND SODIUM PHOSPHATE, MONOBASIC, MONOHYDRATE 852; 155; 130 MG/1; MG/1; MG/1
2 TABLET, COATED ORAL ONCE
Refills: 0 | Status: COMPLETED | OUTPATIENT
Start: 2024-09-06 | End: 2024-09-06

## 2024-09-06 RX ORDER — CHLORHEXIDINE GLUCONATE 40 MG/ML
15 SOLUTION TOPICAL
Refills: 0 | Status: DISCONTINUED | OUTPATIENT
Start: 2024-09-06 | End: 2024-09-10

## 2024-09-06 RX ADMIN — CHLORHEXIDINE GLUCONATE 1 APPLICATION(S): 40 SOLUTION TOPICAL at 11:05

## 2024-09-06 RX ADMIN — FLUCONAZOLE 100 MILLIGRAM(S): 150 TABLET ORAL at 06:21

## 2024-09-06 RX ADMIN — METOPROLOL TARTRATE 25 MILLIGRAM(S): 100 TABLET ORAL at 05:07

## 2024-09-06 RX ADMIN — PIPERACILLIN SODIUM AND TAZOBACTAM SODIUM 25 GRAM(S): 3; .375 INJECTION, POWDER, FOR SOLUTION INTRAVENOUS at 05:07

## 2024-09-06 RX ADMIN — PIPERACILLIN SODIUM AND TAZOBACTAM SODIUM 25 GRAM(S): 3; .375 INJECTION, POWDER, FOR SOLUTION INTRAVENOUS at 14:30

## 2024-09-06 RX ADMIN — LAMOTRIGINE 100 MILLIGRAM(S): 100 TABLET, EXTENDED RELEASE ORAL at 21:46

## 2024-09-06 RX ADMIN — SUCRALFATE 1 GRAM(S): 1 SUSPENSION ORAL at 01:43

## 2024-09-06 RX ADMIN — AMLODIPINE BESYLATE 5 MILLIGRAM(S): 10 TABLET ORAL at 05:11

## 2024-09-06 RX ADMIN — SUCRALFATE 1 GRAM(S): 1 SUSPENSION ORAL at 17:29

## 2024-09-06 RX ADMIN — Medication 40 MILLIGRAM(S): at 05:06

## 2024-09-06 RX ADMIN — CHLORHEXIDINE GLUCONATE 15 MILLILITER(S): 40 SOLUTION TOPICAL at 17:30

## 2024-09-06 RX ADMIN — Medication 20 MILLIEQUIVALENT(S): at 08:07

## 2024-09-06 RX ADMIN — LITHIUM CARBONATE 600 MILLIGRAM(S): 150 CAPSULE ORAL at 21:47

## 2024-09-06 RX ADMIN — SUCRALFATE 1 GRAM(S): 1 SUSPENSION ORAL at 05:05

## 2024-09-06 RX ADMIN — Medication 5000 UNIT(S): at 21:46

## 2024-09-06 RX ADMIN — METOPROLOL TARTRATE 25 MILLIGRAM(S): 100 TABLET ORAL at 17:30

## 2024-09-06 RX ADMIN — Medication 40 MILLIGRAM(S): at 17:29

## 2024-09-06 RX ADMIN — Medication 20 MILLIEQUIVALENT(S): at 06:21

## 2024-09-06 RX ADMIN — SUCRALFATE 1 GRAM(S): 1 SUSPENSION ORAL at 11:05

## 2024-09-06 RX ADMIN — Medication 30 MILLILITER(S): at 21:45

## 2024-09-06 RX ADMIN — Medication 5000 UNIT(S): at 14:25

## 2024-09-06 RX ADMIN — PIPERACILLIN SODIUM AND TAZOBACTAM SODIUM 25 GRAM(S): 3; .375 INJECTION, POWDER, FOR SOLUTION INTRAVENOUS at 21:45

## 2024-09-06 RX ADMIN — Medication 5000 UNIT(S): at 05:06

## 2024-09-06 RX ADMIN — Medication 100 MILLIGRAM(S): at 21:47

## 2024-09-06 RX ADMIN — SODIUM PHOSPHATE, DIBASIC, ANHYDROUS, POTASSIUM PHOSPHATE, MONOBASIC, AND SODIUM PHOSPHATE, MONOBASIC, MONOHYDRATE 2 PACKET(S): 852; 155; 130 TABLET, COATED ORAL at 05:05

## 2024-09-06 RX ADMIN — Medication 30 MILLIGRAM(S): at 08:03

## 2024-09-06 RX ADMIN — Medication 20 MILLIEQUIVALENT(S): at 05:06

## 2024-09-06 NOTE — PROGRESS NOTE ADULT - ASSESSMENT
59M with HTN, HLD, unspecified cognitive disorder. POD7 s/p I&D sublingual abscess, progressing well in SICU. Improved swelling and FOM soft, non tender. Wound cx: Staph epidermidis.    PLAN:  - C/w Zosyn.   - Multimodal pain management    Peridex mouthwash BID  - OMFS anticipating stepdown    Mason De La Cruz  Oral and Maxillofacial Surgery  [facility] Pager #[phone]  [facility] Pager: [phone]  Available on Teams

## 2024-09-06 NOTE — PROGRESS NOTE ADULT - SUBJECTIVE AND OBJECTIVE BOX
SICU Daily Progress Note  =====================================================  Interval/Overnight Events:   - hgb stable over 8.2 -> 8.4, no further episodes of melena    - s/p EGD > large duodenal ulcer s/p cautirized   - SQH after EGD  - passed bedside swallow eval with nursing > CLD  - Continue protonix BID for 8 weeks then once daily, sucralfate suspension 1 gram PO QID for 7 days for NGT trauma, and resume previous diet today per GI      MEDICATIONS:   --------------------------------------------------------------------------------------  amLODIPine   Tablet 5 milliGRAM(s) Oral daily  atorvastatin 10 milliGRAM(s) Oral with breakfast  chlorhexidine 2% Cloths 1 Application(s) Topical daily  citalopram 30 milliGRAM(s) Oral with breakfast  dextrose 5% + lactated ringers. 1000 milliLiter(s) IV Continuous <Continuous>  fluconAZOLE IVPB 200 milliGRAM(s) IV Intermittent every 24 hours  heparin   Injectable 5000 Unit(s) SubCutaneous every 8 hours  lamoTRIgine 100 milliGRAM(s) Oral at bedtime  lithium 600 milliGRAM(s) Oral at bedtime  methylphenidate ER (CONCERTA) 72 milliGRAM(s) Oral every morning  metoprolol tartrate 25 milliGRAM(s) Oral two times a day  pantoprazole  Injectable 40 milliGRAM(s) IV Push two times a day  piperacillin/tazobactam IVPB.. 3.375 Gram(s) IV Intermittent every 8 hours  QUEtiapine 100 milliGRAM(s) Oral at bedtime  sucralfate suspension 1 Gram(s) Oral four times a day    --------------------------------------------------------------------------------------    VITAL SIGNS, INS/OUTS (last 24 hours):  --------------------------------------------------------------------------------------  T(C): 36.7 (09-05-24 @ 20:00), Max: 37.4 (09-05-24 @ 16:00)  HR: 55 (09-05-24 @ 20:00) (55 - 86)  BP: 168/78 (09-05-24 @ 20:00) (123/76 - 180/83)  RR: 15 (09-05-24 @ 20:00) (13 - 29)  SpO2: 100% (09-05-24 @ 20:00) (99% - 100%)      09-04-24 @ 07:01  -  09-05-24 @ 07:00  --------------------------------------------------------  IN: 1025 mL / OUT: 1955 mL / NET: -930 mL    09-05-24 @ 07:01  -  09-06-24 @ 00:30  --------------------------------------------------------  IN: 1650 mL / OUT: 1625 mL / NET: 25 mL      --------------------------------------------------------------------------------------    EXAM  NEURO: NAD,   LOISENT: NC/AT  RESPIRATORY: nonlabored respirations, normal CW expansion  CARDIO: RRR  ABDOMEN: soft, nontender, nondistended  EXTREMITIES: normal strength, no deformiti    TUBES/LINES/DRAINS  [x] Peripheral IV  [] Central Venous Line     	[] R	[] L	[] IJ	[] Fem	[] SC	Date Placed:   [] Arterial Line		[] R	[] L	[] Fem	[] Rad	[] Ax	Date Placed:   [] PICC		[] Midline		[] Mediport  [] Urinary Catheter		Date Placed:     LABS  --------------------------------------------------------------------------------------    --------------------------------------------------------------------------------------    IMAGING STUDIES:      SICU Daily Progress Note  =====================================================  Interval/Overnight Events:   - hgb stable over 8.4 -> 8.1 o/n, no further episodes of melena    - s/p EGD > large duodenal ulcer s/p cauterization  - SQH started after EGD  - passed bedside swallow eval with nursing > advanced to CLD  - GI recs - Continue protonix BID for 8 weeks then once daily, sucralfate suspension 1 gram PO QID for 7 days for NGT trauma, and resume diet.      MEDICATIONS:   --------------------------------------------------------------------------------------  amLODIPine   Tablet 5 milliGRAM(s) Oral daily  atorvastatin 10 milliGRAM(s) Oral with breakfast  chlorhexidine 2% Cloths 1 Application(s) Topical daily  citalopram 30 milliGRAM(s) Oral with breakfast  dextrose 5% + lactated ringers. 1000 milliLiter(s) IV Continuous <Continuous>  fluconAZOLE IVPB 200 milliGRAM(s) IV Intermittent every 24 hours  heparin   Injectable 5000 Unit(s) SubCutaneous every 8 hours  lamoTRIgine 100 milliGRAM(s) Oral at bedtime  lithium 600 milliGRAM(s) Oral at bedtime  methylphenidate ER (CONCERTA) 72 milliGRAM(s) Oral every morning  metoprolol tartrate 25 milliGRAM(s) Oral two times a day  pantoprazole  Injectable 40 milliGRAM(s) IV Push two times a day  piperacillin/tazobactam IVPB.. 3.375 Gram(s) IV Intermittent every 8 hours  QUEtiapine 100 milliGRAM(s) Oral at bedtime  sucralfate suspension 1 Gram(s) Oral four times a day    --------------------------------------------------------------------------------------    VITAL SIGNS, INS/OUTS (last 24 hours):  --------------------------------------------------------------------------------------  T(C): 36.7 (09-05-24 @ 20:00), Max: 37.4 (09-05-24 @ 16:00)  HR: 55 (09-05-24 @ 20:00) (55 - 86)  BP: 168/78 (09-05-24 @ 20:00) (123/76 - 180/83)  RR: 15 (09-05-24 @ 20:00) (13 - 29)  SpO2: 100% (09-05-24 @ 20:00) (99% - 100%)      09-04-24 @ 07:01  -  09-05-24 @ 07:00  --------------------------------------------------------  IN: 1025 mL / OUT: 1955 mL / NET: -930 mL    09-05-24 @ 07:01  -  09-06-24 @ 00:30  --------------------------------------------------------  IN: 1650 mL / OUT: 1625 mL / NET: 25 mL      --------------------------------------------------------------------------------------    EXAM  NEURO: NAD,   HEENT: NC/AT  RESPIRATORY: nonlabored respirations, normal CW expansion  CARDIO: RRR  ABDOMEN: soft, nontender, nondistended  EXTREMITIES: normal strength, no deformities noted    TUBES/LINES/DRAINS  [x] Peripheral IV  [] Central Venous Line     	[] R	[] L	[] IJ	[] Fem	[] SC	Date Placed:   [] Arterial Line		[] R	[] L	[] Fem	[] Rad	[] Ax	Date Placed:   [] PICC		[] Midline		[] Mediport  [] Urinary Catheter		Date Placed:     LABS  --------------------------------------------------------------------------------------                        8.1    17.27 )-----------( 279      ( 06 Sep 2024 01:30 )             24.2     --------------------------------------------------------------------------------------    09-06    145  |  112<H>  |  10  ----------------------------<  133<H>  3.3<L>   |  20<L>  |  0.89    Ca    8.9      06 Sep 2024 01:30  Phos  1.5     09-06  Mg     2.10     09-06    IMAGING STUDIES:   < from: Upper Endoscopy (09.05.24 @ 13:51) >  Findings:       The gastroesophageal flap valve was visualized endoscopically and        classified as Hill Grade II (fold present, opens with respiration).       A non-obstructing Schatzki ring was found in the lower third of the        esophagus.       The exam of the esophagus was otherwise normal.       A few dispersed, 1 to 3 mm non-bleeding erosions were found in the        cardia, in the gastric fundus and in the gastric body. Additional area        of superficial ulceration near site of erosion. There were no stigmata        of recent bleeding. Suspect these are related to NGT trauma.       The exam of the stomach was otherwise normal.       One large partially circumferential, cratered duodenal bulb ulcer with        surrounding edema and non-obstructive luminal deformity/narrowing. There        was a small visible vessel within the ulcer base. Coagulation for        bleeding prevention using coag-grasper was successful.       The exam of the duodenum was otherwise normal.                          Impression:          - Large duodenal bulb ulcer with visible vessel. Treated                        with cautery.    < end of copied text >     SICU Daily Progress Note  =====================================================  Interval/Overnight Events:   - hgb stable over 8.4 -> 8.1 o/n, no further episodes of melena    - s/p EGD > large duodenal ulcer s/p cauterization  - SQH started after EGD  - passed bedside swallow eval with nursing > advanced to CLD  - GI recs - Continue protonix BID for 8 weeks then once daily, sucralfate suspension 1 gram PO QID for 7 days for NGT trauma, and resume diet.  - pt endorses seeing and hearing things at night that may not be there - including groups of family members visiting to discuss bad news.      MEDICATIONS:   --------------------------------------------------------------------------------------  amLODIPine   Tablet 5 milliGRAM(s) Oral daily  atorvastatin 10 milliGRAM(s) Oral with breakfast  chlorhexidine 2% Cloths 1 Application(s) Topical daily  citalopram 30 milliGRAM(s) Oral with breakfast  dextrose 5% + lactated ringers. 1000 milliLiter(s) IV Continuous <Continuous>  fluconAZOLE IVPB 200 milliGRAM(s) IV Intermittent every 24 hours  heparin   Injectable 5000 Unit(s) SubCutaneous every 8 hours  lamoTRIgine 100 milliGRAM(s) Oral at bedtime  lithium 600 milliGRAM(s) Oral at bedtime  methylphenidate ER (CONCERTA) 72 milliGRAM(s) Oral every morning  metoprolol tartrate 25 milliGRAM(s) Oral two times a day  pantoprazole  Injectable 40 milliGRAM(s) IV Push two times a day  piperacillin/tazobactam IVPB.. 3.375 Gram(s) IV Intermittent every 8 hours  QUEtiapine 100 milliGRAM(s) Oral at bedtime  sucralfate suspension 1 Gram(s) Oral four times a day    --------------------------------------------------------------------------------------    VITAL SIGNS, INS/OUTS (last 24 hours):  --------------------------------------------------------------------------------------  T(C): 36.7 (09-05-24 @ 20:00), Max: 37.4 (09-05-24 @ 16:00)  HR: 55 (09-05-24 @ 20:00) (55 - 86)  BP: 168/78 (09-05-24 @ 20:00) (123/76 - 180/83)  RR: 15 (09-05-24 @ 20:00) (13 - 29)  SpO2: 100% (09-05-24 @ 20:00) (99% - 100%)      09-04-24 @ 07:01  -  09-05-24 @ 07:00  --------------------------------------------------------  IN: 1025 mL / OUT: 1955 mL / NET: -930 mL    09-05-24 @ 07:01  -  09-06-24 @ 00:30  --------------------------------------------------------  IN: 1650 mL / OUT: 1625 mL / NET: 25 mL      --------------------------------------------------------------------------------------    EXAM  NEURO: NAD,   HEENT: NC/AT  RESPIRATORY: nonlabored respirations, normal CW expansion  CARDIO: RRR  ABDOMEN: soft, nontender, nondistended  EXTREMITIES: normal strength, no deformities noted    TUBES/LINES/DRAINS  [x] Peripheral IV  [] Central Venous Line     	[] R	[] L	[] IJ	[] Fem	[] SC	Date Placed:   [] Arterial Line		[] R	[] L	[] Fem	[] Rad	[] Ax	Date Placed:   [] PICC		[] Midline		[] Mediport  [] Urinary Catheter		Date Placed:     LABS  --------------------------------------------------------------------------------------                        8.1    17.27 )-----------( 279      ( 06 Sep 2024 01:30 )             24.2     --------------------------------------------------------------------------------------    09-06    145  |  112<H>  |  10  ----------------------------<  133<H>  3.3<L>   |  20<L>  |  0.89    Ca    8.9      06 Sep 2024 01:30  Phos  1.5     09-06  Mg     2.10     09-06    IMAGING STUDIES:   < from: Upper Endoscopy (09.05.24 @ 13:51) >  Findings:       The gastroesophageal flap valve was visualized endoscopically and        classified as Hill Grade II (fold present, opens with respiration).       A non-obstructing Schatzki ring was found in the lower third of the        esophagus.       The exam of the esophagus was otherwise normal.       A few dispersed, 1 to 3 mm non-bleeding erosions were found in the        cardia, in the gastric fundus and in the gastric body. Additional area        of superficial ulceration near site of erosion. There were no stigmata        of recent bleeding. Suspect these are related to NGT trauma.       The exam of the stomach was otherwise normal.       One large partially circumferential, cratered duodenal bulb ulcer with        surrounding edema and non-obstructive luminal deformity/narrowing. There        was a small visible vessel within the ulcer base. Coagulation for        bleeding prevention using coag-grasper was successful.       The exam of the duodenum was otherwise normal.                          Impression:          - Large duodenal bulb ulcer with visible vessel. Treated                        with cautery.    < end of copied text >

## 2024-09-06 NOTE — PROGRESS NOTE ADULT - ASSESSMENT
59M with HTN, HLD, unspecified cognitive disorder. Transferred from [facility] with worsening sublingual swelling. Pt seen at [facility] on Aug 26th for the same complaint, bedside I&D of sublingual space done with IV Unasyn. Pt discharged the following day with PO Augmentin, however pt denied taking his PO abx after discharge. Pt unable to eat nor drink much because of the pain and difficulty swallowing and returning to hospital. Patient received OR I&D of sublingual area and experienced considerable swelling in the airway, transferred to SICU intubated to protect airway and vent management.  9/3 NGT inserted for decompression, patient extubated, currently on Room air.    PLAN  Neuro  - A&Ox1-2, oriented to self, partially to place and time (hospital in [place], October 2024)  - Pain control: Tylenol  - home psych meds - citalopram, lamictal, lithium, methylphenidate (on hold d/t cant crush) --> tolerating oral  - QTc  424     Resp  - Extubated to NC--> breathing comfortable RA  - sublingual edema v hematoma.     Card  - MAP >65  - home statin 10 QD  - metoprolol 25mg BID  - amlodipine 5mg QD    GI  - Diet: passed bedside SS > CLD   - Protonix 40  - F/u EGD findings with GI      - monitor urine output  - IVF: D5LR @ 125cc/hr  - TOV-->passed    Heme  - DVT ppx: SCD  - hold lovenox i/s/o possible GI bleed, once bleed is r/o'ed or stable start SQH  -Trend CBC    ID  - Diflucan (9/4-  - Zosyn (9/1-  - Unasyn (8/28-9/1)  - Bcx from [facility]: streptococcus sensitive to pcn  - f/u BCx drawn 9/3, NGTD at 48hrs    Endocrine  - Monitor glucose  - Decadron d/c'ed 9/2    LINES/TUBES/DRAINS  - PIV    Disposition: SICU   59M with HTN, HLD, unspecified cognitive disorder. Transferred from [facility] with worsening sublingual swelling. Pt seen at [facility] on Aug 26th for the same complaint, bedside I&D of sublingual space done with IV Unasyn. Pt discharged the following day with PO Augmentin, however pt denied taking his PO abx after discharge. Pt unable to eat nor drink much because of the pain and difficulty swallowing and returning to hospital. Patient received OR I&D of sublingual area and experienced considerable swelling in the airway, transferred to SICU intubated to protect airway and vent management.  9/3 NGT inserted for decompression, patient extubated, currently on Room air.    PLAN  Neuro  - A&Ox1-2, oriented to self, partially to place and time (hospital in [place], October 2024)  - Pain control: Tylenol  - home psych meds - citalopram, lamictal, lithium, methylphenidate (on hold d/t cant crush) --> tolerating PO medications  - QTc  424     Resp  - Extubated to NC--> breathing comfortable on RA  - sublingual edema v hematoma.     Card  - MAP >65  - home statin 10 QD  - metoprolol 25mg BID  - amlodipine 5mg QD    GI  - Diet: passed bedside SS > CLD   - Protonix 40  - F/u EGD findings with GI      - monitor urine output  - IVL, tolerating PO  - TOV-->passed    Heme  - DVT ppx: SCD  - SQH started 9/5 after EGD, continue for chemical dvt ppx  -Trend CBC    ID  - Diflucan (9/4-  - Zosyn (9/1-  - Unasyn (8/28-9/1)  - Bcx from [facility]: streptococcus sensitive to pcn  - f/u BCx drawn 9/3, NGTD at 48hrs    Endocrine  - Monitor glucose  - Decadron d/c'ed 9/2    LINES/TUBES/DRAINS  - PIV    Disposition: SICU   59M with HTN, HLD, unspecified cognitive disorder. Transferred from [facility] with worsening sublingual swelling. Pt seen at [facility] on Aug 26th for the same complaint, bedside I&D of sublingual space done with IV Unasyn. Pt discharged the following day with PO Augmentin, however pt denied taking his PO abx after discharge. Pt unable to eat nor drink much because of the pain and difficulty swallowing and returning to hospital. Patient received OR I&D of sublingual area and experienced considerable swelling in the airway, transferred to SICU intubated to protect airway and vent management.  9/3 NGT inserted for decompression, patient extubated, currently on Room air.    PLAN  Neuro  - A&Ox2-3, oriented to self, improved orientation to place and time vs yesterday (hospital in [place], September 2024)  - Pain control: Tylenol  - home psych meds - citalopram, lamictal, lithium, methylphenidate (on hold d/t cant crush) --> tolerating PO medications  - QTc  424     Resp  - Extubated to NC--> breathing comfortable on RA  - sublingual edema v hematoma.     Card  - MAP >65  - home statin 10 QD  - metoprolol 25mg BID  - amlodipine 5mg QD    GI  - Diet: progress to FLD  - Protonix 40 BID for 8wks, then once daily;  - Sucralfate 1g QID for 7 days  - monitor for melena/vital sign changes - endoscopic findings of non-bleeding visible vessel is Ken class 2A, which has an approx rebleed rate 43%.      - monitor urine output  - IVL, tolerating PO  - TOV-->passed    Heme  - DVT ppx: SCD and SQH  -Trend CBC    ID  - Diflucan (9/4-       -> started for empiric coverage of possible Candida infection i/s/o fever and leukocytosis. Low clinical suspicion for Candida infection at this time. Candida bacteremia should be treated for 14 days after first negative blood cultures - NGTD in most recent Bcx 9/3. Cost-benefit: coverage of possible fungal source of infection vs. risk for QT prolongation (in setting of other meds affecting QT). Currently on continuous EKG monitoring, will continue fluconazole for now.  - Zosyn (9/1-       -> currently treating Pseudomonas from sputum culture. Recommendation for uncomplicated Pseudomonal VAP is treatment with appropriate Abx for 7-10 days, up to 21 day in cases with concurrent bacteremia, neutropenia, or poor drug response. Given favorable clinical response to tx and lack of aforementioned risk factors, continue with Zosyn until 9/10 and DC if patient no longer meets clinical suspicion for infection.  - Unasyn (8/28-9/1)  - Bcx from [facility]: streptococcus sensitive to pcn  - f/u BCx drawn 9/3, NGTD at 48hrs    Endocrine  - Monitor glucose  - Decadron d/c'ed 9/2    LINES/TUBES/DRAINS  - PIV    Disposition: SICU

## 2024-09-06 NOTE — SWALLOW BEDSIDE ASSESSMENT ADULT - COMMENTS
Progress Note- SICU 9/6: "59M with HTN, HLD, unspecified cognitive disorder. Transferred from [facility] with worsening sublingual swelling. Pt seen at [facility] on Aug 26th for the same complaint, bedside I&D of sublingual space done with IV Unasyn. Pt discharged the following day with PO Augmentin, however pt denied taking his PO abx after discharge. Pt unable to eat nor drink much because of the pain and difficulty swallowing and returning to hospital. Patient received OR I&D of sublingual area and experienced considerable swelling in the airway, transferred to SICU intubated to protect airway and vent management. 9/3 NGT inserted for decompression, patient extubated, currently on Room air."    See GI Notes.     Per SICU Team, clinical swallow evaluation not clinically warranted at this time and will reconsult if/when clinically indicated. Progress Note- SICU 9/6: "59M with HTN, HLD, unspecified cognitive disorder. Transferred from [facility] with worsening sublingual swelling. Pt seen at [facility] on Aug 26th for the same complaint, bedside I&D of sublingual space done with IV Unasyn. Pt discharged the following day with PO Augmentin, however pt denied taking his PO abx after discharge. Pt unable to eat nor drink much because of the pain and difficulty swallowing and returning to hospital. Patient received OR I&D of sublingual area and experienced considerable swelling in the airway, transferred to SICU intubated to protect airway and vent management. 9/3 NGT inserted for decompression, patient extubated, currently on Room air."    See GI Notes.     Per SICU Team, swallow evaluation not clinically warranted at this time and will reconsult if/when clinically indicated.

## 2024-09-06 NOTE — CHART NOTE - NSCHARTNOTEFT_GEN_A_CORE
NUTRITION FOLLOW UP NOTE    Pt seen for nutrition follow up.     SOURCE: [X] Medical record     Medical Course:  - Per chart, pt is 59 year old male PMH HTN, HLD, unspecified cognitive disorder transferred from [facility] with worsening sublingual swelling. S/p I&D of sublingual area (8/29) transferred to SICU for vent management. Extubated (9/3). Course complicated by bloody stool for which GI was consulted. S/p EGD (9/5) large duodenal ulcer, cauterized. OMFS following.     Diet Prescription:   - Clear Liquid     Nutrition Course:  - Pt was previously maintained on EN (8/30-9/3). Pt was extubated (9/3) and noted with multiple large, dark BMs therefore NPO. S/p EGD (9/5), has since been started on a clear liquid diet. Now with greenish stool. Rectal tube output per flowsheets: (9/5) 300 mL, (9/4) 100 mL.   - Pt continues on IVF with D5. Labs notable for hypophosphatemia, hypokalemia.  - Pending bedside swallow assessment.     Food Allergy/Intolerance:  - NKFA    Pertinent Medications:   - atorvastatin, dextrose 5% + lactated ringers IV Continuous, fluconAZOLE IV, pantoprazole IV, piperacillin/tazobactam IV, potassium chloride Tablet ER, sucralfate suspension     Pertinent Labs:   - (9/6) Na 145 mmol/L Glu 133 mg/dL<H> K+ 3.3 mmol/L<L> Cr 0.89 mg/dL BUN 10 mg/dL Phos 1.5 mg/dL<L>  - (8/25) HbA1c 5.0%     Weight: (9/6) 92.3 lbs / 41.9 kg, (8/30) 107.8 lbs / 48.9 kg, (8/29 dosing) 100.9 lbs / 45.8 kg   Weight Assessment: Apparent ~9 lb (9%) weight loss since admission (9 days), clinically significant.   Height: 61 in / 154.9 cm  IBW: 112 lbs / 50.9 kg +/-10%  BMI: 17.4 kg/m^2 (at lowest weight)    Physical Assessment, per flowsheets:  Edema: 1+ generalized  Pressure Injury: none noted   Nutrition Focused Physical Exam: [X] conducted   Muscle wasting [X] severe thigh [X] severe calf    Estimated Needs:   [X] Recalculated, with consideration for extubation and weight loss, based on current (and lowest) body weight 92.3 lbs / 41.9 kg   kcal daily @ kcal/kg,  gm protein daily @ gm/kg     Previous Nutrition Diagnosis: [X] Swallowing difficulty, remains appropriate  New Nutrition Diagnosis: [X] Severe malnutrition in the context of acute illness or injury related to inability to consume sufficient nutrition, altered GI function as evidenced by severe muscle wasting, >2% weight loss x1 week.    Education:  [X] not applicable     Interventions:   1) Recommend addition of Ensure Clear 1 PO 3x daily (provides 240 kcal, 8 gm protein per 8oz serving) while pt to remain on clear liquid diet.    2) Monitor electrolytes (K+, Mg, P) and replete to within desired limits as clinically indicated.     Monitor & Evaluate:  PO intake, tolerance to diet/supplement, nutrition related lab values, weight trends, GI distress, hydration status, skin integrity.    Tanisha Minor RDN, CDN #[id number]  Also available on [employer] Teams NUTRITION FOLLOW UP NOTE    Pt seen for nutrition follow up.     SOURCE: [X] Medical record     Medical Course:  - Per chart, pt is 59 year old male PMH HTN, HLD, unspecified cognitive disorder transferred from [facility] with worsening sublingual swelling. S/p I&D of sublingual area (8/29) transferred to SICU for vent management. Extubated (9/3). Course complicated by bloody stool for which GI was consulted. S/p EGD (9/5) large duodenal ulcer, cauterized. OMFS following.     Diet Prescription:   - Clear Liquid     Nutrition Course:  - Pt was previously maintained on EN (8/30-9/3). Pt was extubated (9/3) and noted with multiple large, dark BMs therefore NPO. S/p EGD (9/5), has since been started on a clear liquid diet. Pt requires total assistance with feeding. Now with greenish stool. Rectal tube output per flowsheets: (9/5) 300 mL, (9/4) 100 mL.   - Pt continues on IVF with D5. Labs notable for hypophosphatemia, hypokalemia.  - Pending bedside swallow assessment.     Food Allergy/Intolerance:  - NKFA    Pertinent Medications:   - atorvastatin, dextrose 5% + lactated ringers IV Continuous, fluconAZOLE IV, pantoprazole IV, piperacillin/tazobactam IV, potassium chloride Tablet ER, sucralfate suspension     Pertinent Labs:   - (9/6) Na 145 mmol/L Glu 133 mg/dL<H> K+ 3.3 mmol/L<L> Cr 0.89 mg/dL BUN 10 mg/dL Phos 1.5 mg/dL<L>  - (8/25) HbA1c 5.0%     Weight: (9/6) 92.3 lbs / 41.9 kg, (8/30) 107.8 lbs / 48.9 kg, (8/29 dosing) 100.9 lbs / 45.8 kg   Weight Assessment: Apparent ~9 lb (9%) weight loss since admission (9 days), clinically significant.   Height: 61 in / 154.9 cm  IBW: 112 lbs / 50.9 kg +/-10%  BMI: 17.4 kg/m^2 (at lowest weight)    Physical Assessment, per flowsheets:  Edema: 1+ generalized  Pressure Injury: none noted   Nutrition Focused Physical Exam: [X] conducted   Muscle wasting [X] severe thigh [X] severe calf    Estimated Needs:   [X] Recalculated, with consideration for extubation and weight loss, based on current (and lowest) body weight 92.3 lbs / 41.9 kg  3341-3731 kcal daily @30-35 kcal/kg, 50.28-62.85 gm protein daily @1.2-1.5 gm/kg     Previous Nutrition Diagnosis: [X] Swallowing difficulty, remains appropriate  New Nutrition Diagnosis: [X] Severe malnutrition in the context of acute illness or injury related to inability to consume sufficient nutrition, altered GI function as evidenced by severe muscle wasting, >2% weight loss x1 week.    Education:  [X] not applicable     Interventions:   1) Recommend addition of Ensure Clear 1 PO 3x daily (provides 240 kcal, 8 gm protein per 8oz serving) while pt to remain on clear liquid diet.    2) Monitor electrolytes (K+, Mg, P) and replete to within desired limits as clinically indicated.   3) Obtain daily weights via tared bed scale.     Monitor & Evaluate:  PO intake, tolerance to diet/supplement, nutrition related lab values, weight trends, GI distress, hydration status, skin integrity.    Tanisha Minor RDN, CDN #[id number]  Also available on [employer] Teams NUTRITION CRITICAL CARE FOLLOW UP NOTE    Pt seen for nutrition follow up.     SOURCE: [X] Medical record     Medical Course:  - Per chart, pt is 59 year old male PMH HTN, HLD, unspecified cognitive disorder transferred from [facility] with worsening sublingual swelling. S/p I&D of sublingual area (8/29) transferred to SICU for vent management. Extubated (9/3). Course complicated by bloody stool for which GI was consulted. S/p EGD (9/5) large duodenal ulcer, cauterized. OMFS following.     Diet Prescription:   - Clear Liquid     Nutrition Course:  - Pt was previously maintained on EN (8/30-9/3). Pt was extubated (9/3) and noted with multiple large, dark BMs therefore NPO. S/p EGD (9/5), has since been started on a clear liquid diet. Pt requires total assistance with feeding. Now with greenish stool. Rectal tube output per flowsheets: (9/5) 300 mL, (9/4) 100 mL.   - Pt continues on IVF with D5. Labs notable for hypophosphatemia, hypokalemia.  - Pending bedside swallow assessment.     Food Allergy/Intolerance:  - NKFA    Pertinent Medications:   - atorvastatin, dextrose 5% + lactated ringers IV Continuous, fluconAZOLE IV, pantoprazole IV, piperacillin/tazobactam IV, potassium chloride Tablet ER, sucralfate suspension     Pertinent Labs:   - (9/6) Na 145 mmol/L Glu 133 mg/dL<H> K+ 3.3 mmol/L<L> Cr 0.89 mg/dL BUN 10 mg/dL Phos 1.5 mg/dL<L>  - (8/25) HbA1c 5.0%     Weight: (9/6) 92.3 lbs / 41.9 kg, (8/30) 107.8 lbs / 48.9 kg, (8/29 dosing) 100.9 lbs / 45.8 kg   Weight Assessment: Apparent ~9 lb (9%) weight loss since admission (9 days), clinically significant.   Height: 61 in / 154.9 cm  IBW: 112 lbs / 50.9 kg +/-10%  BMI: 17.4 kg/m^2 (at lowest weight)    Physical Assessment, per flowsheets:  Edema: 1+ generalized  Pressure Injury: none noted   Nutrition Focused Physical Exam: [X] conducted   Muscle wasting [X] severe thigh [X] severe calf    Estimated Needs:   [X] Recalculated, with consideration for extubation and weight loss, based on current (and lowest) body weight 92.3 lbs / 41.9 kg  0169-8611 kcal daily @30-35 kcal/kg, 50.28-62.85 gm protein daily @1.2-1.5 gm/kg     Previous Nutrition Diagnosis: [X] Swallowing difficulty, remains appropriate  New Nutrition Diagnosis: [X] Severe malnutrition in the context of acute illness or injury related to inability to consume sufficient nutrition, altered GI function as evidenced by severe muscle wasting, >2% weight loss x1 week.    Education:  [X] not applicable     Interventions:   1) Recommend addition of Ensure Clear 1 PO 3x daily (provides 240 kcal, 8 gm protein per 8oz serving) while pt to remain on clear liquid diet.    2) Monitor electrolytes (K+, Mg, P) and replete to within desired limits as clinically indicated.   3) Obtain daily weights via tared bed scale.     Monitor & Evaluate:  PO intake, tolerance to diet/supplement, nutrition related lab values, weight trends, GI distress, hydration status, skin integrity.    Tanisha Minor RDN, CDN #[id number]  Also available on [employer] Teams

## 2024-09-06 NOTE — PROGRESS NOTE ADULT - ASSESSMENT
59M with HTN, HLD, unspecified cognitive disorder, transferred from [facility] with worsening sublingual swelling. Patient sp OR for I&D of sublingual area 8/29 (concern for posterior airway swelling with possible hematoma, left intubated for airway protection) and transferred to SICU for vent management. 9/3 patient extubated. Course c/b bloody stool and GI consulted for further evaluation.     # dark stools w/ associated drop in hgb 2/2 DU  # macrocytic anemia  - sp EGD 9/5 with findings of one large partially circumferential, cratered duodenal bulb ulcer with surrounding edema and non-obstructive luminal deformity/narrowing with small visible vessel within ulcer base, sp cautery and few dispersed non-bleeding erosions in cardia, gastric fundus and gastric body. Additional area of superficial ulceration near site of erosion. No stigmata of recent bleeding- suspect related to NGT trauma       # sp I&D of sublingual abscess 8/29  # prior fevers/leukocytosis  # unspecified cognitive disorder      Recommendations-  - PPI BID for 8 weeks, then once daily  - sucralfate suspension 1 gram PO QID for 7 days   - will need repeat EGD in 2-3 months to check for ulcer healing  - diet as tolerated as per SLP recs  - maintain active t&s and adequate IV access  - trend cbc, transfuse for hgb >/=7  - rest of care per SICU    Please provide patient with Gastroenterology number upon discharge for scheduling of outpatient follow up appointment: [facility] at [address] [place] [address], [phone] or [facility] at [address], [place] [address], [phone]     *all recommendations are tentative until note is attested by attending*    SHILPA Roy PA-C, RD, CDN  available on TEAMS M/T/TH/F from 7am - 4pm    after hours/weekends: non urgent issues --> email [email or web address], urgent issues --> contact on-call GI fellow at [phone]

## 2024-09-06 NOTE — DIETITIAN NUTRITION RISK NOTIFICATION - ADDITIONAL COMMENTS/DIETITIAN RECOMMENDATIONS
Please see Chart Note- Nutrition Services (09/06/2024) for complete recommendations.  Tanisha Minor RDN, CDN #[id number]  Also available on [employer] Teams

## 2024-09-06 NOTE — PROGRESS NOTE ADULT - SUBJECTIVE AND OBJECTIVE BOX
Patient is a 59y Male w/ pmhx of HTN, HLD, unspecified cognitive disorder. POD8 s/p I&D sublingual abscess.       Intervals:  Cardiac stable over night, GI cauterize large GI ulcer likely cause of bleed, H&H stable, passed SLP eval on to clears    ROS       Neuro  AAOx2, patient thinks he is in a [facility]   pain controlled   citalopram, lamictal, lithium, methylphenidate tolerating PO medications  Resp  - Tolerating RA well    Card  - MAP >65  - home statin 10 QD  - metoprolol 25mg BID  - amlodipine 5mg QD    GI  - Diet: passed bedside SS > CLD   - Protonix 40  - GI bleed cauterized       - TOV-->passed    Heme  - DVT ppx: SCD, Heparin       ID  - Diflucan (9/4-  - Zosyn (9/1-  - Unasyn (8/28-9/1)  - Bcx from [facility]: streptococcus sensitive to pcn  - f/u BCx drawn 9/3, NGTD at 48hrs    Endocrine  - Monitor glucose  - Decadron d/c'ed 9/2

## 2024-09-06 NOTE — PROGRESS NOTE ADULT - ATTENDING COMMENTS
I agree with the history, physical, and plan, which I have reviewed and edited where appropriate.  I agree with notes/assessment of health care providers on my service.  I have personally examined the patient.  I was physically present for the key portions of the evaluation and management (E/M) service provided.  I reviewed data and laboratory tests/x-rays and all pertinent electronic images.  The patient is a critical care patient with life threatening hemodynamic and metabolic instability in SICU.  Risk benefit analyses discussed.    The patient is in SICU with diagnosis mentioned in the note.    The plan is specified below.    59M with HTN, HLD, unspecified cognitive disorder. Transferred from [facility] with worsening sublingual swelling. Pt seen at [facility] on Aug 26th for the same complaint, bedside I&D of sublingual space done with IV Unasyn. Pt discharged the following day with PO Augmentin, however pt denied taking his PO abx after discharge. Pt unable to eat nor drink much because of the pain and difficulty swallowing and returning to hospital. Patient received OR I&D of sublingual area and experienced considerable swelling in the airway, transferred to SICU intubated to protect airway and vent management.  9/3 NGT inserted for decompression, patient extubated, currently on Room air.    PLAN  Neuro  - A&Ox2-3, oriented to self, improved orientation to place and time vs yesterday (hospital in [place], September 2024)  - Pain control: Tylenol  - home psych meds - citalopram, lamictal, lithium, methylphenidate (on hold d/t cant crush) --> tolerating PO medications  - QTc  424     Resp  - Extubated to NC--> breathing comfortable on RA  - sublingual edema v hematoma.     Card  - MAP >65  - home statin 10 QD  - metoprolol 25mg BID  - amlodipine 5mg QD    GI  - Diet: progress to FLD  - Protonix 40 BID for 8wks, then once daily;  - Sucralfate 1g QID for 7 days  - monitor for melena/vital sign changes - endoscopic findings of non-bleeding visible vessel is Ken class 2A, which has an approx rebleed rate 43%.      - monitor urine output  - IVL, tolerating PO  - TOV-->passed    Heme  - DVT ppx: SCD and SQH  -Trend CBC    ID  - Diflucan (9/4-       -> started for empiric coverage of possible Candida in abscess   - Zosyn (9/1-       -> currently treating Pseudomonas from sputum culture. continue with Zosyn until 9/10   - Bcx from [facility]: streptococcus sensitive to pcn  - f/u BCx drawn 9/3, NGTD at 48hrs    Endocrine  - Monitor glucose I agree with the history, physical, and plan, which I have reviewed and edited where appropriate.  I agree with notes/assessment of health care providers on my service.  I have personally examined the patient.  I was physically present for the key portions of the evaluation and management (E/M) service provided.  I reviewed data and laboratory tests/x-rays and all pertinent electronic images.  The patient is a critical care patient with life threatening hemodynamic and metabolic instability in SICU.  Risk benefit analyses discussed.    The patient is in SICU with diagnosis mentioned in the note.    The plan is specified below.    59M with HTN, HLD, unspecified cognitive disorder. Transferred from [facility] with worsening sublingual swelling. Pt seen at [facility] on Aug 26th for the same complaint, bedside I&D of sublingual space done with IV Unasyn. Pt discharged the following day with PO Augmentin, however pt denied taking his PO abx after discharge. Pt unable to eat nor drink much because of the pain and difficulty swallowing and returning to hospital. Patient received OR I&D of sublingual area and experienced considerable swelling in the airway, transferred to SICU intubated to protect airway and vent management.  9/3 NGT inserted for decompression, patient extubated, currently on Room air. Stable for transfer to floor.     PLAN  Neuro: h/o bipolar   - Pain control: Tylenol  - home psych meds - citalopram, lamictal, lithium, methylphenidate      Resp: s/p incision and drainage of sublingual abscess   - on RA    Card: h/o htn   - home statin 10 QD  - metoprolol 25mg BID  - amlodipine 5mg QD    GI: UGIB s/p EGD/Cauterization of duodenal vessel   - Diet: progress to FLD  - Protonix 40 BID for 8wks, then once daily;  - Sucralfate 1g QID for 7 days      - IVL, tolerating PO  - voiding    Heme  - DVT ppx: SCD and SQH    ID  - Diflucan (9/4- ) started for empiric coverage of possible Candida in abscess, complete on 9/10  - Zosyn (9/1- for strep bacteremia and pseudomonas in sputum, until 9/10   - Bcx from [facility]: streptococcus intermedius, complete 14d abx on 9/10, no need for echo, low endocarditis risk     Endocrine  - Monitor glucose

## 2024-09-06 NOTE — PROGRESS NOTE ADULT - SUBJECTIVE AND OBJECTIVE BOX
Interval Events: sp egd yesterday, hh stable, bun normalized. pt seen and examined. denies abd pain. per nursing no overt gib, passing greenish stool      Allergies:  No Known Allergies      Hospital Medications:  amLODIPine   Tablet 5 milliGRAM(s) Oral daily  atorvastatin 10 milliGRAM(s) Oral with breakfast  chlorhexidine 0.12% Liquid 15 milliLiter(s) Swish and Spit two times a day  chlorhexidine 2% Cloths 1 Application(s) Topical daily  citalopram 30 milliGRAM(s) Oral with breakfast  dextrose 5% + lactated ringers. 1000 milliLiter(s) IV Continuous <Continuous>  fluconAZOLE IVPB 200 milliGRAM(s) IV Intermittent every 24 hours  heparin   Injectable 5000 Unit(s) SubCutaneous every 8 hours  lamoTRIgine 100 milliGRAM(s) Oral at bedtime  lithium 600 milliGRAM(s) Oral at bedtime  methylphenidate ER (CONCERTA) 72 milliGRAM(s) Oral every morning  metoprolol tartrate 25 milliGRAM(s) Oral two times a day  pantoprazole  Injectable 40 milliGRAM(s) IV Push two times a day  piperacillin/tazobactam IVPB.. 3.375 Gram(s) IV Intermittent every 8 hours  potassium chloride    Tablet ER 20 milliEquivalent(s) Oral every 2 hours  QUEtiapine 100 milliGRAM(s) Oral at bedtime  sucralfate suspension 1 Gram(s) Oral four times a day      unable to obtain full ROS from pt    Vital Signs:  Vital Signs Last 24 Hrs  T(C): 37.1 (06 Sep 2024 04:00), Max: 37.4 (05 Sep 2024 16:00)  T(F): 98.7 (06 Sep 2024 04:00), Max: 99.4 (05 Sep 2024 16:00)  HR: 62 (06 Sep 2024 07:00) (55 - 87)  BP: 159/80 (06 Sep 2024 07:00) (126/82 - 187/90)  BP(mean): 105 (06 Sep 2024 07:00) (95 - 123)  RR: 18 (06 Sep 2024 07:00) (13 - 24)  SpO2: 100% (06 Sep 2024 07:00) (97% - 100%)    Parameters below as of 06 Sep 2024 07:00  Patient On (Oxygen Delivery Method): room air      Daily     Daily Weight in k.9 (06 Sep 2024 04:00)      24 @ 07:01  -  24 @ 07:00  --------------------------------------------------------  IN: 2400 mL / OUT: 2525 mL / NET: -125 mL        LABS:                        8.1    17.27 )-----------( 279      ( 06 Sep 2024 01:30 )             24.2     Mean Cell Volume: 97.6 fL (24 @ 01:30)        145  |  112<H>  |  10  ----------------------------<  133<H>  3.3<L>   |  20<L>  |  0.89    Ca    8.9      06 Sep 2024 01:30  Phos  1.5       Mg     2.10             PT/INR - ( 05 Sep 2024 01:43 )   PT: 11.7 sec;   INR: 1.04 ratio         PTT - ( 05 Sep 2024 01:43 )  PTT:24.2 sec  Urinalysis Basic - ( 06 Sep 2024 01:30 )    Color: x / Appearance: x / SG: x / pH: x  Gluc: 133 mg/dL / Ketone: x  / Bili: x / Urobili: x   Blood: x / Protein: x / Nitrite: x   Leuk Esterase: x / RBC: x / WBC x   Sq Epi: x / Non Sq Epi: x / Bacteria: x                              8.1    17.27 )-----------( 279      ( 06 Sep 2024 01:30 )             24.2                         8.4    22.27 )-----------( 253      ( 05 Sep 2024 01:43 )             24.9                         8.2    21.30 )-----------( 235      ( 04 Sep 2024 18:45 )             23.9                         6.7    24.86 )-----------( 292      ( 04 Sep 2024 14:56 )             19.6                         8.3    25.41 )-----------( 322      ( 04 Sep 2024 06:20 )             24.7       PHYSICAL EXAM  GENERAL: lying in bed, in no apparent distress  HEENT: NCAT  NECK: trachea midline  CHEST:  respirations even, non labored  ABDOMEN:  softly dt, nt  EXTREMITIES: WWP  SKIN:  warm/dry, no visible rash appreciated  PSYCH: awake alert but confused  NEURO: no tremor noted       [facility]  _______________________________________________________________________________  Patient Name: Dyllan Camacho         Procedure Date: 2024 1:51 PM  MRN: [id number]                     Account Number: [id number]  YOB: 1965              Admit Type: Inpatient  Room: [place] [address]                         Gender: Male  Attending MD: ZOEY MILLER MD            _______________________________________________________________________________     Procedure:          Upper GI endoscopy  Indications:         Melena  Providers:           ZOEY MILLER MD, ADEBAYO JETT, FELLOW (Fellow)  Medicines:           Monitored Anesthesia Care  Complications:       No immediate complications.  Procedure:           Pre-Anesthesia Assessment:                       - Prior to the procedure, a History and Physical was                        performed, and patient medications and allergies were                        reviewed. The patient is competent. The risks and                       benefits of the procedure and the sedation options and                        risks were discussed with the patient. All questions                        were answered and informed consent was obtained. Patient      identification and proposed procedure were verified by                        the physician, the nurse and the anesthesiologist in the                        pre-procedure area in the procedure room in the                        endoscopy suite. Prophylactic Antibiotics: The patient                        does not require prophylactic antibiotics. Prior                        Anticoagulants: The patient has taken no previous                        anticoagulant or antiplatelet agents. ASA Grade                        Assessment: II - A patient with mild systemic disease.                        After reviewing the risks and benefits, the patient was                        deemed in satisfactory condition to undergo the    procedure. The anesthesia plan was to use monitored                        anesthesia care (MAC). Immediately prior to                        administration of medications, the patient was                        re-assessed for adequacy to receive sedatives. The heart                        rate, respiratory rate, oxygen saturations, blood                        pressure, adequacy of pulmonary ventilation, and                        response to care were monitored throughout the         procedure. The physical status of the patient was                        re-assessed after the procedure.                       After obtaining informed consent, the endoscope was                        passed under direct vision. Throughout the procedure,                        the patient's blood pressure, pulse, and oxygen                        saturations were monitored continuously. The Endoscope                        was introduced through the mouth, and advanced to the             second part of duodenum. The upper GI endoscopy was                        accomplished without difficulty.                                                                                   Findings:       The gastroesophageal flap valve was visualized endoscopically and        classified as Hill Grade II (fold present, opens with respiration).       A non-obstructing Schatzki ring was found in the lower third of the        esophagus.       The exam of the esophagus was otherwise normal.       A few dispersed, 1 to 3 mm non-bleeding erosions were found in the        cardia, in the gastric fundus and in the gastric body. Additional area        of superficial ulceration near site of erosion. There were no stigmata        of recent bleeding. Suspect these are related to NGT trauma.       The exam of the stomach was otherwise normal.       One large partially circumferential, cratered duodenal bulb ulcer with        surrounding edema and non-obstructive luminal deformity/narrowing. There        was a small visible vessel within the ulcer base. Coagulation for        bleeding prevention using coag-grasper was successful.       The exam of the duodenum was otherwise normal.                          Impression:          - Large duodenal bulb ulcer with visible vessel. Treated                        with cautery.  Recommendation:      - Use a proton pump inhibitor BID for 8 weeks, then once                        daily                    - Use sucralfate suspension 1 gram PO QID for 7 days for                        NGT trauma.                       - Resume previous diet today.                       - Monitor for recurrent bleeding and trend H/H    - Recommend repeat EGD in 2-3 months to check for                        gastric ulcer healing.                                                                                   Attending Participation:       I was present and participated during the entire procedure, including        non-key portions.                                                                                     _____________  ZOEY MILLER MD  2024 5:15:34 PM  Number of Addenda: 0    Note Initiated On: 2024 1:51 PM

## 2024-09-07 LAB
ANION GAP SERPL CALC-SCNC: 10 MMOL/L — SIGNIFICANT CHANGE UP (ref 7–14)
ANION GAP SERPL CALC-SCNC: 12 MMOL/L — SIGNIFICANT CHANGE UP (ref 7–14)
BUN SERPL-MCNC: 6 MG/DL — LOW (ref 7–23)
BUN SERPL-MCNC: 6 MG/DL — LOW (ref 7–23)
CALCIUM SERPL-MCNC: 8.9 MG/DL — SIGNIFICANT CHANGE UP (ref 8.4–10.5)
CALCIUM SERPL-MCNC: 9.4 MG/DL — SIGNIFICANT CHANGE UP (ref 8.4–10.5)
CHLORIDE SERPL-SCNC: 109 MMOL/L — HIGH (ref 98–107)
CHLORIDE SERPL-SCNC: 111 MMOL/L — HIGH (ref 98–107)
CO2 SERPL-SCNC: 20 MMOL/L — LOW (ref 22–31)
CO2 SERPL-SCNC: 22 MMOL/L — SIGNIFICANT CHANGE UP (ref 22–31)
CREAT SERPL-MCNC: 0.95 MG/DL — SIGNIFICANT CHANGE UP (ref 0.5–1.3)
CREAT SERPL-MCNC: 1.07 MG/DL — SIGNIFICANT CHANGE UP (ref 0.5–1.3)
EGFR: 79 ML/MIN/1.73M2 — SIGNIFICANT CHANGE UP
EGFR: 91 ML/MIN/1.73M2 — SIGNIFICANT CHANGE UP
GLUCOSE SERPL-MCNC: 106 MG/DL — HIGH (ref 70–99)
GLUCOSE SERPL-MCNC: 98 MG/DL — SIGNIFICANT CHANGE UP (ref 70–99)
HCT VFR BLD CALC: 23.8 % — LOW (ref 39–50)
HGB BLD-MCNC: 7.9 G/DL — LOW (ref 13–17)
MAGNESIUM SERPL-MCNC: 1.9 MG/DL — SIGNIFICANT CHANGE UP (ref 1.6–2.6)
MAGNESIUM SERPL-MCNC: 2.1 MG/DL — SIGNIFICANT CHANGE UP (ref 1.6–2.6)
MCHC RBC-ENTMCNC: 32.8 PG — SIGNIFICANT CHANGE UP (ref 27–34)
MCHC RBC-ENTMCNC: 33.2 GM/DL — SIGNIFICANT CHANGE UP (ref 32–36)
MCV RBC AUTO: 98.8 FL — SIGNIFICANT CHANGE UP (ref 80–100)
NRBC # BLD: 0 /100 WBCS — SIGNIFICANT CHANGE UP (ref 0–0)
NRBC # FLD: 0.02 K/UL — HIGH (ref 0–0)
PHOSPHATE SERPL-MCNC: 2.8 MG/DL — SIGNIFICANT CHANGE UP (ref 2.5–4.5)
PHOSPHATE SERPL-MCNC: 2.8 MG/DL — SIGNIFICANT CHANGE UP (ref 2.5–4.5)
PLATELET # BLD AUTO: 308 K/UL — SIGNIFICANT CHANGE UP (ref 150–400)
POTASSIUM SERPL-MCNC: 2.9 MMOL/L — CRITICAL LOW (ref 3.5–5.3)
POTASSIUM SERPL-MCNC: 3.6 MMOL/L — SIGNIFICANT CHANGE UP (ref 3.5–5.3)
POTASSIUM SERPL-SCNC: 2.9 MMOL/L — CRITICAL LOW (ref 3.5–5.3)
POTASSIUM SERPL-SCNC: 3.6 MMOL/L — SIGNIFICANT CHANGE UP (ref 3.5–5.3)
RBC # BLD: 2.41 M/UL — LOW (ref 4.2–5.8)
RBC # FLD: 25 % — HIGH (ref 10.3–14.5)
SODIUM SERPL-SCNC: 141 MMOL/L — SIGNIFICANT CHANGE UP (ref 135–145)
SODIUM SERPL-SCNC: 143 MMOL/L — SIGNIFICANT CHANGE UP (ref 135–145)
WBC # BLD: 15.16 K/UL — HIGH (ref 3.8–10.5)
WBC # FLD AUTO: 15.16 K/UL — HIGH (ref 3.8–10.5)

## 2024-09-07 PROCEDURE — 99232 SBSQ HOSP IP/OBS MODERATE 35: CPT | Mod: GC

## 2024-09-07 RX ORDER — POTASSIUM CHLORIDE 10 MEQ
10 TABLET, EXT RELEASE, PARTICLES/CRYSTALS ORAL
Refills: 0 | Status: COMPLETED | OUTPATIENT
Start: 2024-09-07 | End: 2024-09-07

## 2024-09-07 RX ORDER — POTASSIUM CHLORIDE 10 MEQ
40 TABLET, EXT RELEASE, PARTICLES/CRYSTALS ORAL ONCE
Refills: 0 | Status: COMPLETED | OUTPATIENT
Start: 2024-09-07 | End: 2024-09-07

## 2024-09-07 RX ORDER — POTASSIUM CHLORIDE 10 MEQ
40 TABLET, EXT RELEASE, PARTICLES/CRYSTALS ORAL DAILY
Refills: 0 | Status: DISCONTINUED | OUTPATIENT
Start: 2024-09-07 | End: 2024-09-07

## 2024-09-07 RX ADMIN — LITHIUM CARBONATE 600 MILLIGRAM(S): 150 CAPSULE ORAL at 21:01

## 2024-09-07 RX ADMIN — Medication 100 MILLIEQUIVALENT(S): at 09:12

## 2024-09-07 RX ADMIN — Medication 40 MILLIEQUIVALENT(S): at 06:09

## 2024-09-07 RX ADMIN — Medication 100 MILLIGRAM(S): at 21:01

## 2024-09-07 RX ADMIN — Medication 100 MILLIEQUIVALENT(S): at 06:17

## 2024-09-07 RX ADMIN — AMLODIPINE BESYLATE 5 MILLIGRAM(S): 10 TABLET ORAL at 06:09

## 2024-09-07 RX ADMIN — Medication 40 MILLIGRAM(S): at 06:09

## 2024-09-07 RX ADMIN — Medication 5000 UNIT(S): at 14:26

## 2024-09-07 RX ADMIN — Medication 40 MILLIEQUIVALENT(S): at 21:01

## 2024-09-07 RX ADMIN — CHLORHEXIDINE GLUCONATE 1 APPLICATION(S): 40 SOLUTION TOPICAL at 11:54

## 2024-09-07 RX ADMIN — Medication 30 MILLIGRAM(S): at 09:05

## 2024-09-07 RX ADMIN — CHLORHEXIDINE GLUCONATE 15 MILLILITER(S): 40 SOLUTION TOPICAL at 17:39

## 2024-09-07 RX ADMIN — LAMOTRIGINE 100 MILLIGRAM(S): 100 TABLET, EXTENDED RELEASE ORAL at 21:01

## 2024-09-07 RX ADMIN — FLUCONAZOLE 100 MILLIGRAM(S): 150 TABLET ORAL at 06:10

## 2024-09-07 RX ADMIN — SUCRALFATE 1 GRAM(S): 1 SUSPENSION ORAL at 06:18

## 2024-09-07 RX ADMIN — SUCRALFATE 1 GRAM(S): 1 SUSPENSION ORAL at 11:54

## 2024-09-07 RX ADMIN — Medication 100 MILLIEQUIVALENT(S): at 08:12

## 2024-09-07 RX ADMIN — SUCRALFATE 1 GRAM(S): 1 SUSPENSION ORAL at 00:56

## 2024-09-07 RX ADMIN — PIPERACILLIN SODIUM AND TAZOBACTAM SODIUM 25 GRAM(S): 3; .375 INJECTION, POWDER, FOR SOLUTION INTRAVENOUS at 06:10

## 2024-09-07 RX ADMIN — SUCRALFATE 1 GRAM(S): 1 SUSPENSION ORAL at 17:39

## 2024-09-07 RX ADMIN — METHYLPHENIDATE HYDROCHLORIDE 72 MILLIGRAM(S): 72 TABLET, EXTENDED RELEASE ORAL at 06:09

## 2024-09-07 RX ADMIN — CHLORHEXIDINE GLUCONATE 15 MILLILITER(S): 40 SOLUTION TOPICAL at 06:10

## 2024-09-07 RX ADMIN — METOPROLOL TARTRATE 25 MILLIGRAM(S): 100 TABLET ORAL at 06:09

## 2024-09-07 RX ADMIN — Medication 40 MILLIGRAM(S): at 17:39

## 2024-09-07 RX ADMIN — METOPROLOL TARTRATE 25 MILLIGRAM(S): 100 TABLET ORAL at 17:39

## 2024-09-07 RX ADMIN — Medication 5000 UNIT(S): at 06:09

## 2024-09-07 RX ADMIN — PIPERACILLIN SODIUM AND TAZOBACTAM SODIUM 25 GRAM(S): 3; .375 INJECTION, POWDER, FOR SOLUTION INTRAVENOUS at 14:25

## 2024-09-07 RX ADMIN — Medication 10 MILLIGRAM(S): at 09:05

## 2024-09-07 RX ADMIN — Medication 5000 UNIT(S): at 21:01

## 2024-09-07 RX ADMIN — PIPERACILLIN SODIUM AND TAZOBACTAM SODIUM 25 GRAM(S): 3; .375 INJECTION, POWDER, FOR SOLUTION INTRAVENOUS at 21:01

## 2024-09-07 NOTE — PROGRESS NOTE ADULT - ASSESSMENT
59M with HTN, HLD, unspecified cognitive disorder. POD9 s/p I&D sublingual abscess, progressing well in SICU. Improved swelling and FOM soft, non tender. Wound cx: Staph epidermidis.    PLAN:  - C/w Zosyn.   - Multimodal pain management   - Peridex mouthwash BID  - OMFS anticipating stepdown    Robert Nj  Oral and Maxillofacial Surgery  [facility] Pager #[phone]  [facility] Pager: [phone]  Available on Teams

## 2024-09-07 NOTE — PROGRESS NOTE ADULT - ATTENDING SUPERVISION STATEMENT
Resident
Fellow
Resident

## 2024-09-07 NOTE — PROGRESS NOTE ADULT - SUBJECTIVE AND OBJECTIVE BOX
Patient is a 59y Male w/ pmhx of HTN, HLD, unspecified cognitive disorder. POD9 s/p I&D sublingual abscess.     Intervals:  Cardiac stable over night, GI cauterize large GI ulcer likely cause of bleed, H&H stable, passed SLP eval on to angelina    ROS     Neuro  AAOx2-3, more conversational today that yesterday  pain controlled     Resp  - Tolerating RA well    Card  - MAP >65, improved control of BP noted  - home statin 10 QD  - metoprolol 25mg BID  - amlodipine 5mg QD    GI  - Diet: passed bedside SS > CLD   - Protonix 40  - GI bleed cauterized   - Gastroenterology Service recs: Continue protonix BID 8 weeks, then once daily, sucralfate suspension 1 g PO QID for 7 days for NGT trauma, and diet as tolerated       - TOV-->passed    Heme  - DVT ppx: SCD, Heparin       ID  - Diflucan (9/4-  - Zosyn (9/1-  - Unasyn (8/28-9/1)  - Bcx from [facility]: streptococcus sensitive to pcn  - f/u BCx drawn 9/3, NGTD at 48hrs    Endocrine  - Monitor glucose  - Decadron d/c'ed 9/2     Patient is a 59y Male w/ pmhx of HTN, HLD, unspecified cognitive disorder. POD9 s/p I&D sublingual abscess.     Intervals:  Cardiac stable over night, H&H stable, passed SLP eval, transitioned to FLD    Vitals:     Vital Signs Last 24 Hrs  T(C): 36.1 (07 Sep 2024 04:00), Max: 37.1 (06 Sep 2024 12:00)  T(F): 97 (07 Sep 2024 04:00), Max: 98.8 (06 Sep 2024 12:00)  HR: 76 (07 Sep 2024 07:00) (64 - 102)  BP: 144/93 (07 Sep 2024 07:00) (116/78 - 168/81)  BP(mean): 107 (07 Sep 2024 07:00) (86 - 117)  RR: 18 (07 Sep 2024 07:00) (15 - 24)  SpO2: 100% (07 Sep 2024 07:00) (96% - 100%)    Parameters below as of 07 Sep 2024 07:00  Patient On (Oxygen Delivery Method): room air    I&O's Detail    06 Sep 2024 07:01  -  07 Sep 2024 07:00  --------------------------------------------------------  IN:    dextrose 5% + lactated ringers: 150 mL    dextrose 5% + lactated ringers: 570 mL    IV PiggyBack: 175 mL    IV PiggyBack: 100 mL  Total IN: 995 mL    OUT:    Incontinent per Condom Catheter (mL): 2865 mL  Total OUT: 2865 mL    Total NET: -1870 mL    Medications:    MEDICATIONS  (STANDING):  amLODIPine   Tablet 5 milliGRAM(s) Oral daily  atorvastatin 10 milliGRAM(s) Oral with breakfast  chlorhexidine 0.12% Liquid 15 milliLiter(s) Swish and Spit two times a day  chlorhexidine 2% Cloths 1 Application(s) Topical daily  citalopram 30 milliGRAM(s) Oral with breakfast  dextrose 5% + lactated ringers. 1000 milliLiter(s) (30 mL/Hr) IV Continuous <Continuous>  fluconAZOLE IVPB 200 milliGRAM(s) IV Intermittent every 24 hours  heparin   Injectable 5000 Unit(s) SubCutaneous every 8 hours  lamoTRIgine 100 milliGRAM(s) Oral at bedtime  lithium 600 milliGRAM(s) Oral at bedtime  methylphenidate ER (CONCERTA) 72 milliGRAM(s) Oral every morning  metoprolol tartrate 25 milliGRAM(s) Oral two times a day  pantoprazole  Injectable 40 milliGRAM(s) IV Push two times a day  piperacillin/tazobactam IVPB.. 3.375 Gram(s) IV Intermittent every 8 hours  potassium chloride  10 mEq/100 mL IVPB 10 milliEquivalent(s) IV Intermittent every 1 hour  QUEtiapine 100 milliGRAM(s) Oral at bedtime  sucralfate suspension 1 Gram(s) Oral four times a day    MEDICATIONS  (PRN):      Labs:                          7.9    15.16 )-----------( 308      ( 07 Sep 2024 01:50 )             23.8       09-07    143  |  109<H>  |  6<L>  ----------------------------<  106<H>  2.9<LL>   |  22  |  0.95    Ca    8.9      07 Sep 2024 01:50  Phos  2.8     09-07  Mg     1.90     09-07        ROS     Neuro  AAOx2-3, more conversational today that yesterday  pain controlled     Resp  - Tolerating RA well    Card  - MAP >65, improved control of BP noted  - home statin 10 QD  - metoprolol 25mg BID  - amlodipine 5mg QD    GI  - Diet: passed bedside SS > CLD   - Protonix 40  - GI bleed cauterized   - Gastroenterology Service recs: Continue protonix BID 8 weeks, then once daily, sucralfate suspension 1 g PO QID for 7 days for NGT trauma, and diet as tolerated       - TOV-->passed    Heme  - DVT ppx: SCD, Heparin       ID  - Diflucan (9/4-  - Zosyn (9/1-  - Unasyn (8/28-9/1)  - Bcx from [facility]: streptococcus sensitive to pcn  - f/u BCx drawn 9/3, NGTD at 48hrs    Endocrine  - Monitor glucose  - Decadron d/c'ed 9/2

## 2024-09-07 NOTE — PROGRESS NOTE ADULT - ASSESSMENT
59M with HTN, HLD, unspecified cognitive disorder. Transferred from [facility] with worsening sublingual swelling. Pt seen at [facility] on Aug 26th for the same complaint, bedside I&D of sublingual space done with IV Unasyn. Pt discharged the following day with PO Augmentin, however pt denied taking his PO abx after discharge. Pt unable to eat nor drink much because of the pain and difficulty swallowing and returning to hospital. Patient received OR I&D of sublingual area and experienced considerable swelling in the airway, transferred to SICU intubated to protect airway and vent management.      PLAN  Neuro  - A&Ox2-3, oriented to self, more fully to place and time (hospital in [place], September 2024)  - Pain control: Tylenol  - home psych meds - citalopram, lamictal, lithium, methylphenidate --> tolerating oral  - QTc  424     Resp  - Extubated to NC--> breathing comfortable RA  - sublingual edema v hematoma.     Card  - MAP >65  - home statin 10 QD  - metoprolol 25mg BID  - amlodipine 5mg QD    GI  - Diet: passed bedside SS > CLD > progress to FLD 9/6  - Protonix 40 BID   - sucralfate 1g      - monitor urine output  - IVL, dc maintenance fluids given tolerance to PO  - TOV-->passed, Crocker out    Heme  - DVT ppx: SCD and SQH  - Trend CBC    ID  - Diflucan (9/4-         - Typically stopped 14 days after negative BCx i/s/o Candidemia. Pt has not had + BCx for Candida during his stay. Risk benefit for risk of QT prolongation vs incomplete treatment of potential fungemia. Pt is currently on continuous EKG monitoring to recognize QT prolongation  - Zosyn (9/1-         - 10 day course for uncomplicated Pseudomonal sputum cultures  - Unasyn (8/28-9/1)  - Bcx from [facility]: streptococcus sensitive to pcn  - f/u BCx drawn 9/3, NGTD at 48hrs  - TTE ordered for strep bacteremia at [facility]     Endocrine  - Monitor glucose  - Decadron d/c'ed 9/2    LINES/TUBES/DRAINS  - PIV    Disposition: SICU

## 2024-09-07 NOTE — PROGRESS NOTE ADULT - SUBJECTIVE AND OBJECTIVE BOX
SICU Daily Progress Note  =====================================================  Interval/Overnight Events:   - NAOE  - IVL  - s/p EGD > large duodenal ulcer s/p cauterized  - monitor for signs of bleeding, talk to GI about risk of rebleeding (Ken Class 2A - visible non-bleeding vessel in ulcer - 42% chance rebleed)  - Continue protonix BID for 8 weeks then once daily, sucralfate suspension 1 gram PO QID for 7 days for NGT trauma, and diet as tolerated  - FLD        MEDICATIONS:   --------------------------------------------------------------------------------------  amLODIPine   Tablet 5 milliGRAM(s) Oral daily  atorvastatin 10 milliGRAM(s) Oral with breakfast  chlorhexidine 0.12% Liquid 15 milliLiter(s) Swish and Spit two times a day  chlorhexidine 2% Cloths 1 Application(s) Topical daily  citalopram 30 milliGRAM(s) Oral with breakfast  dextrose 5% + lactated ringers. 1000 milliLiter(s) IV Continuous <Continuous>  fluconAZOLE IVPB 200 milliGRAM(s) IV Intermittent every 24 hours  heparin   Injectable 5000 Unit(s) SubCutaneous every 8 hours  lamoTRIgine 100 milliGRAM(s) Oral at bedtime  lithium 600 milliGRAM(s) Oral at bedtime  methylphenidate ER (CONCERTA) 72 milliGRAM(s) Oral every morning  metoprolol tartrate 25 milliGRAM(s) Oral two times a day  pantoprazole  Injectable 40 milliGRAM(s) IV Push two times a day  piperacillin/tazobactam IVPB.. 3.375 Gram(s) IV Intermittent every 8 hours  QUEtiapine 100 milliGRAM(s) Oral at bedtime  sucralfate suspension 1 Gram(s) Oral four times a day    --------------------------------------------------------------------------------------    VITAL SIGNS, INS/OUTS (last 24 hours):  --------------------------------------------------------------------------------------  T(C): 36.3 (09-06-24 @ 20:00), Max: 37.1 (09-06-24 @ 04:00)  HR: 87 (09-07-24 @ 00:00) (62 - 102)  BP: 140/77 (09-07-24 @ 00:00) (116/78 - 187/90)  RR: 21 (09-07-24 @ 00:00) (15 - 24)  SpO2: 100% (09-07-24 @ 00:00) (96% - 100%)      09-05-24 @ 07:01  -  09-06-24 @ 07:00  --------------------------------------------------------  IN: 2425 mL / OUT: 2525 mL / NET: -100 mL    09-06-24 @ 07:01  -  09-07-24 @ 00:23  --------------------------------------------------------  IN: 845 mL / OUT: 2415 mL / NET: -1570 mL      --------------------------------------------------------------------------------------    EXAM  NEURO: NAD,   HEENT: NC/AT  RESPIRATORY: nonlabored respirations, normal CW expansion  CARDIO: RRR  ABDOMEN: soft, nontender, nondistended  EXTREMITIES: normal strength, no deformities noted    TUBES/LINES/DRAINS  [x] Peripheral IV  [] Central Venous Line     	[] R	[] L	[] IJ	[] Fem	[] SC	Date Placed:   [] Arterial Line		[] R	[] L	[] Fem	[] Rad	[] Ax	Date Placed:   [] PICC		[] Midline		[] Mediport  [] Urinary Catheter		Date Placed:     LABS  --------------------------------------------------------------------------------------    --------------------------------------------------------------------------------------    IMAGING STUDIES:

## 2024-09-07 NOTE — PROGRESS NOTE ADULT - ATTENDING COMMENTS
I agree with the history, physical, and plan, which I have reviewed and edited where appropriate.  I agree with notes/assessment of health care providers on my service.  I have personally examined the patient.  I was physically present for the key portions of the evaluation and management (E/M) service provided.  I reviewed data and laboratory tests/x-rays and all pertinent electronic images.  The patient is a critical care patient with life threatening hemodynamic and metabolic instability in SICU.  Risk benefit analyses discussed.    The patient is in SICU with diagnosis mentioned in the note.    The plan is specified below.    59M with HTN, HLD, unspecified cognitive disorder. Transferred from [facility] with worsening sublingual swelling. Pt seen at [facility] on Aug 26th for the same complaint, bedside I&D of sublingual space done with IV Unasyn. Pt discharged the following day with PO Augmentin, however pt denied taking his PO abx after discharge. Pt unable to eat nor drink much because of the pain and difficulty swallowing and returning to hospital. Patient received OR I&D of sublingual area and experienced considerable swelling in the airway, transferred to SICU intubated to protect airway and vent management.  9/3 NGT inserted for decompression, patient extubated, currently on Room air. PT with UGIB s/p EGD and coagulation of duodenal ulcer with visible vessel at base. No further bleeding x 48h. Stable for transfer to floor.     PLAN  Neuro: h/o bipolar   - Pain control: Tylenol  - home psych meds - citalopram, lamictal, lithium, methylphenidate      Resp: s/p incision and drainage of sublingual abscess   - on RA    Card: h/o htn   - home statin 10 QD  - metoprolol 25mg BID  - amlodipine 5mg QD    GI: UGIB s/p EGD/Cauterization of duodenal vessel   - Diet: progress to soft diet   - Protonix 40 BID for 8wks, then once daily;  - Sucralfate 1g QID for 7 days      - IVL  - voiding    Heme  - DVT ppx: SCD and SQH    ID  - Diflucan (9/4- ) started for empiric coverage of possible Candida in abscess, complete on 9/10  - Zosyn (9/1- for strep bacteremia and pseudomonas in sputum, until 9/10   - Bcx from [facility]: streptococcus intermedius, complete 14d abx on 9/10, no need for echo, low endocarditis risk     Endocrine  - Monitor glucose.

## 2024-09-08 LAB
ANION GAP SERPL CALC-SCNC: 14 MMOL/L — SIGNIFICANT CHANGE UP (ref 7–14)
BUN SERPL-MCNC: 8 MG/DL — SIGNIFICANT CHANGE UP (ref 7–23)
CALCIUM SERPL-MCNC: 9.8 MG/DL — SIGNIFICANT CHANGE UP (ref 8.4–10.5)
CHLORIDE SERPL-SCNC: 108 MMOL/L — HIGH (ref 98–107)
CO2 SERPL-SCNC: 20 MMOL/L — LOW (ref 22–31)
CREAT SERPL-MCNC: 1.21 MG/DL — SIGNIFICANT CHANGE UP (ref 0.5–1.3)
CULTURE RESULTS: SIGNIFICANT CHANGE UP
CULTURE RESULTS: SIGNIFICANT CHANGE UP
EGFR: 68 ML/MIN/1.73M2 — SIGNIFICANT CHANGE UP
GLUCOSE SERPL-MCNC: 249 MG/DL — HIGH (ref 70–99)
HCT VFR BLD CALC: 25.8 % — LOW (ref 39–50)
HCT VFR BLD CALC: 26 % — LOW (ref 39–50)
HGB BLD-MCNC: 8.5 G/DL — LOW (ref 13–17)
HGB BLD-MCNC: 8.6 G/DL — LOW (ref 13–17)
MAGNESIUM SERPL-MCNC: 2.2 MG/DL — SIGNIFICANT CHANGE UP (ref 1.6–2.6)
MCHC RBC-ENTMCNC: 32.7 GM/DL — SIGNIFICANT CHANGE UP (ref 32–36)
MCHC RBC-ENTMCNC: 33.1 PG — SIGNIFICANT CHANGE UP (ref 27–34)
MCHC RBC-ENTMCNC: 33.3 GM/DL — SIGNIFICANT CHANGE UP (ref 32–36)
MCHC RBC-ENTMCNC: 33.7 PG — SIGNIFICANT CHANGE UP (ref 27–34)
MCV RBC AUTO: 101.2 FL — HIGH (ref 80–100)
MCV RBC AUTO: 101.2 FL — HIGH (ref 80–100)
NRBC # BLD: 0 /100 WBCS — SIGNIFICANT CHANGE UP (ref 0–0)
NRBC # BLD: 0 /100 WBCS — SIGNIFICANT CHANGE UP (ref 0–0)
NRBC # FLD: 0 K/UL — SIGNIFICANT CHANGE UP (ref 0–0)
NRBC # FLD: 0 K/UL — SIGNIFICANT CHANGE UP (ref 0–0)
PHOSPHATE SERPL-MCNC: 3.2 MG/DL — SIGNIFICANT CHANGE UP (ref 2.5–4.5)
PLATELET # BLD AUTO: 366 K/UL — SIGNIFICANT CHANGE UP (ref 150–400)
PLATELET # BLD AUTO: 403 K/UL — HIGH (ref 150–400)
POTASSIUM SERPL-MCNC: 4.4 MMOL/L — SIGNIFICANT CHANGE UP (ref 3.5–5.3)
POTASSIUM SERPL-SCNC: 4.4 MMOL/L — SIGNIFICANT CHANGE UP (ref 3.5–5.3)
RBC # BLD: 2.55 M/UL — LOW (ref 4.2–5.8)
RBC # BLD: 2.57 M/UL — LOW (ref 4.2–5.8)
RBC # FLD: 24.1 % — HIGH (ref 10.3–14.5)
RBC # FLD: 24.2 % — HIGH (ref 10.3–14.5)
SODIUM SERPL-SCNC: 142 MMOL/L — SIGNIFICANT CHANGE UP (ref 135–145)
SPECIMEN SOURCE: SIGNIFICANT CHANGE UP
SPECIMEN SOURCE: SIGNIFICANT CHANGE UP
WBC # BLD: 15.46 K/UL — HIGH (ref 3.8–10.5)
WBC # BLD: 16.46 K/UL — HIGH (ref 3.8–10.5)
WBC # FLD AUTO: 15.46 K/UL — HIGH (ref 3.8–10.5)
WBC # FLD AUTO: 16.46 K/UL — HIGH (ref 3.8–10.5)

## 2024-09-08 RX ORDER — PIPERACILLIN SODIUM AND TAZOBACTAM SODIUM 3; .375 G/15ML; G/15ML
3.38 INJECTION, POWDER, FOR SOLUTION INTRAVENOUS EVERY 8 HOURS
Refills: 0 | Status: DISCONTINUED | OUTPATIENT
Start: 2024-09-08 | End: 2024-09-08

## 2024-09-08 RX ORDER — METHYLPHENIDATE HYDROCHLORIDE 72 MG/1
72 TABLET, EXTENDED RELEASE ORAL EVERY MORNING
Refills: 0 | Status: DISCONTINUED | OUTPATIENT
Start: 2024-09-08 | End: 2024-09-10

## 2024-09-08 RX ORDER — PIPERACILLIN SODIUM AND TAZOBACTAM SODIUM 3; .375 G/15ML; G/15ML
3.38 INJECTION, POWDER, FOR SOLUTION INTRAVENOUS EVERY 8 HOURS
Refills: 0 | Status: DISCONTINUED | OUTPATIENT
Start: 2024-09-08 | End: 2024-09-10

## 2024-09-08 RX ORDER — HALOPERIDOL 1 MG
5 TABLET ORAL ONCE
Refills: 0 | Status: DISCONTINUED | OUTPATIENT
Start: 2024-09-08 | End: 2024-09-08

## 2024-09-08 RX ORDER — HALOPERIDOL 1 MG
2 TABLET ORAL ONCE
Refills: 0 | Status: COMPLETED | OUTPATIENT
Start: 2024-09-08 | End: 2024-09-08

## 2024-09-08 RX ORDER — HALOPERIDOL 1 MG
5 TABLET ORAL ONCE
Refills: 0 | Status: COMPLETED | OUTPATIENT
Start: 2024-09-08 | End: 2024-09-08

## 2024-09-08 RX ADMIN — LITHIUM CARBONATE 600 MILLIGRAM(S): 150 CAPSULE ORAL at 21:17

## 2024-09-08 RX ADMIN — LAMOTRIGINE 100 MILLIGRAM(S): 100 TABLET, EXTENDED RELEASE ORAL at 21:17

## 2024-09-08 RX ADMIN — Medication 40 MILLIGRAM(S): at 06:55

## 2024-09-08 RX ADMIN — AMLODIPINE BESYLATE 5 MILLIGRAM(S): 10 TABLET ORAL at 06:55

## 2024-09-08 RX ADMIN — METHYLPHENIDATE HYDROCHLORIDE 72 MILLIGRAM(S): 72 TABLET, EXTENDED RELEASE ORAL at 06:55

## 2024-09-08 RX ADMIN — SUCRALFATE 1 GRAM(S): 1 SUSPENSION ORAL at 06:55

## 2024-09-08 RX ADMIN — Medication 5000 UNIT(S): at 13:22

## 2024-09-08 RX ADMIN — Medication 30 MILLIGRAM(S): at 09:01

## 2024-09-08 RX ADMIN — Medication 5000 UNIT(S): at 21:18

## 2024-09-08 RX ADMIN — METOPROLOL TARTRATE 25 MILLIGRAM(S): 100 TABLET ORAL at 17:01

## 2024-09-08 RX ADMIN — Medication 40 MILLIGRAM(S): at 17:01

## 2024-09-08 RX ADMIN — Medication 100 MILLIGRAM(S): at 22:35

## 2024-09-08 RX ADMIN — CHLORHEXIDINE GLUCONATE 15 MILLILITER(S): 40 SOLUTION TOPICAL at 06:55

## 2024-09-08 RX ADMIN — FLUCONAZOLE 100 MILLIGRAM(S): 150 TABLET ORAL at 06:55

## 2024-09-08 RX ADMIN — CHLORHEXIDINE GLUCONATE 15 MILLILITER(S): 40 SOLUTION TOPICAL at 17:04

## 2024-09-08 RX ADMIN — Medication 5000 UNIT(S): at 06:55

## 2024-09-08 RX ADMIN — PIPERACILLIN SODIUM AND TAZOBACTAM SODIUM 25 GRAM(S): 3; .375 INJECTION, POWDER, FOR SOLUTION INTRAVENOUS at 21:18

## 2024-09-08 RX ADMIN — METOPROLOL TARTRATE 25 MILLIGRAM(S): 100 TABLET ORAL at 06:55

## 2024-09-08 RX ADMIN — SUCRALFATE 1 GRAM(S): 1 SUSPENSION ORAL at 11:17

## 2024-09-08 RX ADMIN — SUCRALFATE 1 GRAM(S): 1 SUSPENSION ORAL at 17:01

## 2024-09-08 RX ADMIN — PIPERACILLIN SODIUM AND TAZOBACTAM SODIUM 25 GRAM(S): 3; .375 INJECTION, POWDER, FOR SOLUTION INTRAVENOUS at 13:21

## 2024-09-08 RX ADMIN — PIPERACILLIN SODIUM AND TAZOBACTAM SODIUM 25 GRAM(S): 3; .375 INJECTION, POWDER, FOR SOLUTION INTRAVENOUS at 06:55

## 2024-09-08 RX ADMIN — Medication 2 MILLIGRAM(S): at 21:18

## 2024-09-08 RX ADMIN — Medication 10 MILLIGRAM(S): at 09:01

## 2024-09-08 RX ADMIN — Medication 5 MILLIGRAM(S): at 03:32

## 2024-09-08 NOTE — PROGRESS NOTE ADULT - ASSESSMENT
daily    Historical Provider, MD   meclizine (ANTIVERT) 25 MG tablet Take 50 mg by mouth 3 times daily as needed     Historical Provider, MD   spironolactone (ALDACTONE) 25 MG tablet Take 25 mg by mouth daily    Historical Provider, MD   traZODone (DESYREL) 50 MG tablet Take 50 mg by mouth nightly Indications: 1/2 tab     Historical Provider, MD   Omega-3 Fatty Acids (FISH OIL) 1000 MG CAPS Take 3,000 mg by mouth daily    Historical Provider, MD   Ascorbic Acid (VITAMIN C) 500 MG tablet Take 1,000 mg by mouth daily    Historical Provider, MD   Cyanocobalamin (VITAMIN B 12 PO) Take 500 mg by mouth daily     Historical Provider, MD   busPIRone (BUSPAR) 30 MG tablet Take 30 mg by mouth 3 times daily  10/14/16   Historical Provider, MD   pantoprazole (PROTONIX) 40 MG tablet Take 1 tablet by mouth daily 8/18/16   Seema Fuentes MD   glucose blood VI test strips (FREESTYLE LITE) strip Check BS 4 times a day 4/5/16   Lani Horan MD   Blood Glucose Monitoring Suppl (FREESTYLE LITE) EMILIE 1 Device by Does not apply route daily as needed 4/5/16   Lani Horan MD   aspirin 81 MG tablet Take 81 mg by mouth daily    Historical Provider, MD   Multiple Vitamins-Minerals (MULTI FOR HER PO) Take by mouth daily    Historical Provider, MD   atorvastatin (LIPITOR) 40 MG tablet Take 40 mg by mouth daily    Historical Provider, MD   ferrous sulfate 325 (65 FE) MG tablet Take 325 mg by mouth daily (with breakfast)    Historical Provider, MD   fenofibrate (TRICOR) 145 MG tablet Take 145 mg by mouth daily    Historical Provider, MD   Levothyroxine Sodium (SYNTHROID PO) Take 150 mcg by mouth 6 days a week    Historical Provider, MD   DOXEPIN HCL PO Take 200 mg by mouth nightly.     Historical Provider, MD   lamoTRIgine (LAMICTAL) 100 MG tablet   Take by mouth daily 200MG AM AND 100MG AT SUPPER    Historical Provider, MD   metFORMIN (GLUCOPHAGE) 1000 MG tablet Take 1,000 mg by mouth 2 times daily (with meals) Indications: 1 tab if hr is >60 59M with HTN, HLD, unspecified cognitive disorder. POD10 s/p I&D sublingual abscess, progressing well in SICU. Improved swelling and FOM soft, non tender. Wound cx: Staph epidermidis.    PLAN:  - C/w Zosyn.   - Multimodal pain management   - Peridex mouthwash BID  - OMFS anticipating stepdown    Mason De La Cruz  Oral and Maxillofacial Surgery  [facility] Pager #[phone]  [facility] Pager: [phone]  Available on Teams   59M with HTN, HLD, unspecified cognitive disorder. POD10 s/p I&D sublingual abscess, progressing well in SICU. Improved swelling and FOM soft, non tender. Wound cx: Staph epidermidis. Downgraded from SICU on 9/7.     PLAN:  - C/w Zosyn (9/1-9/11) and diflucan (9/4-9/18)  - Protonix and Sucralfate for GI bleed   - Multimodal pain management   - Peridex mouthwash BID  - Home meds   - Pending MECHE De La Cruz  Oral and Maxillofacial Surgery  [facility] Pager #[phone]  [facility] Pager: [phone]  Available on Teams

## 2024-09-08 NOTE — PROGRESS NOTE ADULT - SUBJECTIVE AND OBJECTIVE BOX
Patient is a 59y Male w/ pmhx of HTN, HLD, unspecified cognitive disorder. POD10 s/p I&D sublingual abscess.     Intervals:  Cardiac stable over night, H&H stable, Patient has a BRT called due to patient constantly getting out of bed, 5mg of Haldol was given patient stable   Patient is a 59y Male w/ pmhx of HTN, HLD, unspecified cognitive disorder. POD10 s/p I&D sublingual abscess. patient downgraded to Floor under OMFS care, Patient tolerating PO    Intervals:  Cardiac stable over night, H&H stable, Patient has a BRT called due to patient constantly getting out of bed, 5mg of Haldol was given patient stable after no other events    IOE: FOM s/nt/ne, no s/s of infection, drain site granulating and closing    Vitals:     Vital Signs Last 24 Hrs  T(C): 37.3 (08 Sep 2024 06:45), Max: 37.3 (08 Sep 2024 06:45)  T(F): 99.1 (08 Sep 2024 06:45), Max: 99.1 (08 Sep 2024 06:45)  HR: 82 (08 Sep 2024 06:45) (68 - 85)  BP: 142/78 (08 Sep 2024 06:45) (112/69 - 161/83)  BP(mean): 108 (07 Sep 2024 20:00) (83 - 108)  RR: 17 (08 Sep 2024 06:45) (15 - 20)  SpO2: 100% (08 Sep 2024 06:45) (95% - 100%)    Parameters below as of 08 Sep 2024 06:45  Patient On (Oxygen Delivery Method): room air        I&O's Detail    07 Sep 2024 07:01  -  08 Sep 2024 07:00  --------------------------------------------------------  IN:    dextrose 5% + lactated ringers: 30 mL    IV PiggyBack: 200 mL    IV PiggyBack: 100 mL    Oral Fluid: 360 mL  Total IN: 690 mL    OUT:    Incontinent per Condom Catheter (mL): 2500 mL  Total OUT: 2500 mL    Total NET: -1810 mL          Medications:    MEDICATIONS  (STANDING):  amLODIPine   Tablet 5 milliGRAM(s) Oral daily  atorvastatin 10 milliGRAM(s) Oral with breakfast  chlorhexidine 0.12% Liquid 15 milliLiter(s) Swish and Spit two times a day  chlorhexidine 2% Cloths 1 Application(s) Topical daily  citalopram 30 milliGRAM(s) Oral with breakfast  fluconAZOLE IVPB 200 milliGRAM(s) IV Intermittent every 24 hours  heparin   Injectable 5000 Unit(s) SubCutaneous every 8 hours  lamoTRIgine 100 milliGRAM(s) Oral at bedtime  lithium 600 milliGRAM(s) Oral at bedtime  methylphenidate ER (CONCERTA) 72 milliGRAM(s) Oral every morning  metoprolol tartrate 25 milliGRAM(s) Oral two times a day  pantoprazole  Injectable 40 milliGRAM(s) IV Push two times a day  piperacillin/tazobactam IVPB.. 3.375 Gram(s) IV Intermittent every 8 hours  QUEtiapine 100 milliGRAM(s) Oral at bedtime  sucralfate suspension 1 Gram(s) Oral four times a day    MEDICATIONS  (PRN):      Labs:                          8.5    15.46 )-----------( 366      ( 08 Sep 2024 07:19 )             26.0       09-07    141  |  111<H>  |  6<L>  ----------------------------<  98  3.6   |  20<L>  |  1.07    Ca    9.4      07 Sep 2024 10:00  Phos  2.8     09-07  Mg     2.10     09-07       Patient is a 59y Male w/ pmhx of HTN, HLD, unspecified cognitive disorder. POD10 s/p I&D sublingual abscess. patient downgraded to Floor under OMFS care, Patient tolerating PO    Intervals:  Cardiac stable over night, H&H stable, Patient has a BRT called due to patient constantly getting out of bed, 5mg of Haldol was given patient stable after no other events      Physical exam:   Gen: NAD  Resp: BL equal chest rise  Card: regular rate   IOE: FOM mild edema but soft, no s/s of infection, drain site granulating and closing    Vitals:     Vital Signs Last 24 Hrs  T(C): 37.3 (08 Sep 2024 06:45), Max: 37.3 (08 Sep 2024 06:45)  T(F): 99.1 (08 Sep 2024 06:45), Max: 99.1 (08 Sep 2024 06:45)  HR: 82 (08 Sep 2024 06:45) (68 - 85)  BP: 142/78 (08 Sep 2024 06:45) (112/69 - 161/83)  BP(mean): 108 (07 Sep 2024 20:00) (83 - 108)  RR: 17 (08 Sep 2024 06:45) (15 - 20)  SpO2: 100% (08 Sep 2024 06:45) (95% - 100%)    Parameters below as of 08 Sep 2024 06:45  Patient On (Oxygen Delivery Method): room air        I&O's Detail    07 Sep 2024 07:01  -  08 Sep 2024 07:00  --------------------------------------------------------  IN:    dextrose 5% + lactated ringers: 30 mL    IV PiggyBack: 200 mL    IV PiggyBack: 100 mL    Oral Fluid: 360 mL  Total IN: 690 mL    OUT:    Incontinent per Condom Catheter (mL): 2500 mL  Total OUT: 2500 mL    Total NET: -1810 mL          Medications:    MEDICATIONS  (STANDING):  amLODIPine   Tablet 5 milliGRAM(s) Oral daily  atorvastatin 10 milliGRAM(s) Oral with breakfast  chlorhexidine 0.12% Liquid 15 milliLiter(s) Swish and Spit two times a day  chlorhexidine 2% Cloths 1 Application(s) Topical daily  citalopram 30 milliGRAM(s) Oral with breakfast  fluconAZOLE IVPB 200 milliGRAM(s) IV Intermittent every 24 hours  heparin   Injectable 5000 Unit(s) SubCutaneous every 8 hours  lamoTRIgine 100 milliGRAM(s) Oral at bedtime  lithium 600 milliGRAM(s) Oral at bedtime  methylphenidate ER (CONCERTA) 72 milliGRAM(s) Oral every morning  metoprolol tartrate 25 milliGRAM(s) Oral two times a day  pantoprazole  Injectable 40 milliGRAM(s) IV Push two times a day  piperacillin/tazobactam IVPB.. 3.375 Gram(s) IV Intermittent every 8 hours  QUEtiapine 100 milliGRAM(s) Oral at bedtime  sucralfate suspension 1 Gram(s) Oral four times a day    MEDICATIONS  (PRN):      Labs:                          8.5    15.46 )-----------( 366      ( 08 Sep 2024 07:19 )             26.0       09-07    141  |  111<H>  |  6<L>  ----------------------------<  98  3.6   |  20<L>  |  1.07    Ca    9.4      07 Sep 2024 10:00  Phos  2.8     09-07  Mg     2.10     09-07

## 2024-09-09 ENCOUNTER — TRANSCRIPTION ENCOUNTER (OUTPATIENT)
Age: 59
End: 2024-09-09

## 2024-09-09 LAB
ANION GAP SERPL CALC-SCNC: 11 MMOL/L — SIGNIFICANT CHANGE UP (ref 7–14)
BUN SERPL-MCNC: 8 MG/DL — SIGNIFICANT CHANGE UP (ref 7–23)
CALCIUM SERPL-MCNC: 9.4 MG/DL — SIGNIFICANT CHANGE UP (ref 8.4–10.5)
CHLORIDE SERPL-SCNC: 106 MMOL/L — SIGNIFICANT CHANGE UP (ref 98–107)
CO2 SERPL-SCNC: 21 MMOL/L — LOW (ref 22–31)
CREAT SERPL-MCNC: 1.26 MG/DL — SIGNIFICANT CHANGE UP (ref 0.5–1.3)
EGFR: 65 ML/MIN/1.73M2 — SIGNIFICANT CHANGE UP
GLUCOSE SERPL-MCNC: 96 MG/DL — SIGNIFICANT CHANGE UP (ref 70–99)
HCT VFR BLD CALC: 25.7 % — LOW (ref 39–50)
HGB BLD-MCNC: 8.5 G/DL — LOW (ref 13–17)
LITHIUM SERPL-MCNC: 0.9 MMOL/L — SIGNIFICANT CHANGE UP (ref 0.6–1.2)
MAGNESIUM SERPL-MCNC: 2.4 MG/DL — SIGNIFICANT CHANGE UP (ref 1.6–2.6)
MCHC RBC-ENTMCNC: 33.1 GM/DL — SIGNIFICANT CHANGE UP (ref 32–36)
MCHC RBC-ENTMCNC: 33.5 PG — SIGNIFICANT CHANGE UP (ref 27–34)
MCV RBC AUTO: 101.2 FL — HIGH (ref 80–100)
NRBC # BLD: 0 /100 WBCS — SIGNIFICANT CHANGE UP (ref 0–0)
NRBC # FLD: 0 K/UL — SIGNIFICANT CHANGE UP (ref 0–0)
PHOSPHATE SERPL-MCNC: 3.4 MG/DL — SIGNIFICANT CHANGE UP (ref 2.5–4.5)
PLATELET # BLD AUTO: 362 K/UL — SIGNIFICANT CHANGE UP (ref 150–400)
POTASSIUM SERPL-MCNC: 4.2 MMOL/L — SIGNIFICANT CHANGE UP (ref 3.5–5.3)
POTASSIUM SERPL-SCNC: 4.2 MMOL/L — SIGNIFICANT CHANGE UP (ref 3.5–5.3)
RBC # BLD: 2.54 M/UL — LOW (ref 4.2–5.8)
RBC # FLD: 23.9 % — HIGH (ref 10.3–14.5)
SODIUM SERPL-SCNC: 138 MMOL/L — SIGNIFICANT CHANGE UP (ref 135–145)
WBC # BLD: 11.86 K/UL — HIGH (ref 3.8–10.5)
WBC # FLD AUTO: 11.86 K/UL — HIGH (ref 3.8–10.5)

## 2024-09-09 RX ORDER — ENOXAPARIN SODIUM 100 MG/ML
40 INJECTION SUBCUTANEOUS EVERY 24 HOURS
Refills: 0 | Status: DISCONTINUED | OUTPATIENT
Start: 2024-09-09 | End: 2024-09-10

## 2024-09-09 RX ADMIN — CHLORHEXIDINE GLUCONATE 15 MILLILITER(S): 40 SOLUTION TOPICAL at 17:53

## 2024-09-09 RX ADMIN — FLUCONAZOLE 100 MILLIGRAM(S): 150 TABLET ORAL at 06:54

## 2024-09-09 RX ADMIN — Medication 5000 UNIT(S): at 06:51

## 2024-09-09 RX ADMIN — SUCRALFATE 1 GRAM(S): 1 SUSPENSION ORAL at 17:53

## 2024-09-09 RX ADMIN — METOPROLOL TARTRATE 25 MILLIGRAM(S): 100 TABLET ORAL at 17:54

## 2024-09-09 RX ADMIN — Medication 100 MILLIGRAM(S): at 21:22

## 2024-09-09 RX ADMIN — CHLORHEXIDINE GLUCONATE 15 MILLILITER(S): 40 SOLUTION TOPICAL at 06:51

## 2024-09-09 RX ADMIN — Medication 30 MILLIGRAM(S): at 08:59

## 2024-09-09 RX ADMIN — SUCRALFATE 1 GRAM(S): 1 SUSPENSION ORAL at 12:08

## 2024-09-09 RX ADMIN — Medication 10 MILLIGRAM(S): at 08:59

## 2024-09-09 RX ADMIN — PIPERACILLIN SODIUM AND TAZOBACTAM SODIUM 25 GRAM(S): 3; .375 INJECTION, POWDER, FOR SOLUTION INTRAVENOUS at 21:21

## 2024-09-09 RX ADMIN — Medication 40 MILLIGRAM(S): at 06:51

## 2024-09-09 RX ADMIN — Medication 40 MILLIGRAM(S): at 17:54

## 2024-09-09 RX ADMIN — ENOXAPARIN SODIUM 40 MILLIGRAM(S): 100 INJECTION SUBCUTANEOUS at 12:08

## 2024-09-09 RX ADMIN — SUCRALFATE 1 GRAM(S): 1 SUSPENSION ORAL at 06:51

## 2024-09-09 RX ADMIN — LAMOTRIGINE 100 MILLIGRAM(S): 100 TABLET, EXTENDED RELEASE ORAL at 21:21

## 2024-09-09 RX ADMIN — LITHIUM CARBONATE 600 MILLIGRAM(S): 150 CAPSULE ORAL at 21:22

## 2024-09-09 RX ADMIN — PIPERACILLIN SODIUM AND TAZOBACTAM SODIUM 25 GRAM(S): 3; .375 INJECTION, POWDER, FOR SOLUTION INTRAVENOUS at 06:54

## 2024-09-09 RX ADMIN — PIPERACILLIN SODIUM AND TAZOBACTAM SODIUM 25 GRAM(S): 3; .375 INJECTION, POWDER, FOR SOLUTION INTRAVENOUS at 12:08

## 2024-09-09 NOTE — PROGRESS NOTE ADULT - NUTRITIONAL ASSESSMENT
This patient has been assessed with a concern for Malnutrition and has been determined to have a diagnosis/diagnoses of Severe protein-calorie malnutrition and Underweight (BMI < 19).    This patient is being managed with:   Diet Full Liquid-  Supplement Feeding Modality:  Oral  Ensure Enlive Cans or Servings Per Day:  1       Frequency:  Three Times a day  Entered: Sep  6 2024  1:09PM  
This patient has been assessed with a concern for Malnutrition and has been determined to have a diagnosis/diagnoses of Severe protein-calorie malnutrition and Underweight (BMI < 19).    This patient is being managed with:   Diet Soft and Bite Sized-  Liquid Protein Supplement     Qty per Day:  3  Entered: Sep  7 2024  8:50AM

## 2024-09-09 NOTE — DISCHARGE NOTE PROVIDER - NSDCCPTREATMENT_GEN_ALL_CORE_FT
[Negative] : Heme/Lymph [FreeTextEntry9] : Hx pf peripheral neuropathy [de-identified] : Issues with balance PRINCIPAL PROCEDURE  Procedure: Drainage, abscess, sublingual  Findings and Treatment:

## 2024-09-09 NOTE — DISCHARGE NOTE PROVIDER - NSFOLLOWUPCLINICS_GEN_ALL_ED_FT
Oral & Maxillofacial Surgery  Department of Dental Medicine  [phone] [place] [address]  Phone: [phone]  Fax: [phone]

## 2024-09-09 NOTE — DISCHARGE NOTE PROVIDER - NPI NUMBER (FOR SYSADMIN USE ONLY) :
Per family visiting nurse from [facility] took patient's labs today about 1:30pm, pt received a phone call from Dr. Vela's office that blood sugar was 35 and he needed to go to ER. Pt's accucheck in triage was 93. A& O X 4. Pt reports he drank a root beer this afternoon. Pt is on TPN.    [UNKNOWN]

## 2024-09-09 NOTE — PROGRESS NOTE ADULT - ASSESSMENT
59M with HTN, HLD, unspecified cognitive disorder. POD11 s/p I&D sublingual abscess. Minimal swelling and FOM soft, non tender. Wound cx: Staph epidermidis. Downgraded from SICU on 9/7. Pt had another episode of delerium overnight that was controlled with 2mg IV haldol.    PLAN:  - consult for Pt re-eval  - DVT prophylaxis switched to lovenox 40mg every 24hrs  - C/w Zosyn (9/1-9/11) and diflucan (9/4-9/18)  - Protonix and Sucralfate for GI bleed   - Multimodal pain management   - Peridex mouthwash BID  - continue Home meds     Cindi Wylie  Oral and Maxillofacial Surgery  [facility] Pager #[phone]  [facility] Pager: [phone]  Available on Teams

## 2024-09-09 NOTE — DISCHARGE NOTE PROVIDER - DETAILS OF MALNUTRITION DIAGNOSIS/DIAGNOSES
This patient has been assessed with a concern for Malnutrition and was treated during this hospitalization for the following Nutrition diagnosis/diagnoses:     -  09/06/2024: Severe protein-calorie malnutrition   -  09/06/2024: Underweight (BMI < 19)

## 2024-09-09 NOTE — DISCHARGE NOTE PROVIDER - PROVIDER TOKENS
FREE:[LAST:[timoteo],FIRST:[doc],PHONE:[phone]],FAX:[phone]],ADDRESS:[[phone] [place] [address], [id number]]]

## 2024-09-09 NOTE — DISCHARGE NOTE PROVIDER - NSRESEARCHGRANT_MLMHIDDEN_GEN_A_CORE
Patient notified of pathology results and follow-up plan.    Copy of pathology results and plan sent to PCP   updated per CHYNA Daigle RN 01/02/20 11:44 AM   yes

## 2024-09-09 NOTE — DISCHARGE NOTE PROVIDER - NSDCFUADDAPPT_GEN_ALL_CORE_FT
Thursday 9/19/24 at 3:00 PM Thursday 9/19/24 at 3:00 PM    For Follow Up appointment with Gastroenterology please call - [phone]

## 2024-09-09 NOTE — DISCHARGE NOTE PROVIDER - HOSPITAL COURSE
POD1  POD2  POD3  POD4  POD5  POD6  POD7  POD8  POD9  POD10  POD11  POD12 POD1- NAEON, weaning-off propofol to precedex   POD2-NAEON, weaning-off propofol to precedex   POD3- NAEON, failed leak test 1%  POD4- NAEON, failed leak test >1%  POD5- NAEON, failed leak test 1, 5, 3% on multiple attempts, switchd from unasyn to zosyn   POD6- NAEON, failed leak test 1, 5, 3% on multiple attempts, switchd from unasyn to zosyn, magallon removed and retained so magallon was reinserted  moved and retained so magallon was reinserted  POD7- OD7 s/p I&D sublingual abscess, progressing well in SICU. Improved swelling and FOM soft, non tender. Wound cx: Staph epidermidis.  POD8- Improved swelling and FOM soft, non tender. Wound cx: Staph epidermidis.  POD9- Improved swelling and FOM soft, non tender. Wound cx: Staph epidermidis.  POD10- patient downgraded to Floor under OMFS care, Patient tolerating PO  POD11- Cardiac stable over night, H&H stable, Pt had another episode of delerium overnight that was controlled with 2mg IV haldol   HD 1 - s/p I&D sublingual abscess.    POD1- NAEON, weaning-off propofol to precedex   POD2-NAEON, weaning-off propofol to precedex   POD3- NAEON, failed leak test 1%  POD4- NAEON, failed leak test >1%  POD5- NAEON, failed leak test 1, 5, 3% on multiple attempts, switchd from unasyn to zosyn   POD6- NAEON, failed leak test 1, 5, 3% on multiple attempts, switchd from unasyn to zosyn, magallon removed and retained so magallon was reinserted  moved and retained so magallon was reinserted  POD7- s/p I&D sublingual abscess, progressing well in SICU. Improved swelling and FOM soft, non tender. Wound cx: Staph epidermidis.  POD8- Improved swelling and FOM soft, non tender. Wound cx: Staph epidermidis.  POD9- Improved swelling and FOM soft, non tender. Wound cx: Staph epidermidis.  POD10- patient downgraded to Floor under OMFS care, Patient tolerating PO  POD11- Cardiac stable over night, H&H stable, Pt had another episode of delerium overnight that was controlled with 2mg IV haldol. Patient wished to be discharged, case management was not able to set up home PT or arrange a rolling walker, patient encouraged to stay overnight so these services could be available to him.  POD12 - NAEON. AVSS. Patient ambulating, tolerating oral diet, voiding. Patient states he is feeling well and wishes to be discharged. Patient medically fit for discharge. Needs follow up visit with GI, follow up visit scheduled with OMFS for 9/19/24 at 3 PM.

## 2024-09-09 NOTE — DISCHARGE NOTE PROVIDER - CARE PROVIDER_API CALL
doc mahmood  [address] [place]  [place], [place], [address]  Phone: [phone]  Fax: [phone]  Follow Up Time:

## 2024-09-09 NOTE — PROGRESS NOTE ADULT - SUBJECTIVE AND OBJECTIVE BOX
Patient is a 59y Male w/ pmhx of HTN, HLD, unspecified cognitive disorder. POD11 s/p I&D sublingual abscess. Patient downgraded to Floor under OMFS care, Patient tolerating bite sized soft diet.    Intervals:  Cardiac stable over night, H&H stable, Pt had another episode of delerium overnight that was controlled with 2mg IV haldol.    Physical exam:   Gen: NAD  Resp: BL equal chest rise  Card: regular rate   IOE: FOM minimal edema, soft, no s/s of infection, drain site granulating and healing well, minimal pain reported on palpation  EOE- no swelling or facial asymmetry noted    Vitals:     Vital Signs Last 24 Hrs  T(C): 36.9 (08 Sep 2024 22:05), Max: 37.3 (08 Sep 2024 06:45)  T(F): 98.4 (08 Sep 2024 22:05), Max: 99.1 (08 Sep 2024 06:45)  HR: 63 (08 Sep 2024 22:05) (61 - 82)  BP: 131/83 (08 Sep 2024 22:05) (120/70 - 142/78)  BP(mean): --  RR: 19 (08 Sep 2024 22:05) (17 - 19)  SpO2: 100% (08 Sep 2024 22:05) (99% - 100%)    Parameters below as of 08 Sep 2024 22:05  Patient On (Oxygen Delivery Method): room air        I&O's Detail    07 Sep 2024 07:01  -  08 Sep 2024 07:00  --------------------------------------------------------  IN:    dextrose 5% + lactated ringers: 30 mL    IV PiggyBack: 200 mL    IV PiggyBack: 100 mL    Oral Fluid: 360 mL  Total IN: 690 mL    OUT:    Incontinent per Condom Catheter (mL): 2500 mL  Total OUT: 2500 mL    Total NET: -1810 mL      08 Sep 2024 07:01  -  09 Sep 2024 06:37  --------------------------------------------------------  IN:    IV PiggyBack: 100 mL    Oral Fluid: 820 mL  Total IN: 920 mL    OUT:    Incontinent per Condom Catheter (mL): 1930 mL  Total OUT: 1930 mL    Total NET: -1010 mL          Medications:    MEDICATIONS  (STANDING):  amLODIPine   Tablet 5 milliGRAM(s) Oral daily  atorvastatin 10 milliGRAM(s) Oral with breakfast  chlorhexidine 0.12% Liquid 15 milliLiter(s) Swish and Spit two times a day  chlorhexidine 2% Cloths 1 Application(s) Topical daily  citalopram 30 milliGRAM(s) Oral with breakfast  fluconAZOLE IVPB 200 milliGRAM(s) IV Intermittent every 24 hours  heparin   Injectable 5000 Unit(s) SubCutaneous every 8 hours  lamoTRIgine 100 milliGRAM(s) Oral at bedtime  lithium 600 milliGRAM(s) Oral at bedtime  methylphenidate ER (CONCERTA) 72 milliGRAM(s) Oral every morning  metoprolol tartrate 25 milliGRAM(s) Oral two times a day  pantoprazole  Injectable 40 milliGRAM(s) IV Push two times a day  piperacillin/tazobactam IVPB.. 3.375 Gram(s) IV Intermittent every 8 hours  QUEtiapine 100 milliGRAM(s) Oral at bedtime  sucralfate suspension 1 Gram(s) Oral four times a day    MEDICATIONS  (PRN):      Labs:                          8.6    16.46 )-----------( 403      ( 08 Sep 2024 17:50 )             25.8       09-08    142  |  108<H>  |  8   ----------------------------<  249<H>  4.4   |  20<L>  |  1.21    Ca    9.8      08 Sep 2024 07:19  Phos  3.2     09-08  Mg     2.20     09-08

## 2024-09-10 ENCOUNTER — TRANSCRIPTION ENCOUNTER (OUTPATIENT)
Age: 59
End: 2024-09-10

## 2024-09-10 VITALS
DIASTOLIC BLOOD PRESSURE: 61 MMHG | TEMPERATURE: 98 F | OXYGEN SATURATION: 100 % | HEART RATE: 58 BPM | SYSTOLIC BLOOD PRESSURE: 114 MMHG | RESPIRATION RATE: 19 BRPM

## 2024-09-10 LAB
ANION GAP SERPL CALC-SCNC: 10 MMOL/L — SIGNIFICANT CHANGE UP (ref 7–14)
BUN SERPL-MCNC: 7 MG/DL — SIGNIFICANT CHANGE UP (ref 7–23)
CALCIUM SERPL-MCNC: 10 MG/DL — SIGNIFICANT CHANGE UP (ref 8.4–10.5)
CHLORIDE SERPL-SCNC: 106 MMOL/L — SIGNIFICANT CHANGE UP (ref 98–107)
CO2 SERPL-SCNC: 22 MMOL/L — SIGNIFICANT CHANGE UP (ref 22–31)
CREAT SERPL-MCNC: 1.44 MG/DL — HIGH (ref 0.5–1.3)
EGFR: 55 ML/MIN/1.73M2 — LOW
GLUCOSE SERPL-MCNC: 96 MG/DL — SIGNIFICANT CHANGE UP (ref 70–99)
HCT VFR BLD CALC: 27.3 % — LOW (ref 39–50)
HGB BLD-MCNC: 8.8 G/DL — LOW (ref 13–17)
MAGNESIUM SERPL-MCNC: 2.5 MG/DL — SIGNIFICANT CHANGE UP (ref 1.6–2.6)
MCHC RBC-ENTMCNC: 32.2 GM/DL — SIGNIFICANT CHANGE UP (ref 32–36)
MCHC RBC-ENTMCNC: 33.1 PG — SIGNIFICANT CHANGE UP (ref 27–34)
MCV RBC AUTO: 102.6 FL — HIGH (ref 80–100)
NRBC # BLD: 0 /100 WBCS — SIGNIFICANT CHANGE UP (ref 0–0)
NRBC # FLD: 0 K/UL — SIGNIFICANT CHANGE UP (ref 0–0)
PHOSPHATE SERPL-MCNC: 3.1 MG/DL — SIGNIFICANT CHANGE UP (ref 2.5–4.5)
PLATELET # BLD AUTO: 445 K/UL — HIGH (ref 150–400)
POTASSIUM SERPL-MCNC: 4.5 MMOL/L — SIGNIFICANT CHANGE UP (ref 3.5–5.3)
POTASSIUM SERPL-SCNC: 4.5 MMOL/L — SIGNIFICANT CHANGE UP (ref 3.5–5.3)
RBC # BLD: 2.66 M/UL — LOW (ref 4.2–5.8)
RBC # FLD: 23.7 % — HIGH (ref 10.3–14.5)
SODIUM SERPL-SCNC: 138 MMOL/L — SIGNIFICANT CHANGE UP (ref 135–145)
WBC # BLD: 13.51 K/UL — HIGH (ref 3.8–10.5)
WBC # FLD AUTO: 13.51 K/UL — HIGH (ref 3.8–10.5)

## 2024-09-10 RX ORDER — METOPROLOL TARTRATE 100 MG/1
1 TABLET ORAL
Qty: 0 | Refills: 0 | DISCHARGE
Start: 2024-09-10

## 2024-09-10 RX ORDER — LAMOTRIGINE 100 MG/1
1 TABLET, EXTENDED RELEASE ORAL
Qty: 0 | Refills: 0 | DISCHARGE
Start: 2024-09-10

## 2024-09-10 RX ORDER — SUCRALFATE 1 G/10ML
1 SUSPENSION ORAL
Qty: 16 | Refills: 0
Start: 2024-09-10 | End: 2024-09-13

## 2024-09-10 RX ORDER — METHYLPHENIDATE HYDROCHLORIDE 72 MG/1
1 TABLET, EXTENDED RELEASE ORAL
Qty: 0 | Refills: 0 | DISCHARGE
Start: 2024-09-10

## 2024-09-10 RX ORDER — LITHIUM CARBONATE 150 MG/1
1 CAPSULE ORAL
Qty: 0 | Refills: 0 | DISCHARGE
Start: 2024-09-10

## 2024-09-10 RX ORDER — CHLORHEXIDINE GLUCONATE 40 MG/ML
15 SOLUTION TOPICAL
Qty: 1 | Refills: 0
Start: 2024-09-10 | End: 2024-09-16

## 2024-09-10 RX ORDER — SUCRALFATE 1 G/10ML
10 SUSPENSION ORAL
Qty: 160 | Refills: 0
Start: 2024-09-10 | End: 2024-09-13

## 2024-09-10 RX ORDER — AMLODIPINE BESYLATE 10 MG/1
1 TABLET ORAL
Qty: 0 | Refills: 0 | DISCHARGE
Start: 2024-09-10

## 2024-09-10 RX ORDER — CITALOPRAM HYDROBROMIDE 40 MG
3 TABLET ORAL
Qty: 0 | Refills: 0 | DISCHARGE
Start: 2024-09-10

## 2024-09-10 RX ORDER — PANTOPRAZOLE SODIUM 40 MG
1 TABLET, DELAYED RELEASE (ENTERIC COATED) ORAL
Qty: 112 | Refills: 0
Start: 2024-09-10 | End: 2024-11-04

## 2024-09-10 RX ORDER — LEVOFLOXACIN 5 MG/ML
1 INJECTION, SOLUTION INTRAVENOUS
Qty: 1 | Refills: 0
Start: 2024-09-10 | End: 2024-09-10

## 2024-09-10 RX ORDER — FLUCONAZOLE 150 MG/1
2 TABLET ORAL
Qty: 14 | Refills: 0
Start: 2024-09-10 | End: 2024-09-16

## 2024-09-10 RX ORDER — FLUCONAZOLE 150 MG/1
1 TABLET ORAL
Qty: 7 | Refills: 0
Start: 2024-09-10 | End: 2024-09-16

## 2024-09-10 RX ADMIN — FLUCONAZOLE 100 MILLIGRAM(S): 150 TABLET ORAL at 06:33

## 2024-09-10 RX ADMIN — Medication 30 MILLIGRAM(S): at 10:14

## 2024-09-10 RX ADMIN — Medication 40 MILLIGRAM(S): at 06:33

## 2024-09-10 RX ADMIN — PIPERACILLIN SODIUM AND TAZOBACTAM SODIUM 25 GRAM(S): 3; .375 INJECTION, POWDER, FOR SOLUTION INTRAVENOUS at 06:33

## 2024-09-10 RX ADMIN — Medication 10 MILLIGRAM(S): at 10:14

## 2024-09-10 RX ADMIN — AMLODIPINE BESYLATE 5 MILLIGRAM(S): 10 TABLET ORAL at 06:34

## 2024-09-10 RX ADMIN — SUCRALFATE 1 GRAM(S): 1 SUSPENSION ORAL at 13:05

## 2024-09-10 RX ADMIN — CHLORHEXIDINE GLUCONATE 15 MILLILITER(S): 40 SOLUTION TOPICAL at 06:34

## 2024-09-10 RX ADMIN — SUCRALFATE 1 GRAM(S): 1 SUSPENSION ORAL at 06:34

## 2024-09-10 RX ADMIN — METOPROLOL TARTRATE 25 MILLIGRAM(S): 100 TABLET ORAL at 06:34

## 2024-09-10 NOTE — DISCHARGE NOTE NURSING/CASE MANAGEMENT/SOCIAL WORK - NSDCPEFALRISK_GEN_ALL_CORE
For information on Fall & Injury Prevention, visit: [email or web address] OR  [email or web address] OR  https://www.cdc.gov/steadi/patient.html

## 2024-09-10 NOTE — PROGRESS NOTE ADULT - REASON FOR ADMISSION
Sublingual abscess

## 2024-09-10 NOTE — CHART NOTE - NSCHARTNOTEFT_GEN_A_CORE
NUTRITION FOLLOW UP NOTE    Pt seen for severe malnutrition follow-up.     SOURCE: [X] Patient [X] Medical record [X] RN/PCA  [X] Other: OMFS team    Diet Prescription: Soft and bite-sized diet, LPS protein modular (15 gms protein, 100 kcals) x 1/day     Medical Course: Per chart review, 59y Male w/ pmhx of HTN, HLD, unspecified cognitive disorder. POD11 s/p I&D sublingual abscess. Patient downgraded to Floor under OMFS care, Patient tolerating bite sized soft diet per OMFS team.    Nutrition Course: Met with patient at bedside, reports good appetite and po intake. PO intake varies between % per RN flowsheets. Requested orange juice at lunch-RD informed diet office of patient request. Patient denies any nausea/vomiting/diarrhea/constipation or difficulty chewing and swallowing to current consistency. No questions or concern voiced at this time. Continue good po intake of meals encouraged. Plan for d/c home today.       Pertinent Medications: MEDICATIONS  (STANDING):  amLODIPine   Tablet 5 milliGRAM(s) Oral daily  atorvastatin 10 milliGRAM(s) Oral with breakfast  chlorhexidine 0.12% Liquid 15 milliLiter(s) Swish and Spit two times a day  chlorhexidine 2% Cloths 1 Application(s) Topical daily  citalopram 30 milliGRAM(s) Oral with breakfast  enoxaparin Injectable 40 milliGRAM(s) SubCutaneous every 24 hours  fluconAZOLE IVPB 200 milliGRAM(s) IV Intermittent every 24 hours  lamoTRIgine 100 milliGRAM(s) Oral at bedtime  lithium 600 milliGRAM(s) Oral at bedtime  methylphenidate ER (CONCERTA) 72 milliGRAM(s) Oral every morning  metoprolol tartrate 25 milliGRAM(s) Oral two times a day  pantoprazole  Injectable 40 milliGRAM(s) IV Push two times a day  piperacillin/tazobactam IVPB.. 3.375 Gram(s) IV Intermittent every 8 hours  QUEtiapine 100 milliGRAM(s) Oral at bedtime  sucralfate suspension 1 Gram(s) Oral four times a day        Pertinent Labs: 09-10 Na138 mmol/L Glu 96 mg/dL K+ 4.5 mmol/L Cr  1.44 mg/dL<H> BUN 7 mg/dL 09-10 Phos 3.1 mg/dL     A1C with Estimated Average Glucose Result: 5.0 % (08-25-24 @ 22:05)    Weight: (9/6) 92.3 lbs / 41.9 kg, (8/30) 107.8 lbs / 48.9 kg, (8/29 dosing) 100.9 lbs / 45.8 kg   Weight Assessment: Apparent ~9 lb (9%) weight loss since admission (9 days), clinically significant.   Height: 61 in / 154.9 cm  IBW: 112 lbs / 50.9 kg +/-10%  BMI: 17.4 kg/m^2 (at lowest weight)    Physical Assessment, per flowsheets:  No edema or pressure injuries noted.      Estimated Needs:   [X] No change since previous assessment, based on current (and lowest) body weight 92.3 lbs / 41.9 kg  5161-8773 kcal daily @30-35 kcal/kg, 50.28-62.85 gm protein daily @1.2-1.5 gm/kg     Previous Nutrition Diagnosis:   X] Swallowing difficulty, ongoing  [X] Severe malnutrition, ongoing    New Nutrition Diagnosis: [x] none    Education:  [X] not applicable       Interventions:   1) Continue current diet order, which remains appropriate at this time.   2) Please Encourage po intake, assist with meals and menu selections, provide alternatives PRN.   3) Honor food and beverage preferences within diet restriction of patient in an effort to maximize level of nutrient intake.     Monitor & Evaluate:  1) PO intake, tolerance to diet/supplement, nutrition related lab values, weight trends, BMs/GI distress, hydration status, skin integrity.  2) Please document % PO intake in nursing flowsheet.     Neal Mccall, MS, CDN, RDN  Pager #[phone]

## 2024-09-10 NOTE — PROGRESS NOTE ADULT - SUBJECTIVE AND OBJECTIVE BOX
Patient is a 59y Male w/ pmhx of HTN, HLD, unspecified cognitive disorder. POD12 s/p I&D sublingual abscess. Patient tolerating bite sized soft diet, is ambulating and voiding regularly    Intervals: HOMER HIGH. The patient wished to be discharged yesterday, case management informed him he would not be able to have home PT set up for him and would not to be provided with a walker and he is at risk of falling. Patient agreed to stay overnight so case management could coordinate home PT and arrange a rolling walker for him. Creatinine increase to 1.44 noted in AM labs.    Physical exam:   Gen: NAD  Resp: BL equal chest rise  Card: regular rate   IOE: FOM minimal edema, soft, no s/s of infection, drain site granulating and healing well, minimal pain reported on palpation  EOE- no swelling or facial asymmetry noted    Vitals:     Vital Signs Last 24 Hrs  T(C): 36.8 (10 Sep 2024 06:00), Max: 36.8 (09 Sep 2024 21:50)  T(F): 98.2 (10 Sep 2024 06:00), Max: 98.2 (09 Sep 2024 21:50)  HR: 75 (10 Sep 2024 06:00) (65 - 97)  BP: 157/90 (10 Sep 2024 06:00) (102/85 - 165/95)  BP(mean): --  RR: 18 (10 Sep 2024 06:00) (16 - 19)  SpO2: 100% (10 Sep 2024 06:00) (98% - 100%)    Parameters below as of 10 Sep 2024 06:00  Patient On (Oxygen Delivery Method): room air    I&O's Detail    09 Sep 2024 07:01  -  10 Sep 2024 07:00  --------------------------------------------------------  IN:    Oral Fluid: 970 mL  Total IN: 970 mL    OUT:    Voided (mL): 2350 mL  Total OUT: 2350 mL    Total NET: -1380 mL    Medications:    MEDICATIONS  (STANDING):  amLODIPine   Tablet 5 milliGRAM(s) Oral daily  atorvastatin 10 milliGRAM(s) Oral with breakfast  chlorhexidine 0.12% Liquid 15 milliLiter(s) Swish and Spit two times a day  chlorhexidine 2% Cloths 1 Application(s) Topical daily  citalopram 30 milliGRAM(s) Oral with breakfast  enoxaparin Injectable 40 milliGRAM(s) SubCutaneous every 24 hours  fluconAZOLE IVPB 200 milliGRAM(s) IV Intermittent every 24 hours  lamoTRIgine 100 milliGRAM(s) Oral at bedtime  lithium 600 milliGRAM(s) Oral at bedtime  methylphenidate ER (CONCERTA) 72 milliGRAM(s) Oral every morning  metoprolol tartrate 25 milliGRAM(s) Oral two times a day  pantoprazole  Injectable 40 milliGRAM(s) IV Push two times a day  piperacillin/tazobactam IVPB.. 3.375 Gram(s) IV Intermittent every 8 hours  QUEtiapine 100 milliGRAM(s) Oral at bedtime  sucralfate suspension 1 Gram(s) Oral four times a day    MEDICATIONS  (PRN):    Labs:                        8.8    13.51 )-----------( 445      ( 10 Sep 2024 06:18 )             27.3       09-10    138  |  106  |  7   ----------------------------<  96  4.5   |  22  |  1.44<H>    Ca    10.0      10 Sep 2024 07:26  Phos  3.1     09-10  Mg     2.50     09-10

## 2024-09-10 NOTE — PROGRESS NOTE ADULT - PROVIDER SPECIALTY LIST ADULT
OMFS
SICU
Anesthesia
SICU
Gastroenterology
OMFS
SICU
SICU
OMFS
OMFS
SICU

## 2024-09-10 NOTE — DISCHARGE NOTE NURSING/CASE MANAGEMENT/SOCIAL WORK - NSDCFUADDAPPT_GEN_ALL_CORE_FT
Thursday 9/19/24 at 3:00 PM    For Follow Up appointment with Gastroenterology please call - [phone]

## 2024-09-10 NOTE — PROGRESS NOTE ADULT - ASSESSMENT
59M with HTN, HLD, unspecified cognitive disorder. POD12 s/p I&D sublingual abscess. No swelling and FOM soft, non tender. Wound cx: Staph epidermidis. Downgraded from SICU on 9/7.     PLAN:  - Patient medically fit for discharge. Discharge planning coordinated with case management. Discharge this morning  - Home PT - being set up by case management this morning  - Follow up appointment scheduled with [facility] for 9/19/24 at 3 PM  - Follow up phone number to schedule with outpatient gastroenterology listed in discharge paperwork  - continue Home meds   - Discharge medication recommendations from gastroenterology followed   - Discussed discharge medication dosing with pharmacy due to increased creatinine (1.44)    Robert Nj  Oral and Maxillofacial Surgery  [facility] Pager #[phone]  [facility] Pager: [phone]  Available on Teams

## 2024-09-10 NOTE — DISCHARGE NOTE NURSING/CASE MANAGEMENT/SOCIAL WORK - PATIENT PORTAL LINK FT
You can access the FollowMyHealth Patient Portal offered by [facility] by registering at the following website: [email or web address]. By joining [employer]’s FollowMyHealth portal, you will also be able to view your health information using other applications (apps) compatible with our system.

## 2024-09-10 NOTE — DISCHARGE NOTE NURSING/CASE MANAGEMENT/SOCIAL WORK - NSDCDMETYPESERV_GEN_ALL_CORE_FT
Rolling walker was ordered from [facility], delivered to bedside on 9/10/24

## 2024-09-11 PROBLEM — Z00.00 ENCOUNTER FOR PREVENTIVE HEALTH EXAMINATION: Status: ACTIVE | Noted: 2024-09-11

## 2024-09-12 PROBLEM — I10 ESSENTIAL (PRIMARY) HYPERTENSION: Chronic | Status: ACTIVE | Noted: 2024-08-28

## 2024-09-19 ENCOUNTER — APPOINTMENT (OUTPATIENT)
Age: 59
End: 2024-09-19

## 2024-09-28 LAB
CULTURE RESULTS: SIGNIFICANT CHANGE UP
SPECIMEN SOURCE: SIGNIFICANT CHANGE UP

## 2025-05-09 NOTE — PROVIDER CONTACT NOTE (CHANGE IN STATUS NOTIFICATION) - NAME OF MD/NP/PA/DO NOTIFIED:
Increased Coreg from 12.5 BID to 25 BID, instructed to keep bp diary and bring back to follow up visit   Darshan Almeida MD & SICU team

## (undated) DEVICE — DENTURE CUP PINK

## (undated) DEVICE — GOWN LG

## (undated) DEVICE — BIOPSY FORCEP COLD DISP

## (undated) DEVICE — GLV 7.5 PROTEXIS (BLUE)

## (undated) DEVICE — LUBRICATING JELLY HR ONE SHOT 3G

## (undated) DEVICE — CANISTER DISPOSABLE THIN WALL 3000CC

## (undated) DEVICE — BASIN EMESIS 10IN GRADUATED MAUVE

## (undated) DEVICE — DRSG CURITY GAUZE SPONGE 4 X 4" 12-PLY NON-STERILE

## (undated) DEVICE — PROTECTOR HEEL / ELBOW FLUFFY

## (undated) DEVICE — ELCTR GROUNDING PAD ADULT COVIDIEN

## (undated) DEVICE — CATH IV SAFE BC 22G X 1" (BLUE)

## (undated) DEVICE — SUT VICRYL 4-0 27" RB-1 UNDYED

## (undated) DEVICE — DRSG BANDAID 0.75X3"

## (undated) DEVICE — TUBING MEDI-VAC W MAXIGRIP CONNECTORS 1/4"X6'

## (undated) DEVICE — GLV 8 PROTEXIS (WHITE)

## (undated) DEVICE — LABELS BLANK W PEN

## (undated) DEVICE — SOL IRR POUR H2O 500ML

## (undated) DEVICE — VENODYNE/SCD SLEEVE CALF MEDIUM

## (undated) DEVICE — FORCEP ELECTROSURGICAL HEMOSTATIC 2.75MM X 165MM DISP

## (undated) DEVICE — CLAMP BX HOT RAD JAW 3

## (undated) DEVICE — URETERAL CATH RED RUBBER 16FR (ORANGE)

## (undated) DEVICE — BITE BLOCK ADULT 20 X 27MM (GREEN)

## (undated) DEVICE — GLV 8.5 PROTEXIS (WHITE)

## (undated) DEVICE — CONTAINER FORMALIN 80ML YELLOW

## (undated) DEVICE — DRSG TELFA 3 X 8

## (undated) DEVICE — BIOPSY FORCEP RADIAL JAW 4 STANDARD WITH NEEDLE

## (undated) DEVICE — SALIVA EJECTOR (BLUE)

## (undated) DEVICE — SUT CHROMIC 3-0 27" RB-1

## (undated) DEVICE — SUT CHROMIC 4-0 27" RB-1

## (undated) DEVICE — UNDERPAD LINEN SAVER 17 X 24"

## (undated) DEVICE — DRAPE 3/4 SHEET 52X76"

## (undated) DEVICE — ELCTR ECG CONDUCTIVE ADHESIVE

## (undated) DEVICE — ATTACHMENT DISTAL 4X11.35MM

## (undated) DEVICE — TUBING IV SET GRAVITY 3Y 100" MACRO

## (undated) DEVICE — DRSG 2X2

## (undated) DEVICE — PACK IV START WITH CHG

## (undated) DEVICE — ELCTR BOVIE PENCIL SMOKE EVACUATION

## (undated) DEVICE — PACK DENTAL MINOR